# Patient Record
Sex: FEMALE | Race: WHITE | NOT HISPANIC OR LATINO | Employment: FULL TIME | URBAN - METROPOLITAN AREA
[De-identification: names, ages, dates, MRNs, and addresses within clinical notes are randomized per-mention and may not be internally consistent; named-entity substitution may affect disease eponyms.]

---

## 2017-12-26 ENCOUNTER — TELEPHONE (OUTPATIENT)
Dept: DERMATOLOGY | Facility: CLINIC | Age: 37
End: 2017-12-26

## 2017-12-26 NOTE — TELEPHONE ENCOUNTER
----- Message from Moises Basil sent at 12/26/2017  4:14 PM CST -----  Contact: Patient  Patient states that she would like to talk to a nurse and it's about a bump growing on her chin.  It was all explained about when the next available appointment would be and she still would like to talk to the nurse.  Please call her back at [phone].  Thank you

## 2018-05-30 ENCOUNTER — OFFICE VISIT (OUTPATIENT)
Dept: URGENT CARE | Facility: CLINIC | Age: 38
End: 2018-05-30
Payer: COMMERCIAL

## 2018-05-30 VITALS
RESPIRATION RATE: 18 BRPM | DIASTOLIC BLOOD PRESSURE: 68 MMHG | HEART RATE: 74 BPM | TEMPERATURE: 97 F | OXYGEN SATURATION: 99 % | BODY MASS INDEX: 33.34 KG/M2 | WEIGHT: 220 LBS | HEIGHT: 68 IN | SYSTOLIC BLOOD PRESSURE: 113 MMHG

## 2018-05-30 DIAGNOSIS — R51.9 FACIAL DISCOMFORT: Primary | ICD-10-CM

## 2018-05-30 PROCEDURE — 3008F BODY MASS INDEX DOCD: CPT | Mod: CPTII,S$GLB,, | Performed by: FAMILY MEDICINE

## 2018-05-30 PROCEDURE — 3078F DIAST BP <80 MM HG: CPT | Mod: CPTII,S$GLB,, | Performed by: FAMILY MEDICINE

## 2018-05-30 PROCEDURE — 3074F SYST BP LT 130 MM HG: CPT | Mod: CPTII,S$GLB,, | Performed by: FAMILY MEDICINE

## 2018-05-30 PROCEDURE — 99213 OFFICE O/P EST LOW 20 MIN: CPT | Mod: S$GLB,,, | Performed by: FAMILY MEDICINE

## 2018-05-30 NOTE — PATIENT INSTRUCTIONS
Discussed if there are signs of stroke- weakness on one side, drooping of one side, difficulty speaking etc she will need to go to ER for evaluation     What is Bell's palsy? -- Bell's palsy is a condition that causes 1 side of a person's face to become weak or droop. This can happen when 1 of the nerves that control the muscles in the face gets damaged or stops working. Damage to this nerve usually happens when the nerve becomes swollen or inflamed because of an infection with a virus.  Most people with Bell's palsy recover completely. But some people have symptoms of Bell's palsy for the rest of their life.  What are the symptoms of Bell's palsy? -- The symptoms can include:  ?An eyebrow that sags on 1 side  ?Drooping of 1 eye and 1 corner of the mouth  ?1 eye that will not close completely  Some people with Bell's palsy even lose the ability to taste on the front of their tongue. And some become sensitive to loud noises on the affected side.  Is there a test for Bell's palsy? -- No. There is no test. But your doctor or nurse should be able to tell if you have it by learning about your symptoms and doing an exam. Even so, your doctor or nurse might order tests to check whether you have a different medical problem that could be confused with Bell's palsy.  Should I see a doctor or nurse? -- Yes. If you have any symptoms of Bell's palsy, see your doctor or nurse right away. Effective treatments exist, but they work best if you start them soon after your symptoms start.  How is Bell's palsy treated? -- Treatments include:  ?Medicines to reduce inflammation and swelling - Many people with Bell's palsy get medicines called steroids. These are not the same as the steroids athletes take illegally. These steroids help bring down the swelling that is often the cause of Bell's palsy.  ?Protection for your eyes - Bell's palsy sometimes makes it impossible for you to close 1 of your eyes. If that happens, it's important that  "you keep your eye moist and protected. Otherwise your eye can dry out and get damaged. Your doctor might prescribe eyedrops or ointments to protect your eye. You should also wear glasses or goggles during the day and an eye patch at night.  ?Medicines that help fight viruses - The swelling that causes Bell's palsy might be triggered by viruses. For this reason, some people get "antiviral medicines," which help them fight infection.  Will my face go back to normal? -- Probably. Most people with Bell's palsy start to get better within 3 weeks of when their symptoms start. But it can take them up to 6 months to get completely back to normal. Some people do not recover completely. They can have some weakness in their face that never goes away.    "

## 2018-05-30 NOTE — PROGRESS NOTES
"Subjective:       Patient ID: Pura Gudino is a 38 y.o. female.    Vitals:  height is 5' 8" (1.727 m) and weight is 99.8 kg (220 lb). Her oral temperature is 97.4 °F (36.3 °C). Her blood pressure is 113/68 and her pulse is 74. Her respiration is 18 and oxygen saturation is 99%.     Chief Complaint: Facial Swelling    Started today when she woke up noticed right eye felt as if something was in it, felt as if her mouth was lower on right side.   Went to [place] for a wedding, felt bad for 3 days (symtpoms included fatigue/HA). Has a history of migraines. HA "mostly around the eyes". Denies blurry vision.  While in [place] she states she was around someone that had the vaccines  Mother was diagnosed with bells palsy and she is worried she will also have bells palsy     She denies any insect bites or tick bites          Edema   This is a new problem. The current episode started today. The problem occurs constantly. The problem has been gradually worsening. Associated symptoms include headaches. Pertinent negatives include no abdominal pain, chest pain, chills, fever, nausea, rash, sore throat or vomiting. Nothing aggravates the symptoms. She has tried nothing for the symptoms. The treatment provided no relief.     Review of Systems   Constitution: Negative for chills and fever.   HENT: Negative for sore throat.    Eyes: Negative for blurred vision.   Cardiovascular: Negative for chest pain.   Respiratory: Negative for shortness of breath.    Skin: Negative for rash.   Musculoskeletal: Negative for back pain and joint pain.   Gastrointestinal: Negative for abdominal pain, diarrhea, nausea and vomiting.   Neurological: Positive for headaches.   Psychiatric/Behavioral: The patient is not nervous/anxious.        Objective:      Physical Exam   Constitutional: She appears well-developed and well-nourished. No distress.   HENT:   Head: Normocephalic and atraumatic. Head is without right periorbital erythema and without " left periorbital erythema.   Right Ear: External ear normal. A middle ear effusion is present.   Left Ear: External ear normal. A middle ear effusion is present.   Nose: Nose normal. No mucosal edema.   Mouth/Throat: Posterior oropharyngeal erythema present.   Eyes: Conjunctivae, EOM and lids are normal. Right pupil is round and reactive. Left pupil is round and reactive. Pupils are equal.   There is no drooping of the eyes or brows   Neck: Normal range of motion. Neck supple. No thyromegaly present.   Cardiovascular: Normal rate and regular rhythm.    Pulmonary/Chest: Effort normal and breath sounds normal. No respiratory distress.   Musculoskeletal: Normal range of motion.   Neurological: She is alert. She has normal strength. No cranial nerve deficit or sensory deficit. Coordination and gait normal. GCS eye subscore is 4. GCS verbal subscore is 5. GCS motor subscore is 6.   1st exam she denies feeling different sensation right vs left cheek and forehead   2nd exam she states she feels me more on the left than right cheek and forehead   Skin: Skin is warm and dry.   Psychiatric: Her speech is normal and behavior is normal. Thought content normal. Her affect is blunt.   Nursing note and vitals reviewed.      Assessment:       1. Facial discomfort        Plan:         Facial discomfort    I provided reassurance to the patient discussing if there was facial droop or droop of the mouth that it is no longer present. She was able to smile which was even, puff her cheeks, stick her tongue out and move it left to right. Equal  strength. I included on the AVS signs of bell's palsy.   Discussed signs of a stroke, if she had blurry vision, weakness on one side, difficulty speaking, facial droop etc she will need to go to er for eval    Spoke with Dr. Dennis about this case. Patient was unhappy about this and stated she could hear us discussing her case.   Patient was unhappy with being at the clinic for an hour,  states she felt this was too long. I asked her if she wanted to see Dr. Dennis and she declined. Dr. Dennis spoke with the patient briefly, but she took her AVS and walked out of the clinic.

## 2018-05-31 ENCOUNTER — TELEPHONE (OUTPATIENT)
Dept: URGENT CARE | Facility: CLINIC | Age: 38
End: 2018-05-31

## 2018-05-31 NOTE — TELEPHONE ENCOUNTER
Call Back- Patient followed up with primary care after her visit with us yesterday to get a second opinion.

## 2018-06-02 ENCOUNTER — TELEPHONE (OUTPATIENT)
Dept: URGENT CARE | Facility: CLINIC | Age: 38
End: 2018-06-02

## 2018-06-02 NOTE — TELEPHONE ENCOUNTER
Pt states better now cause she went to primary after leaving urgent care and received medication for URI

## 2018-06-13 ENCOUNTER — OFFICE VISIT (OUTPATIENT)
Dept: OPTOMETRY | Facility: CLINIC | Age: 38
End: 2018-06-13
Payer: COMMERCIAL

## 2018-06-13 DIAGNOSIS — H52.203 MYOPIA WITH ASTIGMATISM, BILATERAL: ICD-10-CM

## 2018-06-13 DIAGNOSIS — M06.9 RHEUMATOID ARTHRITIS INVOLVING BOTH HANDS, UNSPECIFIED RHEUMATOID FACTOR PRESENCE: Primary | ICD-10-CM

## 2018-06-13 DIAGNOSIS — G51.8 NEURALGIC FACIAL PAIN: ICD-10-CM

## 2018-06-13 DIAGNOSIS — Z79.899 LONG-TERM USE OF PLAQUENIL: ICD-10-CM

## 2018-06-13 DIAGNOSIS — H52.13 MYOPIA WITH ASTIGMATISM, BILATERAL: ICD-10-CM

## 2018-06-13 PROCEDURE — 92014 COMPRE OPH EXAM EST PT 1/>: CPT | Mod: S$GLB,,, | Performed by: OPTOMETRIST

## 2018-06-13 PROCEDURE — 99999 PR PBB SHADOW E&M-EST. PATIENT-LVL II: CPT | Mod: PBBFAC,,, | Performed by: OPTOMETRIST

## 2018-06-13 PROCEDURE — 92134 CPTRZ OPH DX IMG PST SGM RTA: CPT | Mod: S$GLB,,, | Performed by: OPTOMETRIST

## 2018-06-13 PROCEDURE — 92015 DETERMINE REFRACTIVE STATE: CPT | Mod: S$GLB,,, | Performed by: OPTOMETRIST

## 2018-06-13 NOTE — PROGRESS NOTES
HPI     Some discomfort around OS / inf lid / maxilla area--longstanding  Unsure of sinus allergy issues  Taking plaquenil 5-6 years  Previous OCT 2016        Last edited by ROBIN Tran, OD on 6/13/2018 12:26 PM. (History)        ROS     Positive for: Eyes    Negative for: Constitutional, Gastrointestinal, Neurological, Skin,   Genitourinary, Musculoskeletal, HENT, Endocrine, Cardiovascular,   Respiratory, Psychiatric, Allergic/Imm, Heme/Lymph    Last edited by ROBIN Tran, OD on 6/13/2018  8:16 AM. (History)        Assessment /Plan     For exam results, see Encounter Report.    Rheumatoid arthritis involving both hands, unspecified rheumatoid factor presence  -     Posterior Segment OCT Retina-Both eyes  -     Lopez Visual Field - OU - Extended - Both Eyes; Future    Long-term use of Plaquenil  -     Posterior Segment OCT Retina-Both eyes  -     Lopez Visual Field - OU - Extended - Both Eyes; Future    Neuralgic facial pain    Myopia with astigmatism, bilateral      1,2. Normal macula / retina on DFE today  Updated macular OCT---normal contour, no maculopathy  Order updated 10-2 for new baseline    3. Longstanding, not of ocular origin, follow w/ pcp or neuro  4. Updated specs rx, gave copy, fill prn    Discussed and educated patient on current findings /plan.  RTC 1 year, prn if any changes / issues

## 2019-03-12 ENCOUNTER — TELEPHONE (OUTPATIENT)
Dept: DERMATOLOGY | Facility: CLINIC | Age: 39
End: 2019-03-12

## 2019-03-12 NOTE — TELEPHONE ENCOUNTER
----- Message from Josselyn Durbin LPN sent at 3/8/2019 12:55 PM CST -----  Contact: pt       ----- Message -----  From: Sandra Trent  Sent: 3/8/2019  12:51 PM  To: Elis EDMONDS Staff    JGD - pt Patient Requesting Sooner Appointment.     Reason for sooner appt.: pt is an existing pt of Dr Gillis pt was last seen in 2014 pt is coming in for bumps on her face pt stated Dr Gillis helped her out the last time she was seen for the same issue   When is the first available appointment? No time is coming up at all to schedule   Communication Preference: can you please call pt at [phone]  Additional Information: pt would rather see Dr Gillis I offered another doctor for the pt to see     [facility]

## 2019-05-22 ENCOUNTER — TELEPHONE (OUTPATIENT)
Dept: RHEUMATOLOGY | Facility: CLINIC | Age: 39
End: 2019-05-22

## 2019-05-22 NOTE — TELEPHONE ENCOUNTER
----- Message from Norah Fernández sent at 5/22/2019  1:48 PM CDT -----  Type: Needs Medical Advice    Who Called:  Patient  Best Call Back Number: [phone]  Additional Information: Would like to make a new patient appointment due to her psoriatic arthritis. She had been going to a doctor on the [facility] but would like to start going to your office. Please call to advise.

## 2019-06-04 ENCOUNTER — OFFICE VISIT (OUTPATIENT)
Dept: FAMILY MEDICINE | Facility: CLINIC | Age: 39
End: 2019-06-04
Payer: COMMERCIAL

## 2019-06-04 VITALS
OXYGEN SATURATION: 98 % | HEIGHT: 68 IN | SYSTOLIC BLOOD PRESSURE: 104 MMHG | BODY MASS INDEX: 30.47 KG/M2 | DIASTOLIC BLOOD PRESSURE: 66 MMHG | WEIGHT: 201.06 LBS | HEART RATE: 76 BPM

## 2019-06-04 DIAGNOSIS — M13.0 POLYARTHRITIS: Primary | ICD-10-CM

## 2019-06-04 DIAGNOSIS — H66.90 OTITIS MEDIA, UNSPECIFIED LATERALITY, UNSPECIFIED OTITIS MEDIA TYPE: ICD-10-CM

## 2019-06-04 DIAGNOSIS — F31.9 BIPOLAR AFFECTIVE DISORDER, REMISSION STATUS UNSPECIFIED: ICD-10-CM

## 2019-06-04 PROCEDURE — 99999 PR PBB SHADOW E&M-EST. PATIENT-LVL III: ICD-10-PCS | Mod: PBBFAC,,, | Performed by: INTERNAL MEDICINE

## 2019-06-04 PROCEDURE — 99999 PR PBB SHADOW E&M-EST. PATIENT-LVL III: CPT | Mod: PBBFAC,,, | Performed by: INTERNAL MEDICINE

## 2019-06-04 PROCEDURE — 99203 PR OFFICE/OUTPT VISIT, NEW, LEVL III, 30-44 MIN: ICD-10-PCS | Mod: S$GLB,,, | Performed by: INTERNAL MEDICINE

## 2019-06-04 PROCEDURE — 3078F DIAST BP <80 MM HG: CPT | Mod: CPTII,S$GLB,, | Performed by: INTERNAL MEDICINE

## 2019-06-04 PROCEDURE — 3008F BODY MASS INDEX DOCD: CPT | Mod: CPTII,S$GLB,, | Performed by: INTERNAL MEDICINE

## 2019-06-04 PROCEDURE — 99203 OFFICE O/P NEW LOW 30 MIN: CPT | Mod: S$GLB,,, | Performed by: INTERNAL MEDICINE

## 2019-06-04 PROCEDURE — 3008F PR BODY MASS INDEX (BMI) DOCUMENTED: ICD-10-PCS | Mod: CPTII,S$GLB,, | Performed by: INTERNAL MEDICINE

## 2019-06-04 PROCEDURE — 3074F PR MOST RECENT SYSTOLIC BLOOD PRESSURE < 130 MM HG: ICD-10-PCS | Mod: CPTII,S$GLB,, | Performed by: INTERNAL MEDICINE

## 2019-06-04 PROCEDURE — 3078F PR MOST RECENT DIASTOLIC BLOOD PRESSURE < 80 MM HG: ICD-10-PCS | Mod: CPTII,S$GLB,, | Performed by: INTERNAL MEDICINE

## 2019-06-04 PROCEDURE — 3074F SYST BP LT 130 MM HG: CPT | Mod: CPTII,S$GLB,, | Performed by: INTERNAL MEDICINE

## 2019-06-04 RX ORDER — LORAZEPAM 0.5 MG/1
0.5 TABLET ORAL
COMMUNITY
Start: 2019-03-18 | End: 2019-08-13

## 2019-06-04 RX ORDER — NAPROXEN 500 MG/1
TABLET ORAL DAILY PRN
COMMUNITY
End: 2019-08-13

## 2019-06-04 RX ORDER — MINERAL OIL
ENEMA (ML) RECTAL
COMMUNITY
Start: 2018-06-04 | End: 2022-02-28 | Stop reason: ALTCHOICE

## 2019-06-04 RX ORDER — TOPIRAMATE 200 MG/1
TABLET ORAL
COMMUNITY
End: 2019-07-01 | Stop reason: SDUPTHER

## 2019-06-04 RX ORDER — DOXYCYCLINE 100 MG/1
100 CAPSULE ORAL 2 TIMES DAILY
Qty: 20 CAPSULE | Refills: 0 | Status: SHIPPED | OUTPATIENT
Start: 2019-06-04 | End: 2019-08-13

## 2019-06-04 NOTE — PROGRESS NOTES
Subjective:       Patient ID: Pura Gudino is a 39 y.o. female.    Chief Complaint: Otalgia (Left) and Headache (Left)    Pt here to get established. She complains of left temporal headache that radiates to her era. She says it feels hot. She has a history of migraines and takes topamax for prevention.  She says that her temporal area is tender to touch.  She says this isnot a normal migraine pattern for her. She says her left ear feels stopped up. She last had a sinus infection four weeks ago and took antibiotics at that time. She takes plaquenil for RA and says that her ESR was good two months ago.  She follows with dr marmolejo.     Otalgia    There is pain in the left ear. This is a recurrent problem. The current episode started more than 1 month ago. The problem occurs every few hours. The problem has been waxing and waning. There has been no fever. The pain is at a severity of 6/10. The pain is moderate. Associated symptoms include headaches. Pertinent negatives include no abdominal pain, coughing, diarrhea, rash, sore throat or vomiting. She has tried NSAIDs and antibiotics for the symptoms. The treatment provided mild relief. There is no history of a chronic ear infection, hearing loss or a tympanostomy tube.     Review of Systems   Constitutional: Negative for fatigue, fever and unexpected weight change.   HENT: Positive for ear pain. Negative for congestion, postnasal drip, sinus pain and sore throat.    Eyes: Negative for visual disturbance.   Respiratory: Negative for cough, chest tightness, shortness of breath and wheezing.    Cardiovascular: Negative for chest pain, palpitations and leg swelling.   Gastrointestinal: Negative for abdominal pain, blood in stool, constipation, diarrhea, nausea and vomiting.   Endocrine: Negative for cold intolerance and heat intolerance.   Genitourinary: Negative for difficulty urinating, dysuria and frequency.   Musculoskeletal: Negative for back pain, joint  swelling and myalgias.   Skin: Negative for rash.   Allergic/Immunologic: Negative for environmental allergies.   Neurological: Positive for headaches. Negative for dizziness, seizures and numbness.   Hematological: Does not bruise/bleed easily.   Psychiatric/Behavioral: Negative for agitation and sleep disturbance.       Objective:      Physical Exam   Constitutional: She is oriented to person, place, and time. She appears well-developed and well-nourished.   HENT:   Head: Normocephalic and atraumatic.   Mouth/Throat: Oropharynx is clear and moist.   Left TM erythema, retracted   Eyes: Pupils are equal, round, and reactive to light. Conjunctivae and EOM are normal.   Neck: Normal range of motion. Neck supple. No thyromegaly present.   Cardiovascular: Normal rate, regular rhythm, normal heart sounds and intact distal pulses. Exam reveals no gallop and no friction rub.   No murmur heard.  Pulmonary/Chest: Effort normal and breath sounds normal. No respiratory distress. She has no wheezes. She has no rales.   Abdominal: Soft. Bowel sounds are normal. She exhibits no distension. There is no tenderness.   Musculoskeletal: Normal range of motion. She exhibits no edema.   Lymphadenopathy:     She has no cervical adenopathy.   Neurological: She is alert and oriented to person, place, and time. No cranial nerve deficit.   Skin: Skin is warm and dry.   Psychiatric: She has a normal mood and affect. Her behavior is normal.       Assessment:       1. Polyarthritis    2. Bipolar affective disorder, remission status unspecified    3. Otitis media, unspecified laterality, unspecified otitis media type        Plan:       Otitis media- finished amoxil 4 weeks ago.  Tx with doxy. Encouraged flonase for allergies. Call if no improvement

## 2019-06-18 ENCOUNTER — PATIENT MESSAGE (OUTPATIENT)
Dept: FAMILY MEDICINE | Facility: CLINIC | Age: 39
End: 2019-06-18

## 2019-06-19 ENCOUNTER — OFFICE VISIT (OUTPATIENT)
Dept: FAMILY MEDICINE | Facility: CLINIC | Age: 39
End: 2019-06-19
Payer: COMMERCIAL

## 2019-06-19 VITALS
HEART RATE: 77 BPM | SYSTOLIC BLOOD PRESSURE: 108 MMHG | BODY MASS INDEX: 30.71 KG/M2 | RESPIRATION RATE: 17 BRPM | HEIGHT: 68 IN | WEIGHT: 202.63 LBS | OXYGEN SATURATION: 97 % | DIASTOLIC BLOOD PRESSURE: 80 MMHG

## 2019-06-19 DIAGNOSIS — R35.0 FREQUENCY OF URINATION: Primary | ICD-10-CM

## 2019-06-19 DIAGNOSIS — H92.09 OTALGIA, UNSPECIFIED LATERALITY: ICD-10-CM

## 2019-06-19 PROBLEM — R51 HEADACHE(784.0): Status: ACTIVE | Noted: 2019-06-19

## 2019-06-19 LAB
BILIRUB UR QL STRIP: NEGATIVE
CLARITY UR: CLEAR
COLOR UR: YELLOW
GLUCOSE UR QL STRIP: NEGATIVE
HGB UR QL STRIP: NEGATIVE
KETONES UR QL STRIP: NEGATIVE
LEUKOCYTE ESTERASE UR QL STRIP: NEGATIVE
NITRITE UR QL STRIP: NEGATIVE
PH UR STRIP: 5 [PH] (ref 5–8)
PROT UR QL STRIP: NEGATIVE
SP GR UR STRIP: >=1.03 (ref 1–1.03)
URN SPEC COLLECT METH UR: ABNORMAL

## 2019-06-19 PROCEDURE — 99213 OFFICE O/P EST LOW 20 MIN: CPT | Mod: S$GLB,,, | Performed by: INTERNAL MEDICINE

## 2019-06-19 PROCEDURE — 3074F PR MOST RECENT SYSTOLIC BLOOD PRESSURE < 130 MM HG: ICD-10-PCS | Mod: CPTII,S$GLB,, | Performed by: INTERNAL MEDICINE

## 2019-06-19 PROCEDURE — 3008F BODY MASS INDEX DOCD: CPT | Mod: CPTII,S$GLB,, | Performed by: INTERNAL MEDICINE

## 2019-06-19 PROCEDURE — 3074F SYST BP LT 130 MM HG: CPT | Mod: CPTII,S$GLB,, | Performed by: INTERNAL MEDICINE

## 2019-06-19 PROCEDURE — 3079F DIAST BP 80-89 MM HG: CPT | Mod: CPTII,S$GLB,, | Performed by: INTERNAL MEDICINE

## 2019-06-19 PROCEDURE — 99999 PR PBB SHADOW E&M-EST. PATIENT-LVL IV: CPT | Mod: PBBFAC,,, | Performed by: INTERNAL MEDICINE

## 2019-06-19 PROCEDURE — 99999 PR PBB SHADOW E&M-EST. PATIENT-LVL IV: ICD-10-PCS | Mod: PBBFAC,,, | Performed by: INTERNAL MEDICINE

## 2019-06-19 PROCEDURE — 3079F PR MOST RECENT DIASTOLIC BLOOD PRESSURE 80-89 MM HG: ICD-10-PCS | Mod: CPTII,S$GLB,, | Performed by: INTERNAL MEDICINE

## 2019-06-19 PROCEDURE — 99213 PR OFFICE/OUTPT VISIT, EST, LEVL III, 20-29 MIN: ICD-10-PCS | Mod: S$GLB,,, | Performed by: INTERNAL MEDICINE

## 2019-06-19 PROCEDURE — 81003 URINALYSIS AUTO W/O SCOPE: CPT | Mod: PO

## 2019-06-19 PROCEDURE — 3008F PR BODY MASS INDEX (BMI) DOCUMENTED: ICD-10-PCS | Mod: CPTII,S$GLB,, | Performed by: INTERNAL MEDICINE

## 2019-06-19 RX ORDER — METHYLPREDNISOLONE 4 MG/1
TABLET ORAL
Qty: 1 PACKAGE | Refills: 0 | Status: SHIPPED | OUTPATIENT
Start: 2019-06-19 | End: 2019-07-10

## 2019-06-19 NOTE — PROGRESS NOTES
Subjective:       Patient ID: Pura Gudino is a 39 y.o. female.    Chief Complaint: Otalgia (follow up)    Pt here for follow up ear pain. She says she it improved a little with the antibiotic with once she stopped the antibiotic is returned.  She says she has bilateral ear pain and still has left temporal pain. She also complains of urinary frequency. No burning on urination.  She has appt with gyn regarding testing for bladder spasms.     Otalgia    There is pain in both ears. This is a recurrent problem. The current episode started more than 1 month ago. The problem occurs hourly. The problem has been waxing and waning. There has been no fever. The pain is at a severity of 5/10. The pain is moderate. Associated symptoms include drainage, headaches, neck pain, rhinorrhea and a sore throat. Pertinent negatives include no abdominal pain, coughing, diarrhea, ear discharge, hearing loss, rash or vomiting. She has tried NSAIDs, antibiotics and heat packs for the symptoms. The treatment provided moderate relief. There is no history of a chronic ear infection, hearing loss or a tympanostomy tube.     Review of Systems   HENT: Positive for ear pain, rhinorrhea and sore throat. Negative for ear discharge and hearing loss.    Respiratory: Negative for cough.    Gastrointestinal: Negative for abdominal pain, diarrhea and vomiting.   Musculoskeletal: Positive for neck pain.   Skin: Negative for rash.   Neurological: Positive for headaches.       Objective:      Physical Exam   Constitutional: She is oriented to person, place, and time. She appears well-developed and well-nourished.   HENT:   Head: Normocephalic and atraumatic.   Mouth/Throat: Oropharynx is clear and moist.   Eyes: Pupils are equal, round, and reactive to light. Conjunctivae and EOM are normal.   Neck: Normal range of motion. Neck supple. No thyromegaly present.   Cardiovascular: Normal rate, regular rhythm, normal heart sounds and intact distal  pulses. Exam reveals no gallop and no friction rub.   No murmur heard.  Pulmonary/Chest: Effort normal and breath sounds normal. No respiratory distress. She has no wheezes. She has no rales.   Abdominal: Soft. Bowel sounds are normal. She exhibits no distension. There is no tenderness.   Musculoskeletal: Normal range of motion. She exhibits no edema.   Lymphadenopathy:     She has no cervical adenopathy.   Neurological: She is alert and oriented to person, place, and time. No cranial nerve deficit.   Skin: Skin is warm and dry.   Psychiatric: She has a normal mood and affect. Her behavior is normal.       Assessment:       1. Frequency of urination    2. Otalgia, unspecified laterality        Plan:       Otalgia- ears clear today. Will tx with medrol pack as want ot avoid continue antibitoics. If no improvement will refer to ent.  Alsoconsidering ESR to check for temporal arteritis (will see first if steroid helps)  ferquency of uriation- ua ordered.

## 2019-06-23 ENCOUNTER — PATIENT MESSAGE (OUTPATIENT)
Dept: FAMILY MEDICINE | Facility: CLINIC | Age: 39
End: 2019-06-23

## 2019-06-29 ENCOUNTER — PATIENT MESSAGE (OUTPATIENT)
Dept: FAMILY MEDICINE | Facility: CLINIC | Age: 39
End: 2019-06-29

## 2019-07-02 RX ORDER — TOPIRAMATE 100 MG/1
TABLET, FILM COATED ORAL
Qty: 60 TABLET | Refills: 5 | Status: SHIPPED | OUTPATIENT
Start: 2019-07-02 | End: 2020-01-30

## 2019-08-13 ENCOUNTER — OFFICE VISIT (OUTPATIENT)
Dept: FAMILY MEDICINE | Facility: CLINIC | Age: 39
End: 2019-08-13
Payer: COMMERCIAL

## 2019-08-13 VITALS
BODY MASS INDEX: 31.28 KG/M2 | WEIGHT: 206.38 LBS | HEIGHT: 68 IN | SYSTOLIC BLOOD PRESSURE: 102 MMHG | DIASTOLIC BLOOD PRESSURE: 72 MMHG | HEART RATE: 75 BPM

## 2019-08-13 DIAGNOSIS — J32.9 SINUSITIS, UNSPECIFIED CHRONICITY, UNSPECIFIED LOCATION: Primary | ICD-10-CM

## 2019-08-13 PROCEDURE — 99999 PR PBB SHADOW E&M-EST. PATIENT-LVL III: CPT | Mod: PBBFAC,,, | Performed by: INTERNAL MEDICINE

## 2019-08-13 PROCEDURE — 99213 PR OFFICE/OUTPT VISIT, EST, LEVL III, 20-29 MIN: ICD-10-PCS | Mod: 25,S$GLB,, | Performed by: INTERNAL MEDICINE

## 2019-08-13 PROCEDURE — 99999 PR PBB SHADOW E&M-EST. PATIENT-LVL III: ICD-10-PCS | Mod: PBBFAC,,, | Performed by: INTERNAL MEDICINE

## 2019-08-13 PROCEDURE — 3074F SYST BP LT 130 MM HG: CPT | Mod: CPTII,S$GLB,, | Performed by: INTERNAL MEDICINE

## 2019-08-13 PROCEDURE — 96372 PR INJECTION,THERAP/PROPH/DIAG2ST, IM OR SUBCUT: ICD-10-PCS | Mod: S$GLB,,, | Performed by: INTERNAL MEDICINE

## 2019-08-13 PROCEDURE — 3078F DIAST BP <80 MM HG: CPT | Mod: CPTII,S$GLB,, | Performed by: INTERNAL MEDICINE

## 2019-08-13 PROCEDURE — 3008F PR BODY MASS INDEX (BMI) DOCUMENTED: ICD-10-PCS | Mod: CPTII,S$GLB,, | Performed by: INTERNAL MEDICINE

## 2019-08-13 PROCEDURE — 3008F BODY MASS INDEX DOCD: CPT | Mod: CPTII,S$GLB,, | Performed by: INTERNAL MEDICINE

## 2019-08-13 PROCEDURE — 96372 THER/PROPH/DIAG INJ SC/IM: CPT | Mod: S$GLB,,, | Performed by: INTERNAL MEDICINE

## 2019-08-13 PROCEDURE — 99213 OFFICE O/P EST LOW 20 MIN: CPT | Mod: 25,S$GLB,, | Performed by: INTERNAL MEDICINE

## 2019-08-13 PROCEDURE — 3078F PR MOST RECENT DIASTOLIC BLOOD PRESSURE < 80 MM HG: ICD-10-PCS | Mod: CPTII,S$GLB,, | Performed by: INTERNAL MEDICINE

## 2019-08-13 PROCEDURE — 3074F PR MOST RECENT SYSTOLIC BLOOD PRESSURE < 130 MM HG: ICD-10-PCS | Mod: CPTII,S$GLB,, | Performed by: INTERNAL MEDICINE

## 2019-08-13 RX ORDER — LORAZEPAM 0.5 MG/1
0.5 TABLET ORAL
COMMUNITY
Start: 2019-07-17 | End: 2019-08-13

## 2019-08-13 RX ORDER — VILAZODONE HYDROCHLORIDE 20 MG/1
20 TABLET ORAL
COMMUNITY
Start: 2019-08-07 | End: 2020-02-26 | Stop reason: SDUPTHER

## 2019-08-13 RX ORDER — AZITHROMYCIN 250 MG/1
TABLET, FILM COATED ORAL
Qty: 6 TABLET | Refills: 0 | Status: SHIPPED | OUTPATIENT
Start: 2019-08-13 | End: 2019-08-18

## 2019-08-13 RX ORDER — PROPRANOLOL HYDROCHLORIDE 80 MG/1
CAPSULE, EXTENDED RELEASE ORAL
Refills: 4 | COMMUNITY
Start: 2019-07-23 | End: 2020-02-26 | Stop reason: SDUPTHER

## 2019-08-13 RX ORDER — TRIAMCINOLONE ACETONIDE 40 MG/ML
40 INJECTION, SUSPENSION INTRA-ARTICULAR; INTRAMUSCULAR
Status: COMPLETED | OUTPATIENT
Start: 2019-08-13 | End: 2019-08-13

## 2019-08-13 RX ORDER — VILAZODONE HYDROCHLORIDE 20 MG/1
TABLET ORAL
Refills: 1 | COMMUNITY
Start: 2019-08-07 | End: 2021-01-28

## 2019-08-13 RX ADMIN — TRIAMCINOLONE ACETONIDE 40 MG: 40 INJECTION, SUSPENSION INTRA-ARTICULAR; INTRAMUSCULAR at 11:08

## 2019-08-13 NOTE — PROGRESS NOTES
"Subjective:       Patient ID: Pura Gudino is a 39 y.o. female.    Chief Complaint: headaches (headaches for a week. )    Pt here for sinus pressure, headache. She a fever last week. She has PND, runny nose, sneezing.  She denies any colored sputum. She is coughing a dry cough. She just overall feels "under the weather."      Review of Systems   Constitutional: Negative for fatigue, fever and unexpected weight change.   HENT: Negative for congestion, postnasal drip, sinus pain and sore throat.    Eyes: Negative for visual disturbance.   Respiratory: Negative for cough, chest tightness, shortness of breath and wheezing.    Cardiovascular: Negative for chest pain, palpitations and leg swelling.   Gastrointestinal: Negative for abdominal pain, blood in stool, constipation, diarrhea, nausea and vomiting.   Endocrine: Negative for cold intolerance and heat intolerance.   Genitourinary: Negative for difficulty urinating, dysuria and frequency.   Musculoskeletal: Negative for back pain, joint swelling and myalgias.   Skin: Negative for rash.   Allergic/Immunologic: Negative for environmental allergies.   Neurological: Negative for dizziness, seizures, numbness and headaches.   Hematological: Does not bruise/bleed easily.   Psychiatric/Behavioral: Negative for agitation and sleep disturbance.       Objective:      Physical Exam   Constitutional: She is oriented to person, place, and time. She appears well-developed and well-nourished.   HENT:   Head: Normocephalic and atraumatic.   Mouth/Throat: Oropharynx is clear and moist.   Eyes: Pupils are equal, round, and reactive to light. Conjunctivae and EOM are normal.   Neck: Normal range of motion. Neck supple. No thyromegaly present.   Cardiovascular: Normal rate, regular rhythm, normal heart sounds and intact distal pulses. Exam reveals no gallop and no friction rub.   No murmur heard.  Pulmonary/Chest: Effort normal and breath sounds normal. No respiratory distress. " She has no wheezes. She has no rales.   Abdominal: Soft. Bowel sounds are normal. She exhibits no distension. There is no tenderness.   Musculoskeletal: Normal range of motion. She exhibits no edema.   Lymphadenopathy:     She has no cervical adenopathy.   Neurological: She is alert and oriented to person, place, and time. No cranial nerve deficit.   Skin: Skin is warm and dry.   Psychiatric: She has a normal mood and affect. Her behavior is normal.       Assessment:       1. Sinusitis, unspecified chronicity, unspecified location        Plan:       sinusitits-  Kenalog, zpack. Call if no improvmeent

## 2019-08-18 ENCOUNTER — PATIENT MESSAGE (OUTPATIENT)
Dept: FAMILY MEDICINE | Facility: CLINIC | Age: 39
End: 2019-08-18

## 2019-08-19 RX ORDER — FLUCONAZOLE 150 MG/1
150 TABLET ORAL DAILY
Qty: 1 TABLET | Refills: 0 | Status: SHIPPED | OUTPATIENT
Start: 2019-08-19 | End: 2019-08-20

## 2019-08-27 ENCOUNTER — OFFICE VISIT (OUTPATIENT)
Dept: FAMILY MEDICINE | Facility: CLINIC | Age: 39
End: 2019-08-27
Payer: COMMERCIAL

## 2019-08-27 VITALS
HEART RATE: 65 BPM | WEIGHT: 207.81 LBS | SYSTOLIC BLOOD PRESSURE: 128 MMHG | OXYGEN SATURATION: 99 % | TEMPERATURE: 98 F | BODY MASS INDEX: 31.49 KG/M2 | HEIGHT: 68 IN | DIASTOLIC BLOOD PRESSURE: 86 MMHG

## 2019-08-27 DIAGNOSIS — M25.519 SHOULDER PAIN, UNSPECIFIED CHRONICITY, UNSPECIFIED LATERALITY: Primary | ICD-10-CM

## 2019-08-27 PROCEDURE — 99999 PR PBB SHADOW E&M-EST. PATIENT-LVL IV: CPT | Mod: PBBFAC,,, | Performed by: INTERNAL MEDICINE

## 2019-08-27 PROCEDURE — 3008F PR BODY MASS INDEX (BMI) DOCUMENTED: ICD-10-PCS | Mod: CPTII,S$GLB,, | Performed by: INTERNAL MEDICINE

## 2019-08-27 PROCEDURE — 3079F PR MOST RECENT DIASTOLIC BLOOD PRESSURE 80-89 MM HG: ICD-10-PCS | Mod: CPTII,S$GLB,, | Performed by: INTERNAL MEDICINE

## 2019-08-27 PROCEDURE — 99213 OFFICE O/P EST LOW 20 MIN: CPT | Mod: S$GLB,,, | Performed by: INTERNAL MEDICINE

## 2019-08-27 PROCEDURE — 3074F SYST BP LT 130 MM HG: CPT | Mod: CPTII,S$GLB,, | Performed by: INTERNAL MEDICINE

## 2019-08-27 PROCEDURE — 3079F DIAST BP 80-89 MM HG: CPT | Mod: CPTII,S$GLB,, | Performed by: INTERNAL MEDICINE

## 2019-08-27 PROCEDURE — 3008F BODY MASS INDEX DOCD: CPT | Mod: CPTII,S$GLB,, | Performed by: INTERNAL MEDICINE

## 2019-08-27 PROCEDURE — 99213 PR OFFICE/OUTPT VISIT, EST, LEVL III, 20-29 MIN: ICD-10-PCS | Mod: S$GLB,,, | Performed by: INTERNAL MEDICINE

## 2019-08-27 PROCEDURE — 99999 PR PBB SHADOW E&M-EST. PATIENT-LVL IV: ICD-10-PCS | Mod: PBBFAC,,, | Performed by: INTERNAL MEDICINE

## 2019-08-27 PROCEDURE — 3074F PR MOST RECENT SYSTOLIC BLOOD PRESSURE < 130 MM HG: ICD-10-PCS | Mod: CPTII,S$GLB,, | Performed by: INTERNAL MEDICINE

## 2019-08-27 RX ORDER — METHYLPREDNISOLONE 4 MG/1
TABLET ORAL
Qty: 1 PACKAGE | Refills: 0 | Status: SHIPPED | OUTPATIENT
Start: 2019-08-27 | End: 2019-09-17

## 2019-08-27 NOTE — PROGRESS NOTES
Subjective:       Patient ID: Pura Gudino is a 39 y.o. female.    Chief Complaint: Headache and Shoulder Pain (left side)    Pt states she had her eyebrows waxed last week and now has strange sensation/pain around her eye and under eye brow. She says it feels like the temporal pain she has had in past that we treated with medrol pack.   She also has bilateral shoulder pain. This is a chronic problem for her over the last 8 years but says it is getting worse. She does not have an orthopedist.     Review of Systems   Constitutional: Negative for activity change and unexpected weight change.   HENT: Negative for hearing loss, rhinorrhea and trouble swallowing.    Eyes: Negative for discharge and visual disturbance.   Respiratory: Negative for chest tightness and wheezing.    Cardiovascular: Negative for chest pain and palpitations.   Gastrointestinal: Negative for blood in stool, constipation, diarrhea and vomiting.   Endocrine: Negative for polydipsia and polyuria.   Genitourinary: Negative for difficulty urinating, dysuria, hematuria and menstrual problem.   Musculoskeletal: Positive for arthralgias. Negative for joint swelling and neck pain.   Neurological: Positive for headaches. Negative for weakness.   Psychiatric/Behavioral: Positive for dysphoric mood. Negative for confusion.       Objective:      Physical Exam   Constitutional: She is oriented to person, place, and time. She appears well-developed and well-nourished.   HENT:   Head: Normocephalic and atraumatic.   Mouth/Throat: Oropharynx is clear and moist.   Eyes: Pupils are equal, round, and reactive to light. Conjunctivae and EOM are normal.   Neck: Normal range of motion. Neck supple. No thyromegaly present.   Cardiovascular: Normal rate, regular rhythm, normal heart sounds and intact distal pulses. Exam reveals no gallop and no friction rub.   No murmur heard.  Pulmonary/Chest: Effort normal and breath sounds normal. No respiratory distress.  She has no wheezes. She has no rales.   Abdominal: Soft. Bowel sounds are normal. She exhibits no distension. There is no tenderness.   Musculoskeletal: Normal range of motion. She exhibits no edema.   Lymphadenopathy:     She has no cervical adenopathy.   Neurological: She is alert and oriented to person, place, and time. No cranial nerve deficit.   Skin: Skin is warm and dry.   Psychiatric: She has a normal mood and affect. Her behavior is normal.       Assessment:       1. Shoulder pain, unspecified chronicity, unspecified laterality        Plan:       Shoulder pain- referral t [facility]  Sent in medrol pack for sensatino around eye. No rash. Will let me know if rash develops.

## 2019-08-28 ENCOUNTER — PATIENT MESSAGE (OUTPATIENT)
Dept: FAMILY MEDICINE | Facility: CLINIC | Age: 39
End: 2019-08-28

## 2019-08-28 DIAGNOSIS — G44.89 CHRONIC MIXED HEADACHE SYNDROME: ICD-10-CM

## 2019-08-29 DIAGNOSIS — M25.511 CHRONIC RIGHT SHOULDER PAIN: Primary | ICD-10-CM

## 2019-08-29 DIAGNOSIS — G89.29 CHRONIC RIGHT SHOULDER PAIN: Primary | ICD-10-CM

## 2019-09-09 ENCOUNTER — PATIENT MESSAGE (OUTPATIENT)
Dept: FAMILY MEDICINE | Facility: CLINIC | Age: 39
End: 2019-09-09

## 2019-09-14 ENCOUNTER — HOSPITAL ENCOUNTER (OUTPATIENT)
Dept: RADIOLOGY | Facility: HOSPITAL | Age: 39
Discharge: HOME | End: 2019-09-14
Attending: INTERNAL MEDICINE
Payer: COMMERCIAL

## 2019-09-14 DIAGNOSIS — G44.89 CHRONIC MIXED HEADACHE SYNDROME: ICD-10-CM

## 2019-09-14 PROCEDURE — 70551 MRI BRAIN STEM W/O DYE: CPT | Mod: 26,,, | Performed by: RADIOLOGY

## 2019-09-14 PROCEDURE — 70551 MRI BRAIN STEM W/O DYE: CPT | Mod: TC,PO

## 2019-09-14 PROCEDURE — 70551 MRI BRAIN WITHOUT CONTRAST: ICD-10-PCS | Mod: 26,,, | Performed by: RADIOLOGY

## 2019-10-02 PROBLEM — R51.9 HEADACHE: Status: ACTIVE | Noted: 2019-06-19

## 2019-10-14 ENCOUNTER — IMMUNIZATION (OUTPATIENT)
Dept: PHARMACY | Facility: CLINIC | Age: 39
End: 2019-10-14
Payer: COMMERCIAL

## 2019-10-24 ENCOUNTER — PATIENT MESSAGE (OUTPATIENT)
Dept: FAMILY MEDICINE | Facility: CLINIC | Age: 39
End: 2019-10-24

## 2019-10-24 RX ORDER — PANTOPRAZOLE SODIUM 40 MG/1
TABLET, DELAYED RELEASE ORAL
Qty: 30 TABLET | Refills: 5 | Status: SHIPPED | OUTPATIENT
Start: 2019-10-24 | End: 2020-04-21

## 2019-12-04 ENCOUNTER — OFFICE VISIT (OUTPATIENT)
Dept: FAMILY MEDICINE | Facility: CLINIC | Age: 39
End: 2019-12-04
Payer: COMMERCIAL

## 2019-12-04 VITALS
WEIGHT: 208.13 LBS | RESPIRATION RATE: 18 BRPM | HEART RATE: 83 BPM | HEIGHT: 68 IN | DIASTOLIC BLOOD PRESSURE: 70 MMHG | BODY MASS INDEX: 31.54 KG/M2 | SYSTOLIC BLOOD PRESSURE: 100 MMHG | OXYGEN SATURATION: 97 %

## 2019-12-04 DIAGNOSIS — B96.89 BACTERIAL SINUSITIS: Primary | ICD-10-CM

## 2019-12-04 DIAGNOSIS — J32.9 BACTERIAL SINUSITIS: Primary | ICD-10-CM

## 2019-12-04 PROCEDURE — 99213 OFFICE O/P EST LOW 20 MIN: CPT | Mod: S$GLB,,, | Performed by: INTERNAL MEDICINE

## 2019-12-04 PROCEDURE — 3008F PR BODY MASS INDEX (BMI) DOCUMENTED: ICD-10-PCS | Mod: CPTII,S$GLB,, | Performed by: INTERNAL MEDICINE

## 2019-12-04 PROCEDURE — 99999 PR PBB SHADOW E&M-EST. PATIENT-LVL III: CPT | Mod: PBBFAC,,, | Performed by: INTERNAL MEDICINE

## 2019-12-04 PROCEDURE — 3078F PR MOST RECENT DIASTOLIC BLOOD PRESSURE < 80 MM HG: ICD-10-PCS | Mod: CPTII,S$GLB,, | Performed by: INTERNAL MEDICINE

## 2019-12-04 PROCEDURE — 3008F BODY MASS INDEX DOCD: CPT | Mod: CPTII,S$GLB,, | Performed by: INTERNAL MEDICINE

## 2019-12-04 PROCEDURE — 99999 PR PBB SHADOW E&M-EST. PATIENT-LVL III: ICD-10-PCS | Mod: PBBFAC,,, | Performed by: INTERNAL MEDICINE

## 2019-12-04 PROCEDURE — 3074F SYST BP LT 130 MM HG: CPT | Mod: CPTII,S$GLB,, | Performed by: INTERNAL MEDICINE

## 2019-12-04 PROCEDURE — 3074F PR MOST RECENT SYSTOLIC BLOOD PRESSURE < 130 MM HG: ICD-10-PCS | Mod: CPTII,S$GLB,, | Performed by: INTERNAL MEDICINE

## 2019-12-04 PROCEDURE — 3078F DIAST BP <80 MM HG: CPT | Mod: CPTII,S$GLB,, | Performed by: INTERNAL MEDICINE

## 2019-12-04 PROCEDURE — 99213 PR OFFICE/OUTPT VISIT, EST, LEVL III, 20-29 MIN: ICD-10-PCS | Mod: S$GLB,,, | Performed by: INTERNAL MEDICINE

## 2019-12-04 RX ORDER — AZITHROMYCIN 250 MG/1
TABLET, FILM COATED ORAL
Qty: 6 TABLET | Refills: 0 | Status: SHIPPED | OUTPATIENT
Start: 2019-12-04 | End: 2019-12-09

## 2019-12-04 RX ORDER — VENLAFAXINE HYDROCHLORIDE 75 MG/1
150 CAPSULE, EXTENDED RELEASE ORAL
Refills: 0 | COMMUNITY
Start: 2019-09-30 | End: 2022-01-26

## 2019-12-04 NOTE — PROGRESS NOTES
Subjective:       Patient ID: Pura Gudino is a 39 y.o. female.    Chief Complaint: Sore Throat    Pt here for 4 days of sore throat, cough, sinus tenderness. She has been having low grade temps. She denies green or yellow drainage.     Review of Systems   Constitutional: Negative for fatigue, fever and unexpected weight change.   HENT: Negative for congestion, postnasal drip, sinus pain and sore throat.    Eyes: Negative for visual disturbance.   Respiratory: Negative for cough, chest tightness, shortness of breath and wheezing.    Cardiovascular: Negative for chest pain, palpitations and leg swelling.   Gastrointestinal: Negative for abdominal pain, blood in stool, constipation, diarrhea, nausea and vomiting.   Endocrine: Negative for cold intolerance and heat intolerance.   Genitourinary: Negative for difficulty urinating, dysuria and frequency.   Musculoskeletal: Negative for back pain, joint swelling and myalgias.   Skin: Negative for rash.   Allergic/Immunologic: Negative for environmental allergies.   Neurological: Negative for dizziness, seizures, numbness and headaches.   Hematological: Does not bruise/bleed easily.   Psychiatric/Behavioral: Negative for agitation and sleep disturbance.       Objective:      Physical Exam   Constitutional: She is oriented to person, place, and time. She appears well-developed and well-nourished.   HENT:   Head: Normocephalic and atraumatic.   Mouth/Throat: Oropharynx is clear and moist.   Eyes: Pupils are equal, round, and reactive to light. Conjunctivae and EOM are normal.   Neck: Normal range of motion. Neck supple. No thyromegaly present.   Cardiovascular: Normal rate, regular rhythm, normal heart sounds and intact distal pulses. Exam reveals no gallop and no friction rub.   No murmur heard.  Pulmonary/Chest: Effort normal and breath sounds normal. No respiratory distress. She has no wheezes. She has no rales.   Abdominal: Soft. Bowel sounds are normal. She  exhibits no distension. There is no tenderness.   Musculoskeletal: Normal range of motion. She exhibits no edema.   Lymphadenopathy:     She has no cervical adenopathy.   Neurological: She is alert and oriented to person, place, and time. No cranial nerve deficit.   Skin: Skin is warm and dry.   Psychiatric: She has a normal mood and affect. Her behavior is normal.       Assessment:       1. Bacterial sinusitis        Plan:       Sinusitis- sent in zpack

## 2019-12-06 ENCOUNTER — OFFICE VISIT (OUTPATIENT)
Dept: FAMILY MEDICINE | Facility: CLINIC | Age: 39
End: 2019-12-06
Payer: COMMERCIAL

## 2019-12-06 VITALS
WEIGHT: 206.38 LBS | DIASTOLIC BLOOD PRESSURE: 84 MMHG | RESPIRATION RATE: 20 BRPM | OXYGEN SATURATION: 97 % | HEART RATE: 102 BPM | SYSTOLIC BLOOD PRESSURE: 108 MMHG | TEMPERATURE: 99 F | HEIGHT: 68 IN | BODY MASS INDEX: 31.28 KG/M2

## 2019-12-06 DIAGNOSIS — J45.20 MILD INTERMITTENT ASTHMA, UNSPECIFIED WHETHER COMPLICATED: ICD-10-CM

## 2019-12-06 DIAGNOSIS — J45.41 MODERATE PERSISTENT REACTIVE AIRWAY DISEASE WITH ACUTE EXACERBATION: Primary | ICD-10-CM

## 2019-12-06 DIAGNOSIS — R05.9 COUGH: ICD-10-CM

## 2019-12-06 PROCEDURE — 3079F DIAST BP 80-89 MM HG: CPT | Mod: CPTII,S$GLB,, | Performed by: NURSE PRACTITIONER

## 2019-12-06 PROCEDURE — 96372 THER/PROPH/DIAG INJ SC/IM: CPT | Mod: S$GLB,,, | Performed by: NURSE PRACTITIONER

## 2019-12-06 PROCEDURE — 3008F PR BODY MASS INDEX (BMI) DOCUMENTED: ICD-10-PCS | Mod: CPTII,S$GLB,, | Performed by: NURSE PRACTITIONER

## 2019-12-06 PROCEDURE — 99213 OFFICE O/P EST LOW 20 MIN: CPT | Mod: S$GLB,,, | Performed by: NURSE PRACTITIONER

## 2019-12-06 PROCEDURE — 99213 PR OFFICE/OUTPT VISIT, EST, LEVL III, 20-29 MIN: ICD-10-PCS | Mod: S$GLB,,, | Performed by: NURSE PRACTITIONER

## 2019-12-06 PROCEDURE — 3074F SYST BP LT 130 MM HG: CPT | Mod: CPTII,S$GLB,, | Performed by: NURSE PRACTITIONER

## 2019-12-06 PROCEDURE — 3008F BODY MASS INDEX DOCD: CPT | Mod: CPTII,S$GLB,, | Performed by: NURSE PRACTITIONER

## 2019-12-06 PROCEDURE — 99999 PR PBB SHADOW E&M-EST. PATIENT-LVL V: ICD-10-PCS | Mod: PBBFAC,,, | Performed by: NURSE PRACTITIONER

## 2019-12-06 PROCEDURE — 96372 PR INJECTION,THERAP/PROPH/DIAG2ST, IM OR SUBCUT: ICD-10-PCS | Mod: S$GLB,,, | Performed by: NURSE PRACTITIONER

## 2019-12-06 PROCEDURE — 3079F PR MOST RECENT DIASTOLIC BLOOD PRESSURE 80-89 MM HG: ICD-10-PCS | Mod: CPTII,S$GLB,, | Performed by: NURSE PRACTITIONER

## 2019-12-06 PROCEDURE — 99999 PR PBB SHADOW E&M-EST. PATIENT-LVL V: CPT | Mod: PBBFAC,,, | Performed by: NURSE PRACTITIONER

## 2019-12-06 PROCEDURE — 3074F PR MOST RECENT SYSTOLIC BLOOD PRESSURE < 130 MM HG: ICD-10-PCS | Mod: CPTII,S$GLB,, | Performed by: NURSE PRACTITIONER

## 2019-12-06 RX ORDER — FLUTICASONE PROPIONATE 44 UG/1
2 AEROSOL, METERED RESPIRATORY (INHALATION) 2 TIMES DAILY
Qty: 10.6 G | Refills: 0 | Status: SHIPPED | OUTPATIENT
Start: 2019-12-06 | End: 2021-01-28

## 2019-12-06 RX ORDER — BETAMETHASONE SODIUM PHOSPHATE AND BETAMETHASONE ACETATE 3; 3 MG/ML; MG/ML
6 INJECTION, SUSPENSION INTRA-ARTICULAR; INTRALESIONAL; INTRAMUSCULAR; SOFT TISSUE
Status: COMPLETED | OUTPATIENT
Start: 2019-12-06 | End: 2019-12-06

## 2019-12-06 RX ORDER — PREDNISONE 20 MG/1
TABLET ORAL
Qty: 18 TABLET | Refills: 0 | Status: SHIPPED | OUTPATIENT
Start: 2019-12-06 | End: 2020-01-13

## 2019-12-06 RX ADMIN — BETAMETHASONE SODIUM PHOSPHATE AND BETAMETHASONE ACETATE 6 MG: 3; 3 INJECTION, SUSPENSION INTRA-ARTICULAR; INTRALESIONAL; INTRAMUSCULAR; SOFT TISSUE at 02:12

## 2019-12-06 NOTE — PROGRESS NOTES
Subjective:       Patient ID: Pura Gudino is a 39 y.o. female.    Chief Complaint: Sore Throat (SOB ); Cough; and Generalized Body Aches (feels worse than she did on wed. )    Ms. Gudino is a new patient to me.  She here as a follow up to our family medicine clinic.  She was here in our clinic two days on Wednesday, she was treated and feels as if she is not improving. She is taking her prescribed mediation but feels worse than she did on Wednesday.    Vitals:    12/06/19 1426   BP: 108/84   Pulse: 102   Resp: 20   Temp: 98.7 °F (37.1 °C)     Review of Systems   Constitutional: Positive for activity change, appetite change and fatigue. Negative for chills, diaphoresis, fever and unexpected weight change.   HENT: Positive for congestion, ear pain, postnasal drip, rhinorrhea, sinus pressure, sinus pain, sneezing and sore throat. Negative for ear discharge, facial swelling, hearing loss, tinnitus, trouble swallowing and voice change.    Eyes: Negative for pain, discharge, redness and itching.   Respiratory: Positive for cough, chest tightness and wheezing. Negative for apnea, choking, shortness of breath and stridor.    Cardiovascular: Negative for chest pain, palpitations and leg swelling.   Gastrointestinal: Negative for abdominal pain, blood in stool, constipation, diarrhea, nausea and vomiting.   Genitourinary: Negative for difficulty urinating, flank pain, hematuria and urgency.   Musculoskeletal: Negative for back pain, joint swelling, myalgias and neck pain.   Skin: Negative for color change, pallor and rash.   Neurological: Positive for weakness, light-headedness and headaches. Negative for dizziness, seizures, syncope, speech difficulty and numbness.   Psychiatric/Behavioral: Negative for behavioral problems, confusion, decreased concentration and dysphoric mood. The patient is not nervous/anxious.        Past Medical History:   Diagnosis Date    Allergy     Anemia     Asthma     Chronic  diarrhea     Elevated C-reactive protein (CRP)     Fibromyalgia     GERD (gastroesophageal reflux disease)     Headache(784.0)     Irritable bowel syndrome     Mental disorder     Bipolar Disorder, Anxiety, Depression    Motion sickness     Motion sickness     PCOS (polycystic ovarian syndrome)     Rheumatoid arthritis     Seizures     childhood       Objective:      Physical Exam   Constitutional: She is oriented to person, place, and time. Vital signs are normal. She appears well-developed and well-nourished. She does not have a sickly appearance. She does not appear ill. No distress.   HENT:   Head: Normocephalic and atraumatic. Head is without right periorbital erythema and without left periorbital erythema.   Right Ear: Hearing, tympanic membrane, external ear and ear canal normal.   Left Ear: Hearing, tympanic membrane, external ear and ear canal normal.   Nose: Mucosal edema and rhinorrhea present. No sinus tenderness. No epistaxis. Right sinus exhibits no maxillary sinus tenderness and no frontal sinus tenderness. Left sinus exhibits no maxillary sinus tenderness and no frontal sinus tenderness.   Mouth/Throat: Oropharynx is clear and moist.   Eyes: Pupils are equal, round, and reactive to light. Conjunctivae and lids are normal. Right eye exhibits no discharge. Left eye exhibits no discharge.   Neck: Trachea normal and normal range of motion.   Cardiovascular: Normal rate, regular rhythm, S1 normal, S2 normal, normal heart sounds, intact distal pulses and normal pulses.   No murmur heard.  Pulmonary/Chest: Effort normal. No accessory muscle usage or stridor. No apnea, no tachypnea and no bradypnea. No respiratory distress. She has no decreased breath sounds. She has wheezes in the right lower field. She has no rhonchi. She has no rales. She exhibits no tenderness.   Abdominal: Soft. Bowel sounds are normal. There is no tenderness. There is no rebound and no guarding.   Musculoskeletal: Normal  range of motion. She exhibits no edema or tenderness.   Neurological: She is alert and oriented to person, place, and time.   Skin: Skin is warm and dry. Capillary refill takes 2 to 3 seconds. No rash noted. She is not diaphoretic. No erythema. No pallor.   Psychiatric: She has a normal mood and affect. Her speech is normal and behavior is normal. Judgment and thought content normal. Cognition and memory are normal.       Assessment:       1. Moderate persistent reactive airway disease with acute exacerbation    2. Cough    3. Mild intermittent asthma, unspecified whether complicated        Plan:       Moderate persistent reactive airway disease with acute exacerbation  Patient stated she had asthma as a child, she has not had any recent exacerbations, but she also does not have an inhaler to use.  -     fluticasone propionate (FLOVENT HFA) 44 mcg/actuation inhaler; Inhale 2 puffs into the lungs 2 (two) times daily. Controller  Dispense: 10.6 g; Refill: 0    Cough    -     predniSONE (DELTASONE) 20 MG tablet; Take 3 tablets daily for 3 days, then 2 tablets daily for 3 days, then 1 tablet daily for 3 days  Dispense: 18 tablet; Refill: 0  -     betamethasone acetate-betamethasone sodium phosphate injection 6 mg            Follow up in about 3 days (around 12/9/2019), or if symptoms worsen or fail to improve.

## 2019-12-07 NOTE — PATIENT INSTRUCTIONS
Educated on supportive care and return precautions to the clinic.    Follow up for further evaluation if S/S worsen or fail to improve.    Patient will call clinic on Monday to report patient status.

## 2019-12-09 ENCOUNTER — TELEPHONE (OUTPATIENT)
Dept: FAMILY MEDICINE | Facility: CLINIC | Age: 39
End: 2019-12-09

## 2019-12-09 NOTE — TELEPHONE ENCOUNTER
----- Message from Nandini Broussard sent at 12/6/2019  4:49 PM CST -----  Contact: pt  Pt states that she was in today and the NP was supposed to be prescribing her an inhaler and none was called in and she is going to need one today,please...[phone] (home)           .Pharmacy-[facility] in [place] on [id number] she thinks

## 2019-12-09 NOTE — TELEPHONE ENCOUNTER
Spoke with patient, she confirmed that she received all of her prescriptions from the pharmacy.  She was feeling better than last week.  She was still coughing and having congestion but was slowly improving. Patient had no further questions or concerns.

## 2020-01-13 ENCOUNTER — OFFICE VISIT (OUTPATIENT)
Dept: FAMILY MEDICINE | Facility: CLINIC | Age: 40
End: 2020-01-13
Payer: COMMERCIAL

## 2020-01-13 VITALS
HEART RATE: 68 BPM | HEIGHT: 68 IN | TEMPERATURE: 98 F | OXYGEN SATURATION: 98 % | WEIGHT: 211.63 LBS | DIASTOLIC BLOOD PRESSURE: 80 MMHG | SYSTOLIC BLOOD PRESSURE: 126 MMHG | BODY MASS INDEX: 32.07 KG/M2

## 2020-01-13 DIAGNOSIS — J02.9 SORE THROAT: ICD-10-CM

## 2020-01-13 DIAGNOSIS — H92.03 EAR PAIN, BILATERAL: Primary | ICD-10-CM

## 2020-01-13 DIAGNOSIS — H66.93 BILATERAL OTITIS MEDIA, UNSPECIFIED OTITIS MEDIA TYPE: ICD-10-CM

## 2020-01-13 DIAGNOSIS — R21 RASH OF FACE: ICD-10-CM

## 2020-01-13 PROCEDURE — 3079F DIAST BP 80-89 MM HG: CPT | Mod: CPTII,S$GLB,, | Performed by: NURSE PRACTITIONER

## 2020-01-13 PROCEDURE — 3074F PR MOST RECENT SYSTOLIC BLOOD PRESSURE < 130 MM HG: ICD-10-PCS | Mod: CPTII,S$GLB,, | Performed by: NURSE PRACTITIONER

## 2020-01-13 PROCEDURE — 99213 PR OFFICE/OUTPT VISIT, EST, LEVL III, 20-29 MIN: ICD-10-PCS | Mod: S$GLB,,, | Performed by: NURSE PRACTITIONER

## 2020-01-13 PROCEDURE — 3008F BODY MASS INDEX DOCD: CPT | Mod: CPTII,S$GLB,, | Performed by: NURSE PRACTITIONER

## 2020-01-13 PROCEDURE — 99213 OFFICE O/P EST LOW 20 MIN: CPT | Mod: S$GLB,,, | Performed by: NURSE PRACTITIONER

## 2020-01-13 PROCEDURE — 3079F PR MOST RECENT DIASTOLIC BLOOD PRESSURE 80-89 MM HG: ICD-10-PCS | Mod: CPTII,S$GLB,, | Performed by: NURSE PRACTITIONER

## 2020-01-13 PROCEDURE — 3008F PR BODY MASS INDEX (BMI) DOCUMENTED: ICD-10-PCS | Mod: CPTII,S$GLB,, | Performed by: NURSE PRACTITIONER

## 2020-01-13 PROCEDURE — 3074F SYST BP LT 130 MM HG: CPT | Mod: CPTII,S$GLB,, | Performed by: NURSE PRACTITIONER

## 2020-01-13 RX ORDER — DESONIDE 0.5 MG/G
CREAM TOPICAL 2 TIMES DAILY
Qty: 60 G | Refills: 0 | Status: SHIPPED | OUTPATIENT
Start: 2020-01-13 | End: 2021-11-04

## 2020-01-13 RX ORDER — DOXYCYCLINE 100 MG/1
100 CAPSULE ORAL 2 TIMES DAILY
Qty: 14 CAPSULE | Refills: 0 | Status: SHIPPED | OUTPATIENT
Start: 2020-01-13 | End: 2020-01-20

## 2020-01-13 NOTE — PROGRESS NOTES
Subjective:       Patient ID: Pura Gudino is a 39 y.o. female.    Chief Complaint: Sore Throat; Sinus Problem; and Otalgia    Ms. Gudino is not a new patient to me.  She is here for a sore throat and rash on her face.  This began a few days ago.  She rash is red, itchy and slight scaly in texture.  She has not tried anything for the sore throat or the rash.  She denies fever.    Vitals:    01/13/20 1633   BP: 126/80   Pulse: 68   Temp: 98.3 °F (36.8 °C)     Review of Systems   Constitutional: Positive for chills, fatigue and fever. Negative for activity change and appetite change.   HENT: Positive for congestion, ear pain, postnasal drip, rhinorrhea and sore throat. Negative for sinus pressure, sneezing and trouble swallowing.    Eyes: Negative for pain, redness and visual disturbance.   Respiratory: Negative for cough, choking, chest tightness, shortness of breath and wheezing.    Cardiovascular: Negative for chest pain, palpitations and leg swelling.   Gastrointestinal: Negative for abdominal pain, blood in stool, constipation, diarrhea, nausea and vomiting.   Endocrine: Negative.    Genitourinary: Negative for decreased urine volume, difficulty urinating, dysuria, flank pain, hematuria and urgency.   Musculoskeletal: Negative for back pain, gait problem, myalgias and neck pain.   Skin: Positive for rash (red, scaly, itchy). Negative for color change and pallor.   Allergic/Immunologic: Negative.    Neurological: Negative for dizziness, speech difficulty, weakness, light-headedness, numbness and headaches.   Hematological: Does not bruise/bleed easily.   Psychiatric/Behavioral: Negative for self-injury and suicidal ideas. The patient is not nervous/anxious.        Past Medical History:   Diagnosis Date    Allergy     Anemia     Asthma     Chronic diarrhea     Elevated C-reactive protein (CRP)     Fibromyalgia     GERD (gastroesophageal reflux disease)     Headache(784.0)     Irritable bowel  syndrome     Mental disorder     Bipolar Disorder, Anxiety, Depression    Motion sickness     Motion sickness     PCOS (polycystic ovarian syndrome)     Rheumatoid arthritis     Seizures     childhood       Objective:      Physical Exam   Constitutional: She is oriented to person, place, and time. Vital signs are normal. She appears well-developed and well-nourished.   HENT:   Head: Normocephalic and atraumatic. Head is with right periorbital erythema and with left periorbital erythema.   Right Ear: External ear normal. Tympanic membrane is erythematous. A middle ear effusion is present.   Left Ear: External ear normal. Tympanic membrane is erythematous and bulging. A middle ear effusion is present.   Nose: Mucosal edema and rhinorrhea present.   Mouth/Throat: Mucous membranes are normal. Uvula swelling present. Posterior oropharyngeal edema and posterior oropharyngeal erythema present. Tonsils are 3+ on the right. Tonsils are 3+ on the left.   Eyes: Pupils are equal, round, and reactive to light. Conjunctivae, EOM and lids are normal.   Neck: Normal range of motion. Neck supple.   Cardiovascular: Normal rate, regular rhythm, normal heart sounds and intact distal pulses.   Pulmonary/Chest: Effort normal and breath sounds normal.   Abdominal: Soft. Bowel sounds are normal.   Musculoskeletal: Normal range of motion.   Neurological: She is alert and oriented to person, place, and time.   Skin: Skin is warm and dry. Capillary refill takes less than 2 seconds. Rash (butterfly rash ) noted. Rash is urticarial.   Psychiatric: She has a normal mood and affect. Her behavior is normal. Judgment and thought content normal.   Nursing note and vitals reviewed.      Assessment:       1. Ear pain, bilateral    2. Rash of face    3. Sore throat    4. Bilateral otitis media, unspecified otitis media type        Plan:       Ear pain, bilateral  -     desonide (DESOWEN) 0.05 % cream; Apply topically 2 (two) times daily.   Dispense: 60 g; Refill: 0  -     doxycycline (VIBRAMYCIN) 100 MG Cap; Take 1 capsule (100 mg total) by mouth 2 (two) times daily. for 7 days  Dispense: 14 capsule; Refill: 0    Rash of face  Apply Desowen to the face twice daily    Sore throat        Will follow up with rheumatology for butterfly type rash on face     Follow up in about 1 month (around 2/13/2020), or if symptoms worsen or fail to improve.

## 2020-01-14 NOTE — PATIENT INSTRUCTIONS
Educated on supportive care and return precautions to the clinic.  Follow up for further evaluation if S/S worsen or fail to improve.  Has a follow up appt with Rheumatology  Begin antibiotics today  Apply ointment to face twice daily

## 2020-01-30 RX ORDER — TOPIRAMATE 100 MG/1
TABLET, FILM COATED ORAL
Qty: 60 TABLET | Refills: 5 | Status: SHIPPED | OUTPATIENT
Start: 2020-01-30 | End: 2020-07-28

## 2020-02-26 ENCOUNTER — OFFICE VISIT (OUTPATIENT)
Dept: FAMILY MEDICINE | Facility: CLINIC | Age: 40
End: 2020-02-26
Payer: COMMERCIAL

## 2020-02-26 VITALS
BODY MASS INDEX: 31.67 KG/M2 | SYSTOLIC BLOOD PRESSURE: 118 MMHG | DIASTOLIC BLOOD PRESSURE: 60 MMHG | OXYGEN SATURATION: 97 % | RESPIRATION RATE: 20 BRPM | HEIGHT: 68 IN | TEMPERATURE: 98 F | WEIGHT: 209 LBS | HEART RATE: 80 BPM

## 2020-02-26 DIAGNOSIS — J01.90 ACUTE BACTERIAL SINUSITIS: Primary | ICD-10-CM

## 2020-02-26 DIAGNOSIS — J02.8 SORE THROAT (VIRAL): ICD-10-CM

## 2020-02-26 DIAGNOSIS — R05.9 COUGH: ICD-10-CM

## 2020-02-26 DIAGNOSIS — B96.89 ACUTE BACTERIAL SINUSITIS: Primary | ICD-10-CM

## 2020-02-26 DIAGNOSIS — B97.89 SORE THROAT (VIRAL): ICD-10-CM

## 2020-02-26 DIAGNOSIS — R53.83 FATIGUE, UNSPECIFIED TYPE: ICD-10-CM

## 2020-02-26 PROCEDURE — 3074F SYST BP LT 130 MM HG: CPT | Mod: CPTII,S$GLB,, | Performed by: NURSE PRACTITIONER

## 2020-02-26 PROCEDURE — 3008F PR BODY MASS INDEX (BMI) DOCUMENTED: ICD-10-PCS | Mod: CPTII,S$GLB,, | Performed by: NURSE PRACTITIONER

## 2020-02-26 PROCEDURE — 3078F PR MOST RECENT DIASTOLIC BLOOD PRESSURE < 80 MM HG: ICD-10-PCS | Mod: CPTII,S$GLB,, | Performed by: NURSE PRACTITIONER

## 2020-02-26 PROCEDURE — 3008F BODY MASS INDEX DOCD: CPT | Mod: CPTII,S$GLB,, | Performed by: NURSE PRACTITIONER

## 2020-02-26 PROCEDURE — 3074F PR MOST RECENT SYSTOLIC BLOOD PRESSURE < 130 MM HG: ICD-10-PCS | Mod: CPTII,S$GLB,, | Performed by: NURSE PRACTITIONER

## 2020-02-26 PROCEDURE — 99214 OFFICE O/P EST MOD 30 MIN: CPT | Mod: S$GLB,,, | Performed by: NURSE PRACTITIONER

## 2020-02-26 PROCEDURE — 99999 PR PBB SHADOW E&M-EST. PATIENT-LVL IV: ICD-10-PCS | Mod: PBBFAC,,, | Performed by: NURSE PRACTITIONER

## 2020-02-26 PROCEDURE — 99999 PR PBB SHADOW E&M-EST. PATIENT-LVL IV: CPT | Mod: PBBFAC,,, | Performed by: NURSE PRACTITIONER

## 2020-02-26 PROCEDURE — 99214 PR OFFICE/OUTPT VISIT, EST, LEVL IV, 30-39 MIN: ICD-10-PCS | Mod: S$GLB,,, | Performed by: NURSE PRACTITIONER

## 2020-02-26 PROCEDURE — 3078F DIAST BP <80 MM HG: CPT | Mod: CPTII,S$GLB,, | Performed by: NURSE PRACTITIONER

## 2020-02-26 RX ORDER — PROMETHAZINE HYDROCHLORIDE 6.25 MG/5ML
25 SYRUP ORAL EVERY 6 HOURS PRN
Qty: 240 ML | Refills: 0 | Status: SHIPPED | OUTPATIENT
Start: 2020-02-26 | End: 2021-01-28

## 2020-02-26 RX ORDER — AZITHROMYCIN 250 MG/1
250 TABLET, FILM COATED ORAL DAILY
Qty: 6 TABLET | Refills: 0 | Status: SHIPPED | OUTPATIENT
Start: 2020-02-26 | End: 2020-03-02

## 2020-02-26 NOTE — PROGRESS NOTES
Subjective:       Patient ID: Pura Gudino is a 40 y.o. female.    Chief Complaint: Sore Throat (x 5 days); Headache; and Cough    Ms. Gudino is here today for symptoms of a sinus infection.  She has visited our clinic before with the same symptoms of ear pain, congestion, cough and sinus pressure.  She is followed by rheumatology.  No one else is sick at home currently.  She denies fever at this time.    Vitals:    02/26/20 0851   BP: 118/60   Pulse: 80   Resp: 20   Temp: 98.2 °F (36.8 °C)     Review of Systems   Constitutional: Positive for fatigue.   HENT: Positive for congestion, ear pain and sore throat. Negative for drooling, ear discharge and trouble swallowing.    Respiratory: Positive for cough and shortness of breath. Negative for stridor.    Gastrointestinal: Negative for abdominal pain, diarrhea and vomiting.   Musculoskeletal: Negative for neck pain.   Neurological: Positive for headaches.       Past Medical History:   Diagnosis Date    Allergy     Anemia     Asthma     Chronic diarrhea     Elevated C-reactive protein (CRP)     Fibromyalgia     GERD (gastroesophageal reflux disease)     Headache(784.0)     Irritable bowel syndrome     Mental disorder     Bipolar Disorder, Anxiety, Depression    Motion sickness     Motion sickness     PCOS (polycystic ovarian syndrome)     Rheumatoid arthritis     Seizures     childhood       Objective:      Physical Exam   Constitutional: She is oriented to person, place, and time. Vital signs are normal. She appears well-developed and well-nourished. She is cooperative. No distress.   HENT:   Head: Normocephalic and atraumatic. Head is with left periorbital erythema. Head is without right periorbital erythema.   Right Ear: Hearing and external ear normal. Tympanic membrane is erythematous and bulging.   Left Ear: Hearing and external ear normal. Tympanic membrane is erythematous and bulging.   Nose: Mucosal edema and rhinorrhea present. Right  sinus exhibits maxillary sinus tenderness and frontal sinus tenderness. Left sinus exhibits maxillary sinus tenderness and frontal sinus tenderness.   Mouth/Throat: Uvula is midline and mucous membranes are normal. Uvula swelling present. Posterior oropharyngeal edema and posterior oropharyngeal erythema present.   Eyes: Pupils are equal, round, and reactive to light. Conjunctivae, EOM and lids are normal.   Neck: Trachea normal, normal range of motion and full passive range of motion without pain. Neck supple.   Cardiovascular: Normal rate, regular rhythm, S1 normal, S2 normal, normal heart sounds, intact distal pulses and normal pulses.   Pulmonary/Chest: Effort normal and breath sounds normal. No tachypnea and no bradypnea. No respiratory distress. She has no decreased breath sounds.   Abdominal: Soft. Normal appearance and bowel sounds are normal. There is no tenderness.   Musculoskeletal: Normal range of motion.   Neurological: She is alert and oriented to person, place, and time.   Skin: Skin is warm and dry. Capillary refill takes less than 2 seconds. No lesion, no petechiae and no rash noted. She is not diaphoretic. No erythema.   Psychiatric: She has a normal mood and affect. Her speech is normal and behavior is normal. Judgment and thought content normal.   Nursing note and vitals reviewed.      Assessment:       1. Acute bacterial sinusitis    2. Cough    3. Fatigue, unspecified type    4. Sore throat (viral)        Plan:       Acute bacterial sinusitis  -     azithromycin (Z-AKIKO) 250 MG tablet; Take 1 tablet (250 mg total) by mouth once daily. Take 2 tablets on day 1, then one tablet on days 2-5. for 5 days  Dispense: 6 tablet; Refill: 0    Cough  -     promethazine (PHENERGAN) 6.25 mg/5 mL syrup; Take 20 mLs (25 mg total) by mouth every 6 (six) hours as needed (cough).  Dispense: 240 mL; Refill: 0    Fatigue, unspecified type    Sore throat (viral)            Follow up in about 5 days (around 3/2/2020),  or if symptoms worsen or fail to improve.

## 2020-02-26 NOTE — PATIENT INSTRUCTIONS
Educated on supportive care and return precautions to the clinic.  Follow up for further evaluation if S/S worsen or fail to improve.  Call for temperature greater than 101, pain uncontrolled by oral medications, Nausea/vomiting, inability to tolerate oral medications, chest pain, shortness of breath, altered mental status, or any acute events.  To help manage acute sinusitis:  Please use over the counter tylenol, advil or aleve for pain and discomfort.  Please use over the counter antihistamine/allergy medication daily such as Xyzal, allegra, claritin or zyrtec.  Please use a intranasal steroid inhaler such as flonase, rhinocourt, astelin or veramyst. 2 inhaled puffs in each nostril twice a day.  Oral decongestant as needed such as sudafed, not to exceed 4 days.  Any mucolytic's such as mucinex to thin out your mucus.  Cough Supressant such as dextrmorphane, cough drops  Cool air humidifier for night time.  Start Z-Jeanine today  Do not take phenergan when you have to drive a vehicle, it may make you drowsy.  Rest, stay hydrated, and stay away from people.

## 2020-03-02 ENCOUNTER — PATIENT MESSAGE (OUTPATIENT)
Dept: FAMILY MEDICINE | Facility: CLINIC | Age: 40
End: 2020-03-02

## 2020-03-19 ENCOUNTER — PATIENT MESSAGE (OUTPATIENT)
Dept: FAMILY MEDICINE | Facility: CLINIC | Age: 40
End: 2020-03-19

## 2020-03-19 DIAGNOSIS — B37.9 ANTIBIOTIC-INDUCED YEAST INFECTION: Primary | ICD-10-CM

## 2020-03-19 DIAGNOSIS — T36.95XA ANTIBIOTIC-INDUCED YEAST INFECTION: Primary | ICD-10-CM

## 2020-03-19 RX ORDER — FLUCONAZOLE 150 MG/1
150 TABLET ORAL ONCE
Qty: 1 TABLET | Refills: 0 | Status: SHIPPED | OUTPATIENT
Start: 2020-03-19 | End: 2020-03-19

## 2020-04-16 ENCOUNTER — PATIENT MESSAGE (OUTPATIENT)
Dept: FAMILY MEDICINE | Facility: CLINIC | Age: 40
End: 2020-04-16

## 2020-04-17 DIAGNOSIS — I10 ESSENTIAL HYPERTENSION: Primary | ICD-10-CM

## 2020-04-21 RX ORDER — PANTOPRAZOLE SODIUM 40 MG/1
TABLET, DELAYED RELEASE ORAL
Qty: 30 TABLET | Refills: 5 | Status: SHIPPED | OUTPATIENT
Start: 2020-04-21 | End: 2020-10-28

## 2020-04-30 DIAGNOSIS — Z12.39 BREAST CANCER SCREENING: ICD-10-CM

## 2020-05-13 ENCOUNTER — OFFICE VISIT (OUTPATIENT)
Dept: URGENT CARE | Facility: CLINIC | Age: 40
End: 2020-05-13
Payer: COMMERCIAL

## 2020-05-13 VITALS
DIASTOLIC BLOOD PRESSURE: 77 MMHG | SYSTOLIC BLOOD PRESSURE: 114 MMHG | TEMPERATURE: 98 F | HEART RATE: 77 BPM | WEIGHT: 200 LBS | OXYGEN SATURATION: 98 % | HEIGHT: 68 IN | BODY MASS INDEX: 30.31 KG/M2

## 2020-05-13 DIAGNOSIS — S61.209A AVULSION OF SKIN OF FINGER, INITIAL ENCOUNTER: Primary | ICD-10-CM

## 2020-05-13 PROCEDURE — 90714 TD VACC NO PRESV 7 YRS+ IM: CPT | Mod: S$GLB,,, | Performed by: PHYSICIAN ASSISTANT

## 2020-05-13 PROCEDURE — 90471 TD VACCINE GREATER THAN OR EQUAL TO 7YO WITH PRESERVATIVE IM: ICD-10-PCS | Mod: S$GLB,,, | Performed by: PHYSICIAN ASSISTANT

## 2020-05-13 PROCEDURE — 99214 PR OFFICE/OUTPT VISIT, EST, LEVL IV, 30-39 MIN: ICD-10-PCS | Mod: 25,S$GLB,, | Performed by: PHYSICIAN ASSISTANT

## 2020-05-13 PROCEDURE — 90471 IMMUNIZATION ADMIN: CPT | Mod: S$GLB,,, | Performed by: PHYSICIAN ASSISTANT

## 2020-05-13 PROCEDURE — 99214 OFFICE O/P EST MOD 30 MIN: CPT | Mod: 25,S$GLB,, | Performed by: PHYSICIAN ASSISTANT

## 2020-05-13 PROCEDURE — 90714 TD VACCINE GREATER THAN OR EQUAL TO 7YO WITH PRESERVATIVE IM: ICD-10-PCS | Mod: S$GLB,,, | Performed by: PHYSICIAN ASSISTANT

## 2020-05-13 RX ORDER — MUPIROCIN 20 MG/G
OINTMENT TOPICAL
Qty: 22 G | Refills: 1 | Status: SHIPPED | OUTPATIENT
Start: 2020-05-13 | End: 2021-11-04

## 2020-05-14 NOTE — PROGRESS NOTES
"Subjective:       Patient ID: Pura Gudino is a 40 y.o. female.    Vitals:  height is 5' 8" (1.727 m) and weight is 90.7 kg (200 lb). Her temperature is 97.7 °F (36.5 °C). Her blood pressure is 114/77 and her pulse is 77. Her oxygen saturation is 98%.     Chief Complaint: Laceration    Patient presents to clinic today with a laceration to the distal tip of her left middle finger. Patient is unsure when her last tetanus shot was. Bleeding is well controlled. She cut the top layer of her left middle finger off with a mandolin. Denies any numbness or paresthesias. ROM intact.     Laceration    The incident occurred less than 1 hour ago. The laceration is located on the left hand. The laceration mechanism was a metal edge. The pain is at a severity of 2/10. The pain is mild. She reports no foreign bodies present. Her tetanus status is unknown.       Constitution: Negative for chills, fatigue and fever.   HENT: Negative for congestion and sore throat.    Neck: Negative for painful lymph nodes.   Cardiovascular: Negative for chest pain and leg swelling.   Eyes: Negative for double vision and blurred vision.   Respiratory: Negative for cough and shortness of breath.    Gastrointestinal: Negative for nausea, vomiting and diarrhea.   Genitourinary: Negative for dysuria, frequency, urgency and history of kidney stones.   Musculoskeletal: Negative for joint pain, joint swelling, muscle cramps and muscle ache.   Skin: Positive for laceration. Negative for color change, pale, rash and bruising.   Allergic/Immunologic: Negative for seasonal allergies.   Neurological: Negative for dizziness, history of vertigo, light-headedness, passing out and headaches.   Hematologic/Lymphatic: Negative for swollen lymph nodes.   Psychiatric/Behavioral: Negative for nervous/anxious, sleep disturbance and depression. The patient is not nervous/anxious.        Objective:      Physical Exam   Constitutional: She is oriented to person, " place, and time. Vital signs are normal. She appears well-developed and well-nourished. She is active and cooperative. No distress.   HENT:   Head: Normocephalic and atraumatic.   Nose: Nose normal.   Mouth/Throat: Oropharynx is clear and moist and mucous membranes are normal.   Eyes: Conjunctivae and lids are normal.   Neck: Trachea normal, normal range of motion, full passive range of motion without pain and phonation normal. Neck supple.   Cardiovascular: Normal rate, regular rhythm, normal heart sounds, intact distal pulses and normal pulses. Exam reveals no gallop and no friction rub.   No murmur heard.  Pulmonary/Chest: Effort normal and breath sounds normal. No stridor. No respiratory distress. She has no wheezes. She has no rales.   Abdominal: Soft. Normal appearance and bowel sounds are normal. She exhibits no abdominal bruit, no pulsatile midline mass and no mass.   Musculoskeletal: She exhibits no edema or deformity.        Right hand: She exhibits laceration. She exhibits normal range of motion, no tenderness, no bony tenderness, normal two-point discrimination, normal capillary refill, no deformity and no swelling. Normal sensation noted. Decreased sensation is not present in the ulnar distribution, is not present in the medial distribution and is not present in the radial distribution. Normal strength noted. She exhibits no finger abduction, no thumb/finger opposition and no wrist extension trouble.        Hands:  Wound cleaned with Hibiclens and extensive irrigation with normal saline syringe performed. Wound is well circumscribed and no foreign bodies noted. No neurovascular compromise. Mupirocin applied and non-stick sterile dressing applied. Coban wrapped around finger. Patient tolerated well.    Neurological: She is alert and oriented to person, place, and time. She has normal strength and normal reflexes. No sensory deficit.   Skin: Skin is warm, dry, intact and not diaphoretic. Lacerations -  upper ext.:  right hand  Psychiatric: She has a normal mood and affect. Her speech is normal and behavior is normal. Judgment and thought content normal. Cognition and memory are normal.   Nursing note and vitals reviewed.                Assessment:       1. Avulsion of skin of finger, initial encounter        Plan:     tetanus status unknown so TD vaccine administered at time of visit. Pt tolerated well. Wound does not require suture. Wound cleaned and dressed. Pt tolerated well. Discussed to not soak wound and wash 2 times daily with Hibiclens and saline, pat dry, and apply mupirocin. Monitor for signs of infection and discussed return precautions should those present. Patient verbalized understanding and all of her questions were answered.     Avulsion of skin of finger, initial encounter  -     mupirocin (BACTROBAN) 2 % ointment; Apply to affected area 3 times daily  Dispense: 22 g; Refill: 1  -     (In Office Administered) Td Vaccine      Patient Instructions     Skin Tear (Skin Avulsion)  A skin avulsion is a tearing of the top layer of skin. This commonly happens after a fall or other injury. It also tends to be more common in older people, or those taking blood thinners or steroids for long periods of time.  Home care  These guidelines will help you care for your wound at home:  · Keep the wound clean and dry for the first 24 to 48 hours, or as your healthcare provider advises.  · If there is a dressing or bandage, change it when it gets wet or dirty. Otherwise, leave it on for the first 24 hours, then change it once a day or as often as the doctor says.  · If stitches or staples were used, check the wound every day.  · After taking off the dressing, wash the area gently with soap and water. Clean as close to the stitches as you can. Avoid washing or rubbing the stitches directly.  · After 3 days you can keep the bandages off the wound, unless told otherwise, or there is continued drainage.  Allow the wound  to be open to the air.  · Keep a thin layer of antibiotic ointment on the cut. This will keep the wound clean, make it easier to remove the stitches, and reduce scarring.  · If your wound is oozing, you can put a nonstick dressing over it. Then, reapply the bandage or dressing as you were told.  · You can shower as usual after the first 24 hours, but don't soak the area in water (no baths or swimming) until the stitches or staples are taken out.  · If surgical tape was used, keep the area clean and dry. If it becomes wet, blot it dry with a clean towel.  · If skin glue was used, don't put any creams, lotions, or antibiotic ointments on it. These can dissolve the glue. Usually the glue will flake off in about 5 to 10 days by itself. Try to resist picking it off before that so the wound doesn't open up. When it gets wet, pat it dry.  Here is some information about medicine:  · You may use over-the-counter medicine such as acetaminophen or ibuprofen to control pain, unless another pain medicine was given. If you have chronic liver or kidney disease or ever had a stomach ulcer or gastrointestinal bleeding, talk with your doctor before using these medicines.  · If you were given antibiotics, take them until they are all used up. It is important to finish the antibiotics even if the wound looks better. This will ensure that the infection has cleared.  Follow-up care  Follow up with your healthcare provider, or as advised.  · Watch for any signs of infection, such as increasing redness, swelling, or pus coming out. If this happens, don't wait for your scheduled visit. Instead, see a doctor sooner.  · Stitches or staples are usually taken out within 5 to 14 days. This varies depending on what part of your body they are on, and the type of wound. The doctor will tell you how long stitches should be left in.   · If surgical tape was used, it is usually left on for 7 to 10 days. You can remove surgical tape after that unless  you were told otherwise. If you try to remove it, and it is too hard, soaking can help. Surgical tape strips will eventually fall off on their own. If the edges of the cut pull apart, stop removing the tape or strips and follow up with your doctor  · As mentioned above, skin glue will flake off by itself in 5 to 10 days, so you don't need to pull it off.  If any X-rays were done, you will be notified of any changes that may affect your care.  When to seek medical advice  Call your healthcare provider right away if any of these occur:  · Increasing pain in the wound  · Redness, swelling, or pus coming from the wound  · Fever of 100.4ºF (38ºC) or higher, or as directed by your healthcare provider  · Sutures or staples come apart or fall out before your next appointment and the wound edges look as if they will re-open  · Surgical tape closures fall off before 7 days, and the wound edges look as if they will re-open  · Bleeding not controlled by direct pressure  Date Last Reviewed: 9/1/2016 © 2000-2017 [employer]. [address] [place] [address]. All rights reserved. This information is not intended as a substitute for professional medical care. Always follow your healthcare professional's instructions.      CLEAN FINGER 2 X DAILY WITH HIBICLENS AND SALINE WATER. PAT THE SURFACE DRY AND APPLY ANTIBIOTIC OINTMENT THEN PUT A BANDAGE ON IT. DO NOT SOAK YOUR FINGER. MONITOR FOR SIGNS OF INFECTION INCLUDING WARMTH, SWELLING, REDNESS SPREADING UP YOUR FINGER, PUS DRAINAGE, OR FEVER. IF THESE OCCUR, PLEASE RETURN TO THE URGENT CARE IMMEDIATELY.     Please follow up with your Primary care provider within 2-5 days if your signs and symptoms have not resolved or worsen.     If your condition worsens or fails to improve we recommend that you receive another evaluation at the emergency room immediately or contact your primary medical clinic to discuss your concerns.   You must understand that you have  received an Urgent Care treatment only and that you may be released before all of your medical problems are known or treated. You, the patient, will arrange for follow up care as instructed.     RED FLAGS/WARNING SYMPTOMS DISCUSSED WITH PATIENT THAT WOULD WARRANT EMERGENT MEDICAL ATTENTION. PATIENT VERBALIZED UNDERSTANDING.

## 2020-05-14 NOTE — PATIENT INSTRUCTIONS
Skin Tear (Skin Avulsion)  A skin avulsion is a tearing of the top layer of skin. This commonly happens after a fall or other injury. It also tends to be more common in older people, or those taking blood thinners or steroids for long periods of time.  Home care  These guidelines will help you care for your wound at home:  · Keep the wound clean and dry for the first 24 to 48 hours, or as your healthcare provider advises.  · If there is a dressing or bandage, change it when it gets wet or dirty. Otherwise, leave it on for the first 24 hours, then change it once a day or as often as the doctor says.  · If stitches or staples were used, check the wound every day.  · After taking off the dressing, wash the area gently with soap and water. Clean as close to the stitches as you can. Avoid washing or rubbing the stitches directly.  · After 3 days you can keep the bandages off the wound, unless told otherwise, or there is continued drainage.  Allow the wound to be open to the air.  · Keep a thin layer of antibiotic ointment on the cut. This will keep the wound clean, make it easier to remove the stitches, and reduce scarring.  · If your wound is oozing, you can put a nonstick dressing over it. Then, reapply the bandage or dressing as you were told.  · You can shower as usual after the first 24 hours, but don't soak the area in water (no baths or swimming) until the stitches or staples are taken out.  · If surgical tape was used, keep the area clean and dry. If it becomes wet, blot it dry with a clean towel.  · If skin glue was used, don't put any creams, lotions, or antibiotic ointments on it. These can dissolve the glue. Usually the glue will flake off in about 5 to 10 days by itself. Try to resist picking it off before that so the wound doesn't open up. When it gets wet, pat it dry.  Here is some information about medicine:  · You may use over-the-counter medicine such as acetaminophen or ibuprofen to control pain,  unless another pain medicine was given. If you have chronic liver or kidney disease or ever had a stomach ulcer or gastrointestinal bleeding, talk with your doctor before using these medicines.  · If you were given antibiotics, take them until they are all used up. It is important to finish the antibiotics even if the wound looks better. This will ensure that the infection has cleared.  Follow-up care  Follow up with your healthcare provider, or as advised.  · Watch for any signs of infection, such as increasing redness, swelling, or pus coming out. If this happens, don't wait for your scheduled visit. Instead, see a doctor sooner.  · Stitches or staples are usually taken out within 5 to 14 days. This varies depending on what part of your body they are on, and the type of wound. The doctor will tell you how long stitches should be left in.   · If surgical tape was used, it is usually left on for 7 to 10 days. You can remove surgical tape after that unless you were told otherwise. If you try to remove it, and it is too hard, soaking can help. Surgical tape strips will eventually fall off on their own. If the edges of the cut pull apart, stop removing the tape or strips and follow up with your doctor  · As mentioned above, skin glue will flake off by itself in 5 to 10 days, so you don't need to pull it off.  If any X-rays were done, you will be notified of any changes that may affect your care.  When to seek medical advice  Call your healthcare provider right away if any of these occur:  · Increasing pain in the wound  · Redness, swelling, or pus coming from the wound  · Fever of 100.4ºF (38ºC) or higher, or as directed by your healthcare provider  · Sutures or staples come apart or fall out before your next appointment and the wound edges look as if they will re-open  · Surgical tape closures fall off before 7 days, and the wound edges look as if they will re-open  · Bleeding not controlled by direct pressure  Date  Last Reviewed: 9/1/2016  © 0506-2402 [employer]. [address] [place] [address], [place] [address]. All rights reserved. This information is not intended as a substitute for professional medical care. Always follow your healthcare professional's instructions.      CLEAN FINGER 2 X DAILY WITH HIBICLENS AND SALINE WATER. PAT THE SURFACE DRY AND APPLY ANTIBIOTIC OINTMENT THEN PUT A BANDAGE ON IT. DO NOT SOAK YOUR FINGER. MONITOR FOR SIGNS OF INFECTION INCLUDING WARMTH, SWELLING, REDNESS SPREADING UP YOUR FINGER, PUS DRAINAGE, OR FEVER. IF THESE OCCUR, PLEASE RETURN TO THE URGENT CARE IMMEDIATELY.     Please follow up with your Primary care provider within 2-5 days if your signs and symptoms have not resolved or worsen.     If your condition worsens or fails to improve we recommend that you receive another evaluation at the emergency room immediately or contact your primary medical clinic to discuss your concerns.   You must understand that you have received an Urgent Care treatment only and that you may be released before all of your medical problems are known or treated. You, the patient, will arrange for follow up care as instructed.     RED FLAGS/WARNING SYMPTOMS DISCUSSED WITH PATIENT THAT WOULD WARRANT EMERGENT MEDICAL ATTENTION. PATIENT VERBALIZED UNDERSTANDING.

## 2020-06-10 ENCOUNTER — OFFICE VISIT (OUTPATIENT)
Dept: DERMATOLOGY | Facility: CLINIC | Age: 40
End: 2020-06-10
Payer: COMMERCIAL

## 2020-06-10 DIAGNOSIS — L30.9 DERMATITIS: Primary | ICD-10-CM

## 2020-06-10 PROCEDURE — 99202 OFFICE O/P NEW SF 15 MIN: CPT | Mod: 95,,, | Performed by: STUDENT IN AN ORGANIZED HEALTH CARE EDUCATION/TRAINING PROGRAM

## 2020-06-10 PROCEDURE — 99202 PR OFFICE/OUTPT VISIT, NEW, LEVL II, 15-29 MIN: ICD-10-PCS | Mod: 95,,, | Performed by: STUDENT IN AN ORGANIZED HEALTH CARE EDUCATION/TRAINING PROGRAM

## 2020-06-10 RX ORDER — DOXYCYCLINE HYCLATE 100 MG
100 TABLET ORAL DAILY
Qty: 30 TABLET | Refills: 1 | Status: SHIPPED | OUTPATIENT
Start: 2020-06-10 | End: 2021-09-15

## 2020-06-10 RX ORDER — BETAMETHASONE VALERATE 1.2 MG/G
CREAM TOPICAL 2 TIMES DAILY
Qty: 45 G | Refills: 1 | Status: SHIPPED | OUTPATIENT
Start: 2020-06-10 | End: 2021-11-04

## 2020-06-10 RX ORDER — CLOBETASOL PROPIONATE 0.46 MG/ML
SOLUTION TOPICAL 2 TIMES DAILY
Qty: 50 ML | Refills: 1 | Status: SHIPPED | OUTPATIENT
Start: 2020-06-10 | End: 2022-03-16

## 2020-06-10 NOTE — PROGRESS NOTES
"The patient location is: home  The chief complaint leading to consultation is: rash on leg and scalp    Visit type: audiovisual    Face to Face time with patient: 10min  10 minutes of total time spent on the encounter, which includes face to face time and non-face to face time preparing to see the patient (eg, review of tests), Obtaining and/or reviewing separately obtained history, Documenting clinical information in the electronic or other health record, Independently interpreting results (not separately reported) and communicating results to the patient/family/caregiver, or Care coordination (not separately reported).       Each patient to whom he or she provides medical services by telemedicine is:  (1) informed of the relationship between the physician and patient and the respective role of any other health care provider with respect to management of the patient; and (2) notified that he or she may decline to receive medical services by telemedicine and may withdraw from such care at any time.    Notes: Patient presents with a rash on the lower legs and scalp that has been off and on for several months. She complains of redness and itching of these areas. Denies any known triggers for her symptoms. She also complains of having "sores" in the scalp and admits to frequently scratching. Has tried TAC cream with minimal improvement in symptoms.     ROS: otherwise negative     PE: Const/Neuro - AAOx3, NAD          Skin - erythematous eczematous plaques and pustules, some in a linear configuration     A/P: 1) Dermatitis - doxycycline 100mg daily, betamethasone 0.1% cream and clobetasol 0.05% topical solution for the scalp. If no improvement in 6 weeks, consider in office visit for biopsy.       "

## 2020-06-30 ENCOUNTER — PATIENT OUTREACH (OUTPATIENT)
Dept: ADMINISTRATIVE | Facility: OTHER | Age: 40
End: 2020-06-30

## 2020-07-01 ENCOUNTER — OFFICE VISIT (OUTPATIENT)
Dept: DERMATOLOGY | Facility: CLINIC | Age: 40
End: 2020-07-01
Payer: COMMERCIAL

## 2020-07-01 ENCOUNTER — TELEPHONE (OUTPATIENT)
Dept: DERMATOLOGY | Facility: CLINIC | Age: 40
End: 2020-07-01

## 2020-07-01 DIAGNOSIS — L82.1 SEBORRHEIC KERATOSIS: ICD-10-CM

## 2020-07-01 DIAGNOSIS — L20.84 INTRINSIC ECZEMA: Primary | ICD-10-CM

## 2020-07-01 DIAGNOSIS — L98.9 DISEASE OF SKIN AND SUBCUTANEOUS TISSUE: ICD-10-CM

## 2020-07-01 PROCEDURE — 88313 SPECIAL STAINS GROUP 2: CPT | Mod: 26,,, | Performed by: PATHOLOGY

## 2020-07-01 PROCEDURE — 88313 PR  SPECIAL STAINS,GROUP II: ICD-10-PCS | Mod: 26,,, | Performed by: PATHOLOGY

## 2020-07-01 PROCEDURE — 11104 PR PUNCH BIOPSY, SKIN, SINGLE LESION: ICD-10-PCS | Mod: S$GLB,,, | Performed by: DERMATOLOGY

## 2020-07-01 PROCEDURE — 99214 PR OFFICE/OUTPT VISIT, EST, LEVL IV, 30-39 MIN: ICD-10-PCS | Mod: 25,S$GLB,, | Performed by: DERMATOLOGY

## 2020-07-01 PROCEDURE — 99999 PR PBB SHADOW E&M-EST. PATIENT-LVL III: ICD-10-PCS | Mod: PBBFAC,,, | Performed by: DERMATOLOGY

## 2020-07-01 PROCEDURE — 99214 OFFICE O/P EST MOD 30 MIN: CPT | Mod: 25,S$GLB,, | Performed by: DERMATOLOGY

## 2020-07-01 PROCEDURE — 88312 PR  SPECIAL STAINS,GROUP I: ICD-10-PCS | Mod: 26,,, | Performed by: PATHOLOGY

## 2020-07-01 PROCEDURE — 88312 SPECIAL STAINS GROUP 1: CPT | Mod: 26,,, | Performed by: PATHOLOGY

## 2020-07-01 PROCEDURE — 88312 SPECIAL STAINS GROUP 1: CPT | Performed by: PATHOLOGY

## 2020-07-01 PROCEDURE — 99999 PR PBB SHADOW E&M-EST. PATIENT-LVL III: CPT | Mod: PBBFAC,,, | Performed by: DERMATOLOGY

## 2020-07-01 PROCEDURE — 11104 PUNCH BX SKIN SINGLE LESION: CPT | Mod: S$GLB,,, | Performed by: DERMATOLOGY

## 2020-07-01 PROCEDURE — 88305 TISSUE EXAM BY PATHOLOGIST: CPT | Performed by: PATHOLOGY

## 2020-07-01 PROCEDURE — 88313 SPECIAL STAINS GROUP 2: CPT | Performed by: PATHOLOGY

## 2020-07-01 PROCEDURE — 88305 TISSUE EXAM BY PATHOLOGIST: CPT | Mod: 26,,, | Performed by: PATHOLOGY

## 2020-07-01 PROCEDURE — 88305 TISSUE EXAM BY PATHOLOGIST: ICD-10-PCS | Mod: 26,,, | Performed by: PATHOLOGY

## 2020-07-01 RX ORDER — MOMETASONE FUROATE 1 MG/G
CREAM TOPICAL
Qty: 50 G | Refills: 2 | Status: SHIPPED | OUTPATIENT
Start: 2020-07-01 | End: 2021-11-04

## 2020-07-01 NOTE — TELEPHONE ENCOUNTER
Spoke with patient, scheduled appointment today at 415 pm.  Advised she is in the [place] location, address given

## 2020-07-01 NOTE — PROGRESS NOTES
Subjective:       Patient ID:  Pura Gudino is a 40 y.o. female who presents for   Chief Complaint   Patient presents with    Rash     LOV: 07-   (Has seen Dr. Couch since then, virtual visit)     40 y.o. female who presents for a rash on her legs -  2 to 3 years   2 to 3 months - rash appeared on torso that looks different than rash on legs   Seen by Dr. Couch and was prescribed Doxycycline & TAC cream on her neck, hips, and elbow Which helped everything except the bottom of her legs.   Denies webspace itching,  not itching. No sick contacts.      Derm HX:   Mom - Unsure of which type   Grandfather - unsure of which type both surgically removed  Denies any phx of skin CA   bathes with dial or dove body wash  Moisturizing with vanicream or cerave  Followed by rheumatology on enbrel    Review of Systems   Constitutional: Negative for fever, chills and fatigue.   Respiratory: Negative for cough and shortness of breath.    Skin: Positive for itching, rash, sun sensitivity and daily sunscreen use. Negative for sensitivity to antibiotic ointment, sensitivity to bandage adhesive and recent sunburn.   Hematologic/Lymphatic: Does not bruise/bleed easily.      Past Medical History:   Diagnosis Date    Allergy     Anemia     Asthma     Chronic diarrhea     Elevated C-reactive protein (CRP)     Fibromyalgia     GERD (gastroesophageal reflux disease)     Headache(784.0)     Irritable bowel syndrome     Mental disorder     Bipolar Disorder, Anxiety, Depression    Motion sickness     Motion sickness     PCOS (polycystic ovarian syndrome)     Rheumatoid arthritis     Seizures     childhood       Objective:    Physical Exam   Constitutional: She appears well-developed and well-nourished. No distress.   HENT:   Mouth/Throat: Lips normal.    Eyes: Lids are normal.    Cardiovascular: There is no local extremity swelling and no dependent edema.     Neurological: She is alert and oriented to  person, place, and time. She is not disoriented.   Psychiatric: She has a normal mood and affect. She is not agitated.   Skin:   Areas Examined (abnormalities noted in diagram):   Head / Face Inspection Performed  Neck Inspection Performed  Chest / Axilla Inspection Performed  Abdomen Inspection Performed  Back Inspection Performed  RUE Inspected  LUE Inspection Performed  RLE Inspected  LLE Inspection Performed              Diagram Legend     Erythematous scaling macule/papule c/w actinic keratosis       Vascular papule c/w angioma      Pigmented verrucoid papule/plaque c/w seborrheic keratosis      Yellow umbilicated papule c/w sebaceous hyperplasia      Irregularly shaped tan macule c/w lentigo     1-2 mm smooth white papules consistent with Milia      Movable subcutaneous cyst with punctum c/w epidermal inclusion cyst      Subcutaneous movable cyst c/w pilar cyst      Firm pink to brown papule c/w dermatofibroma      Pedunculated fleshy papule(s) c/w skin tag(s)      Evenly pigmented macule c/w junctional nevus     Mildly variegated pigmented, slightly irregular-bordered macule c/w mildly atypical nevus      Flesh colored to evenly pigmented papule c/w intradermal nevus       Pink pearly papule/plaque c/w basal cell carcinoma      Erythematous hyperkeratotic cursted plaque c/w SCC      Surgical scar with no sign of skin cancer recurrence      Open and closed comedones      Inflammatory papules and pustules      Verrucoid papule consistent consistent with wart     Erythematous eczematous patches and plaques     Dystrophic onycholytic nail with subungual debris c/w onychomycosis     Umbilicated papule    Erythematous-base heme-crusted tan verrucoid plaque consistent with inflamed seborrheic keratosis     Erythematous Silvery Scaling Plaque c/w Psoriasis     See annotation      Assessment / Plan:      Pathology Orders:     Normal Orders This Visit    Specimen to Pathology, Dermatology     Comments:    Number of  Specimens:->1  ------------------------->-------------------------  Spec 1 Procedure:->Biopsy  Spec 1 Clinical Impression:->contact vs psoriasis vs other  Spec 1 Source:->left leg    Questions:    Procedure Type: Dermatology and skin neoplasms    Number of Specimens: 1    ------------------------: -------------------------    Spec 1 Procedure: Biopsy    Spec 1 Clinical Impression: contact vs psoriasis vs other    Spec 1 Source: left leg    Clinical Information: scaling pink pruritic plaques        Intrinsic eczema  -     mometasone 0.1% (ELOCON) 0.1 % cream; aaa bid  Dispense: 50 g; Refill: 2  Stop dial and dove  Safe list products only   Continue triamcinolone for maintenance   Moisturizer from safe list qday    Seborrheic keratosis  Flank  These are benign inherited growths without a malignant potential. Reassurance given to patient. No treatment is necessary.     Disease of skin and subcutaneous tissue  -     Specimen to Pathology, Dermatology  -     mometasone 0.1% (ELOCON) 0.1 % cream; aaa bid  Dispense: 50 g; Refill: 2  Punch biopsy procedure note:  Punch biopsy performed after verbal consent obtained. Area marked and prepped with alcohol. Approximately 1cc of 1% lidocaine with epinephrine injected.4 mm disposable punch used to remove lesion. Hemostasis obtained and biopsy site closed with 1 - 2 nylon sutures. Wound care instructions reviewed with patient and handout given.               Follow up in about 2 weeks (around 7/15/2020) for suture removal and path results.

## 2020-07-01 NOTE — TELEPHONE ENCOUNTER
----- Message from Mini Méndez sent at 7/1/2020 12:17 PM CDT -----  Regarding: Rash all over  Pt called to request an appt for today for rashes all over her body. Pt can be reached at [phone]

## 2020-07-10 LAB
FINAL PATHOLOGIC DIAGNOSIS: NORMAL
GROSS: NORMAL

## 2020-07-15 ENCOUNTER — TELEPHONE (OUTPATIENT)
Dept: DERMATOLOGY | Facility: CLINIC | Age: 40
End: 2020-07-15

## 2020-10-27 NOTE — PROGRESS NOTES
Requested updates within Care Everywhere.  Patient's chart was reviewed for overdue GIO topics.  Immunizations reconciled.       Debridement Text (Will Only Render In Visit Note If You Select Debridement Option Under Who Performed The Pdt Field): Prior to application of the photodynamic medication the hyperkeratotic lesions were curetted to make them more amenable to therapy.

## 2020-11-27 DIAGNOSIS — I10 ESSENTIAL HYPERTENSION: ICD-10-CM

## 2021-01-28 ENCOUNTER — OFFICE VISIT (OUTPATIENT)
Dept: FAMILY MEDICINE | Facility: CLINIC | Age: 41
End: 2021-01-28
Payer: COMMERCIAL

## 2021-01-28 DIAGNOSIS — J01.90 ACUTE BACTERIAL SINUSITIS: Primary | ICD-10-CM

## 2021-01-28 DIAGNOSIS — R05.9 COUGH: ICD-10-CM

## 2021-01-28 DIAGNOSIS — B96.89 ACUTE BACTERIAL SINUSITIS: Primary | ICD-10-CM

## 2021-01-28 PROCEDURE — 99212 PR OFFICE/OUTPT VISIT, EST, LEVL II, 10-19 MIN: ICD-10-PCS | Mod: 95,,, | Performed by: NURSE PRACTITIONER

## 2021-01-28 PROCEDURE — 99212 OFFICE O/P EST SF 10 MIN: CPT | Mod: 95,,, | Performed by: NURSE PRACTITIONER

## 2021-01-28 RX ORDER — AMOXICILLIN 875 MG/1
875 TABLET, FILM COATED ORAL EVERY 12 HOURS
Qty: 14 TABLET | Refills: 0 | Status: SHIPPED | OUTPATIENT
Start: 2021-01-28 | End: 2021-02-04

## 2021-01-28 RX ORDER — ALBUTEROL SULFATE 90 UG/1
2 AEROSOL, METERED RESPIRATORY (INHALATION) EVERY 6 HOURS PRN
Qty: 18 G | Refills: 0 | Status: SHIPPED | OUTPATIENT
Start: 2021-01-28 | End: 2021-11-04

## 2021-02-14 ENCOUNTER — PATIENT MESSAGE (OUTPATIENT)
Dept: FAMILY MEDICINE | Facility: CLINIC | Age: 41
End: 2021-02-14

## 2021-02-17 RX ORDER — TOPIRAMATE 100 MG/1
100 TABLET, FILM COATED ORAL 2 TIMES DAILY
Qty: 60 TABLET | Refills: 0 | Status: SHIPPED | OUTPATIENT
Start: 2021-02-17 | End: 2021-09-15

## 2021-02-22 RX ORDER — TOPIRAMATE 100 MG/1
100 TABLET, FILM COATED ORAL 2 TIMES DAILY
Qty: 60 TABLET | Refills: 5 | Status: SHIPPED | OUTPATIENT
Start: 2021-02-22 | End: 2022-03-24

## 2021-02-22 RX ORDER — TOPIRAMATE 100 MG/1
100 TABLET, FILM COATED ORAL 2 TIMES DAILY
Qty: 60 TABLET | Refills: 5 | Status: SHIPPED | OUTPATIENT
Start: 2021-02-22 | End: 2021-09-15

## 2021-02-23 ENCOUNTER — PATIENT MESSAGE (OUTPATIENT)
Dept: ADMINISTRATIVE | Facility: HOSPITAL | Age: 41
End: 2021-02-23

## 2021-02-23 ENCOUNTER — PATIENT OUTREACH (OUTPATIENT)
Dept: ADMINISTRATIVE | Facility: HOSPITAL | Age: 41
End: 2021-02-23

## 2021-03-10 ENCOUNTER — OFFICE VISIT (OUTPATIENT)
Dept: FAMILY MEDICINE | Facility: CLINIC | Age: 41
End: 2021-03-10
Payer: COMMERCIAL

## 2021-03-10 VITALS
SYSTOLIC BLOOD PRESSURE: 118 MMHG | TEMPERATURE: 99 F | HEART RATE: 82 BPM | OXYGEN SATURATION: 99 % | DIASTOLIC BLOOD PRESSURE: 78 MMHG

## 2021-03-10 DIAGNOSIS — M06.9 RHEUMATOID ARTHRITIS, INVOLVING UNSPECIFIED SITE, UNSPECIFIED WHETHER RHEUMATOID FACTOR PRESENT: ICD-10-CM

## 2021-03-10 DIAGNOSIS — K21.9 GASTROESOPHAGEAL REFLUX DISEASE, UNSPECIFIED WHETHER ESOPHAGITIS PRESENT: ICD-10-CM

## 2021-03-10 DIAGNOSIS — Z00.00 ANNUAL PHYSICAL EXAM: ICD-10-CM

## 2021-03-10 DIAGNOSIS — G44.209 TENSION-TYPE HEADACHE, NOT INTRACTABLE, UNSPECIFIED CHRONICITY PATTERN: ICD-10-CM

## 2021-03-10 DIAGNOSIS — F31.9 BIPOLAR AFFECTIVE DISORDER, REMISSION STATUS UNSPECIFIED: ICD-10-CM

## 2021-03-10 DIAGNOSIS — Z12.31 SCREENING MAMMOGRAM, ENCOUNTER FOR: Primary | ICD-10-CM

## 2021-03-10 PROCEDURE — 3078F DIAST BP <80 MM HG: CPT | Mod: CPTII,S$GLB,, | Performed by: INTERNAL MEDICINE

## 2021-03-10 PROCEDURE — 1125F PR PAIN SEVERITY QUANTIFIED, PAIN PRESENT: ICD-10-PCS | Mod: S$GLB,,, | Performed by: INTERNAL MEDICINE

## 2021-03-10 PROCEDURE — 3074F SYST BP LT 130 MM HG: CPT | Mod: CPTII,S$GLB,, | Performed by: INTERNAL MEDICINE

## 2021-03-10 PROCEDURE — 1125F AMNT PAIN NOTED PAIN PRSNT: CPT | Mod: S$GLB,,, | Performed by: INTERNAL MEDICINE

## 2021-03-10 PROCEDURE — 3074F PR MOST RECENT SYSTOLIC BLOOD PRESSURE < 130 MM HG: ICD-10-PCS | Mod: CPTII,S$GLB,, | Performed by: INTERNAL MEDICINE

## 2021-03-10 PROCEDURE — 99396 PREV VISIT EST AGE 40-64: CPT | Mod: S$GLB,,, | Performed by: INTERNAL MEDICINE

## 2021-03-10 PROCEDURE — 99999 PR PBB SHADOW E&M-EST. PATIENT-LVL IV: ICD-10-PCS | Mod: PBBFAC,,, | Performed by: INTERNAL MEDICINE

## 2021-03-10 PROCEDURE — 3078F PR MOST RECENT DIASTOLIC BLOOD PRESSURE < 80 MM HG: ICD-10-PCS | Mod: CPTII,S$GLB,, | Performed by: INTERNAL MEDICINE

## 2021-03-10 PROCEDURE — 99396 PR PREVENTIVE VISIT,EST,40-64: ICD-10-PCS | Mod: S$GLB,,, | Performed by: INTERNAL MEDICINE

## 2021-03-10 PROCEDURE — 99999 PR PBB SHADOW E&M-EST. PATIENT-LVL IV: CPT | Mod: PBBFAC,,, | Performed by: INTERNAL MEDICINE

## 2021-03-10 RX ORDER — FLUTICASONE PROPIONATE 50 MCG
1 SPRAY, SUSPENSION (ML) NASAL DAILY
Qty: 16 G | Refills: 1 | Status: SHIPPED | OUTPATIENT
Start: 2021-03-10 | End: 2021-11-04

## 2021-03-10 RX ORDER — TOPIRAMATE 100 MG/1
100 TABLET, FILM COATED ORAL 2 TIMES DAILY
Qty: 60 TABLET | Refills: 11 | Status: SHIPPED | OUTPATIENT
Start: 2021-03-10 | End: 2021-09-15

## 2021-03-10 RX ORDER — IVERMECTIN 10 MG/G
CREAM TOPICAL
COMMUNITY
Start: 2020-12-28 | End: 2022-07-13

## 2021-03-10 RX ORDER — AZELAIC ACID 0.15 G/G
GEL TOPICAL
COMMUNITY
Start: 2020-12-28 | End: 2022-07-13

## 2021-03-11 ENCOUNTER — PATIENT OUTREACH (OUTPATIENT)
Dept: ADMINISTRATIVE | Facility: OTHER | Age: 41
End: 2021-03-11

## 2021-03-15 ENCOUNTER — OFFICE VISIT (OUTPATIENT)
Dept: FAMILY MEDICINE | Facility: CLINIC | Age: 41
End: 2021-03-15
Payer: COMMERCIAL

## 2021-03-15 ENCOUNTER — PATIENT MESSAGE (OUTPATIENT)
Dept: FAMILY MEDICINE | Facility: CLINIC | Age: 41
End: 2021-03-15

## 2021-03-15 DIAGNOSIS — M06.9 RHEUMATOID ARTHRITIS, INVOLVING UNSPECIFIED SITE, UNSPECIFIED WHETHER RHEUMATOID FACTOR PRESENT: ICD-10-CM

## 2021-03-15 DIAGNOSIS — M79.10 PAIN IN THE MUSCLES: Primary | ICD-10-CM

## 2021-03-15 PROCEDURE — 99213 OFFICE O/P EST LOW 20 MIN: CPT | Mod: 95,,, | Performed by: FAMILY MEDICINE

## 2021-03-15 PROCEDURE — 99213 PR OFFICE/OUTPT VISIT, EST, LEVL III, 20-29 MIN: ICD-10-PCS | Mod: 95,,, | Performed by: FAMILY MEDICINE

## 2021-04-06 ENCOUNTER — PATIENT MESSAGE (OUTPATIENT)
Dept: ADMINISTRATIVE | Facility: HOSPITAL | Age: 41
End: 2021-04-06

## 2021-06-08 LAB
CHOLEST SERPL-MCNC: 184 MG/DL
CHOLEST/HDLC SERPL: 4.3 (CALC)
HDLC SERPL-MCNC: 43 MG/DL
LDLC SERPL CALC-MCNC: 114 MG/DL (CALC)
NONHDLC SERPL-MCNC: 141 MG/DL (CALC)
TRIGL SERPL-MCNC: 152 MG/DL
TSH SERPL-ACNC: 0.41 MIU/L

## 2021-06-15 ENCOUNTER — HOSPITAL ENCOUNTER (OUTPATIENT)
Dept: RADIOLOGY | Facility: HOSPITAL | Age: 41
Discharge: HOME | End: 2021-06-15
Attending: INTERNAL MEDICINE
Payer: COMMERCIAL

## 2021-06-15 DIAGNOSIS — Z12.31 SCREENING MAMMOGRAM, ENCOUNTER FOR: ICD-10-CM

## 2021-06-15 PROCEDURE — 77063 MAMMO DIGITAL SCREENING BILAT WITH TOMO: ICD-10-PCS | Mod: 26,,, | Performed by: RADIOLOGY

## 2021-06-15 PROCEDURE — 77063 BREAST TOMOSYNTHESIS BI: CPT | Mod: 26,,, | Performed by: RADIOLOGY

## 2021-06-15 PROCEDURE — 77067 MAMMO DIGITAL SCREENING BILAT WITH TOMO: ICD-10-PCS | Mod: 26,,, | Performed by: RADIOLOGY

## 2021-06-15 PROCEDURE — 77067 SCR MAMMO BI INCL CAD: CPT | Mod: 26,,, | Performed by: RADIOLOGY

## 2021-06-15 PROCEDURE — 77067 SCR MAMMO BI INCL CAD: CPT | Mod: TC,PO

## 2021-08-14 ENCOUNTER — LAB VISIT (OUTPATIENT)
Dept: FAMILY MEDICINE | Facility: CLINIC | Age: 41
End: 2021-08-14
Payer: COMMERCIAL

## 2021-08-14 ENCOUNTER — TELEPHONE (OUTPATIENT)
Dept: FAMILY MEDICINE | Facility: CLINIC | Age: 41
End: 2021-08-14

## 2021-08-14 DIAGNOSIS — Z11.52 ENCOUNTER FOR SCREENING FOR COVID-19: Primary | ICD-10-CM

## 2021-08-14 DIAGNOSIS — R05.9 COUGH: ICD-10-CM

## 2021-08-14 DIAGNOSIS — R05.9 COUGH: Primary | ICD-10-CM

## 2021-08-14 PROCEDURE — U0005 INFEC AGEN DETEC AMPLI PROBE: HCPCS | Performed by: INTERNAL MEDICINE

## 2021-08-14 PROCEDURE — U0003 INFECTIOUS AGENT DETECTION BY NUCLEIC ACID (DNA OR RNA); SEVERE ACUTE RESPIRATORY SYNDROME CORONAVIRUS 2 (SARS-COV-2) (CORONAVIRUS DISEASE [COVID-19]), AMPLIFIED PROBE TECHNIQUE, MAKING USE OF HIGH THROUGHPUT TECHNOLOGIES AS DESCRIBED BY CMS-2020-01-R: HCPCS | Performed by: INTERNAL MEDICINE

## 2021-08-15 LAB
SARS-COV-2 RNA RESP QL NAA+PROBE: NOT DETECTED
SARS-COV-2- CYCLE NUMBER: -1

## 2021-08-19 ENCOUNTER — IMMUNIZATION (OUTPATIENT)
Dept: PHARMACY | Facility: CLINIC | Age: 41
End: 2021-08-19
Payer: COMMERCIAL

## 2021-08-19 DIAGNOSIS — Z23 NEED FOR VACCINATION: Primary | ICD-10-CM

## 2021-09-15 ENCOUNTER — OFFICE VISIT (OUTPATIENT)
Dept: OBSTETRICS AND GYNECOLOGY | Facility: CLINIC | Age: 41
End: 2021-09-15
Payer: COMMERCIAL

## 2021-09-15 ENCOUNTER — PATIENT OUTREACH (OUTPATIENT)
Dept: ADMINISTRATIVE | Facility: OTHER | Age: 41
End: 2021-09-15

## 2021-09-15 VITALS
SYSTOLIC BLOOD PRESSURE: 102 MMHG | DIASTOLIC BLOOD PRESSURE: 62 MMHG | BODY MASS INDEX: 32.27 KG/M2 | HEIGHT: 68 IN | WEIGHT: 212.94 LBS

## 2021-09-15 DIAGNOSIS — Z86.19 HISTORY OF STAPH INFECTION: ICD-10-CM

## 2021-09-15 DIAGNOSIS — N76.3 CHRONIC VULVITIS: Primary | ICD-10-CM

## 2021-09-15 DIAGNOSIS — D84.821 IMMUNOSUPPRESSION DUE TO DRUG THERAPY: ICD-10-CM

## 2021-09-15 DIAGNOSIS — M06.9 RHEUMATOID ARTHRITIS, INVOLVING UNSPECIFIED SITE, UNSPECIFIED WHETHER RHEUMATOID FACTOR PRESENT: ICD-10-CM

## 2021-09-15 DIAGNOSIS — F31.9 BIPOLAR AFFECTIVE DISORDER, REMISSION STATUS UNSPECIFIED: ICD-10-CM

## 2021-09-15 DIAGNOSIS — L30.9 ECZEMA, UNSPECIFIED TYPE: ICD-10-CM

## 2021-09-15 DIAGNOSIS — Z79.899 IMMUNOSUPPRESSION DUE TO DRUG THERAPY: ICD-10-CM

## 2021-09-15 PROCEDURE — 1159F MED LIST DOCD IN RCRD: CPT | Mod: CPTII,S$GLB,, | Performed by: OBSTETRICS & GYNECOLOGY

## 2021-09-15 PROCEDURE — 3078F PR MOST RECENT DIASTOLIC BLOOD PRESSURE < 80 MM HG: ICD-10-PCS | Mod: CPTII,S$GLB,, | Performed by: OBSTETRICS & GYNECOLOGY

## 2021-09-15 PROCEDURE — 99214 OFFICE O/P EST MOD 30 MIN: CPT | Mod: S$GLB,,, | Performed by: OBSTETRICS & GYNECOLOGY

## 2021-09-15 PROCEDURE — 3008F BODY MASS INDEX DOCD: CPT | Mod: CPTII,S$GLB,, | Performed by: OBSTETRICS & GYNECOLOGY

## 2021-09-15 PROCEDURE — 99999 PR PBB SHADOW E&M-EST. PATIENT-LVL III: CPT | Mod: PBBFAC,,, | Performed by: OBSTETRICS & GYNECOLOGY

## 2021-09-15 PROCEDURE — 3008F PR BODY MASS INDEX (BMI) DOCUMENTED: ICD-10-PCS | Mod: CPTII,S$GLB,, | Performed by: OBSTETRICS & GYNECOLOGY

## 2021-09-15 PROCEDURE — 1159F PR MEDICATION LIST DOCUMENTED IN MEDICAL RECORD: ICD-10-PCS | Mod: CPTII,S$GLB,, | Performed by: OBSTETRICS & GYNECOLOGY

## 2021-09-15 PROCEDURE — 3074F SYST BP LT 130 MM HG: CPT | Mod: CPTII,S$GLB,, | Performed by: OBSTETRICS & GYNECOLOGY

## 2021-09-15 PROCEDURE — 99214 PR OFFICE/OUTPT VISIT, EST, LEVL IV, 30-39 MIN: ICD-10-PCS | Mod: S$GLB,,, | Performed by: OBSTETRICS & GYNECOLOGY

## 2021-09-15 PROCEDURE — 99999 PR PBB SHADOW E&M-EST. PATIENT-LVL III: ICD-10-PCS | Mod: PBBFAC,,, | Performed by: OBSTETRICS & GYNECOLOGY

## 2021-09-15 PROCEDURE — 3078F DIAST BP <80 MM HG: CPT | Mod: CPTII,S$GLB,, | Performed by: OBSTETRICS & GYNECOLOGY

## 2021-09-15 PROCEDURE — 3074F PR MOST RECENT SYSTOLIC BLOOD PRESSURE < 130 MM HG: ICD-10-PCS | Mod: CPTII,S$GLB,, | Performed by: OBSTETRICS & GYNECOLOGY

## 2021-09-15 PROCEDURE — 87481 CANDIDA DNA AMP PROBE: CPT | Mod: 59 | Performed by: OBSTETRICS & GYNECOLOGY

## 2021-09-15 RX ORDER — CLOBETASOL PROPIONATE 0.5 MG/G
CREAM TOPICAL NIGHTLY
Qty: 60 G | Refills: 1 | Status: SHIPPED | OUTPATIENT
Start: 2021-09-15 | End: 2021-12-03 | Stop reason: SDUPTHER

## 2021-10-04 ENCOUNTER — PATIENT MESSAGE (OUTPATIENT)
Dept: FAMILY MEDICINE | Facility: CLINIC | Age: 41
End: 2021-10-04

## 2021-10-04 ENCOUNTER — PATIENT MESSAGE (OUTPATIENT)
Dept: OBSTETRICS AND GYNECOLOGY | Facility: CLINIC | Age: 41
End: 2021-10-04

## 2021-10-04 ENCOUNTER — TELEPHONE (OUTPATIENT)
Dept: OBSTETRICS AND GYNECOLOGY | Facility: CLINIC | Age: 41
End: 2021-10-04

## 2021-10-04 LAB
BACTERIAL VAGINOSIS DNA: NEGATIVE
CANDIDA GLABRATA DNA: NORMAL
CANDIDA KRUSEI DNA: NORMAL
CANDIDA RRNA VAG QL PROBE: NORMAL
T VAGINALIS RRNA GENITAL QL PROBE: NORMAL

## 2021-10-05 ENCOUNTER — TELEPHONE (OUTPATIENT)
Dept: OBSTETRICS AND GYNECOLOGY | Facility: CLINIC | Age: 41
End: 2021-10-05

## 2021-10-07 ENCOUNTER — OFFICE VISIT (OUTPATIENT)
Dept: FAMILY MEDICINE | Facility: CLINIC | Age: 41
End: 2021-10-07
Payer: COMMERCIAL

## 2021-10-07 VITALS
SYSTOLIC BLOOD PRESSURE: 117 MMHG | DIASTOLIC BLOOD PRESSURE: 78 MMHG | WEIGHT: 211 LBS | OXYGEN SATURATION: 98 % | BODY MASS INDEX: 32.08 KG/M2 | HEART RATE: 87 BPM

## 2021-10-07 DIAGNOSIS — M06.9 RHEUMATOID ARTHRITIS, INVOLVING UNSPECIFIED SITE, UNSPECIFIED WHETHER RHEUMATOID FACTOR PRESENT: ICD-10-CM

## 2021-10-07 DIAGNOSIS — K58.9 IRRITABLE BOWEL SYNDROME, UNSPECIFIED TYPE: ICD-10-CM

## 2021-10-07 DIAGNOSIS — K59.00 CONSTIPATION, UNSPECIFIED CONSTIPATION TYPE: Primary | ICD-10-CM

## 2021-10-07 DIAGNOSIS — F41.1 GENERALIZED ANXIETY DISORDER: ICD-10-CM

## 2021-10-07 PROCEDURE — 3078F DIAST BP <80 MM HG: CPT | Mod: CPTII,S$GLB,, | Performed by: INTERNAL MEDICINE

## 2021-10-07 PROCEDURE — 99999 PR PBB SHADOW E&M-EST. PATIENT-LVL IV: ICD-10-PCS | Mod: PBBFAC,,, | Performed by: INTERNAL MEDICINE

## 2021-10-07 PROCEDURE — 1159F PR MEDICATION LIST DOCUMENTED IN MEDICAL RECORD: ICD-10-PCS | Mod: CPTII,S$GLB,, | Performed by: INTERNAL MEDICINE

## 2021-10-07 PROCEDURE — 3008F BODY MASS INDEX DOCD: CPT | Mod: CPTII,S$GLB,, | Performed by: INTERNAL MEDICINE

## 2021-10-07 PROCEDURE — 99214 PR OFFICE/OUTPT VISIT, EST, LEVL IV, 30-39 MIN: ICD-10-PCS | Mod: S$GLB,,, | Performed by: INTERNAL MEDICINE

## 2021-10-07 PROCEDURE — 3008F PR BODY MASS INDEX (BMI) DOCUMENTED: ICD-10-PCS | Mod: CPTII,S$GLB,, | Performed by: INTERNAL MEDICINE

## 2021-10-07 PROCEDURE — 3074F PR MOST RECENT SYSTOLIC BLOOD PRESSURE < 130 MM HG: ICD-10-PCS | Mod: CPTII,S$GLB,, | Performed by: INTERNAL MEDICINE

## 2021-10-07 PROCEDURE — 99214 OFFICE O/P EST MOD 30 MIN: CPT | Mod: S$GLB,,, | Performed by: INTERNAL MEDICINE

## 2021-10-07 PROCEDURE — 99999 PR PBB SHADOW E&M-EST. PATIENT-LVL IV: CPT | Mod: PBBFAC,,, | Performed by: INTERNAL MEDICINE

## 2021-10-07 PROCEDURE — 1159F MED LIST DOCD IN RCRD: CPT | Mod: CPTII,S$GLB,, | Performed by: INTERNAL MEDICINE

## 2021-10-07 PROCEDURE — 3074F SYST BP LT 130 MM HG: CPT | Mod: CPTII,S$GLB,, | Performed by: INTERNAL MEDICINE

## 2021-10-07 PROCEDURE — 3078F PR MOST RECENT DIASTOLIC BLOOD PRESSURE < 80 MM HG: ICD-10-PCS | Mod: CPTII,S$GLB,, | Performed by: INTERNAL MEDICINE

## 2021-10-07 RX ORDER — PANTOPRAZOLE SODIUM 40 MG/1
TABLET, DELAYED RELEASE ORAL
Qty: 30 TABLET | Refills: 5 | Status: SHIPPED | OUTPATIENT
Start: 2021-10-07 | End: 2022-04-28

## 2021-11-04 ENCOUNTER — OFFICE VISIT (OUTPATIENT)
Dept: FAMILY MEDICINE | Facility: CLINIC | Age: 41
End: 2021-11-04
Payer: COMMERCIAL

## 2021-11-04 ENCOUNTER — HOSPITAL ENCOUNTER (OUTPATIENT)
Dept: RADIOLOGY | Facility: HOSPITAL | Age: 41
Discharge: HOME | End: 2021-11-04
Attending: FAMILY MEDICINE
Payer: COMMERCIAL

## 2021-11-04 VITALS
BODY MASS INDEX: 32.05 KG/M2 | HEART RATE: 65 BPM | SYSTOLIC BLOOD PRESSURE: 122 MMHG | OXYGEN SATURATION: 98 % | WEIGHT: 210.75 LBS | TEMPERATURE: 98 F | DIASTOLIC BLOOD PRESSURE: 80 MMHG

## 2021-11-04 DIAGNOSIS — J45.21 MILD INTERMITTENT ASTHMA WITH ACUTE EXACERBATION: ICD-10-CM

## 2021-11-04 DIAGNOSIS — J45.909 BRONCHITIS WITH ASTHMA, ACUTE: Primary | ICD-10-CM

## 2021-11-04 DIAGNOSIS — J20.9 BRONCHITIS WITH ASTHMA, ACUTE: Primary | ICD-10-CM

## 2021-11-04 DIAGNOSIS — R05.9 COUGH: ICD-10-CM

## 2021-11-04 PROCEDURE — 3079F PR MOST RECENT DIASTOLIC BLOOD PRESSURE 80-89 MM HG: ICD-10-PCS | Mod: CPTII,S$GLB,, | Performed by: FAMILY MEDICINE

## 2021-11-04 PROCEDURE — 3079F DIAST BP 80-89 MM HG: CPT | Mod: CPTII,S$GLB,, | Performed by: FAMILY MEDICINE

## 2021-11-04 PROCEDURE — 1160F PR REVIEW ALL MEDS BY PRESCRIBER/CLIN PHARMACIST DOCUMENTED: ICD-10-PCS | Mod: CPTII,S$GLB,, | Performed by: FAMILY MEDICINE

## 2021-11-04 PROCEDURE — 3008F BODY MASS INDEX DOCD: CPT | Mod: CPTII,S$GLB,, | Performed by: FAMILY MEDICINE

## 2021-11-04 PROCEDURE — 99999 PR PBB SHADOW E&M-EST. PATIENT-LVL IV: ICD-10-PCS | Mod: PBBFAC,,, | Performed by: FAMILY MEDICINE

## 2021-11-04 PROCEDURE — 99214 OFFICE O/P EST MOD 30 MIN: CPT | Mod: S$GLB,,, | Performed by: FAMILY MEDICINE

## 2021-11-04 PROCEDURE — 71046 X-RAY EXAM CHEST 2 VIEWS: CPT | Mod: TC,FY,PO

## 2021-11-04 PROCEDURE — 71046 X-RAY EXAM CHEST 2 VIEWS: CPT | Mod: 26,,, | Performed by: RADIOLOGY

## 2021-11-04 PROCEDURE — 71046 XR CHEST PA AND LATERAL: ICD-10-PCS | Mod: 26,,, | Performed by: RADIOLOGY

## 2021-11-04 PROCEDURE — 3074F PR MOST RECENT SYSTOLIC BLOOD PRESSURE < 130 MM HG: ICD-10-PCS | Mod: CPTII,S$GLB,, | Performed by: FAMILY MEDICINE

## 2021-11-04 PROCEDURE — 1159F MED LIST DOCD IN RCRD: CPT | Mod: CPTII,S$GLB,, | Performed by: FAMILY MEDICINE

## 2021-11-04 PROCEDURE — 1159F PR MEDICATION LIST DOCUMENTED IN MEDICAL RECORD: ICD-10-PCS | Mod: CPTII,S$GLB,, | Performed by: FAMILY MEDICINE

## 2021-11-04 PROCEDURE — 1160F RVW MEDS BY RX/DR IN RCRD: CPT | Mod: CPTII,S$GLB,, | Performed by: FAMILY MEDICINE

## 2021-11-04 PROCEDURE — 3008F PR BODY MASS INDEX (BMI) DOCUMENTED: ICD-10-PCS | Mod: CPTII,S$GLB,, | Performed by: FAMILY MEDICINE

## 2021-11-04 PROCEDURE — 99214 PR OFFICE/OUTPT VISIT, EST, LEVL IV, 30-39 MIN: ICD-10-PCS | Mod: S$GLB,,, | Performed by: FAMILY MEDICINE

## 2021-11-04 PROCEDURE — 3074F SYST BP LT 130 MM HG: CPT | Mod: CPTII,S$GLB,, | Performed by: FAMILY MEDICINE

## 2021-11-04 PROCEDURE — 99999 PR PBB SHADOW E&M-EST. PATIENT-LVL IV: CPT | Mod: PBBFAC,,, | Performed by: FAMILY MEDICINE

## 2021-11-04 RX ORDER — ALBUTEROL SULFATE 90 UG/1
2 AEROSOL, METERED RESPIRATORY (INHALATION) EVERY 4 HOURS PRN
Qty: 8 G | Refills: 2 | Status: SHIPPED | OUTPATIENT
Start: 2021-11-04 | End: 2022-03-16 | Stop reason: SDUPTHER

## 2021-11-04 RX ORDER — BUDESONIDE AND FORMOTEROL FUMARATE DIHYDRATE 80; 4.5 UG/1; UG/1
2 AEROSOL RESPIRATORY (INHALATION) DAILY
Qty: 6.9 G | Refills: 2 | Status: SHIPPED | OUTPATIENT
Start: 2021-11-04 | End: 2022-03-16 | Stop reason: SDUPTHER

## 2021-11-04 RX ORDER — PROMETHAZINE HYDROCHLORIDE AND DEXTROMETHORPHAN HYDROBROMIDE 6.25; 15 MG/5ML; MG/5ML
5 SYRUP ORAL EVERY 6 HOURS PRN
Qty: 120 ML | Refills: 0 | Status: SHIPPED | OUTPATIENT
Start: 2021-11-04 | End: 2021-11-14

## 2021-11-04 RX ORDER — VENLAFAXINE HYDROCHLORIDE 150 MG/1
150 CAPSULE, EXTENDED RELEASE ORAL DAILY
COMMUNITY
End: 2023-12-04

## 2021-11-04 RX ORDER — DOXYCYCLINE 100 MG/1
100 CAPSULE ORAL 2 TIMES DAILY
Qty: 14 CAPSULE | Refills: 0 | Status: SHIPPED | OUTPATIENT
Start: 2021-11-04 | End: 2021-11-11

## 2021-11-10 ENCOUNTER — PATIENT MESSAGE (OUTPATIENT)
Dept: FAMILY MEDICINE | Facility: CLINIC | Age: 41
End: 2021-11-10
Payer: COMMERCIAL

## 2021-11-20 ENCOUNTER — PATIENT OUTREACH (OUTPATIENT)
Dept: ADMINISTRATIVE | Facility: OTHER | Age: 41
End: 2021-11-20
Payer: COMMERCIAL

## 2021-11-22 ENCOUNTER — IMMUNIZATION (OUTPATIENT)
Dept: FAMILY MEDICINE | Facility: CLINIC | Age: 41
End: 2021-11-22
Payer: COMMERCIAL

## 2021-11-22 PROCEDURE — 90686 FLU VACCINE (QUAD) GREATER THAN OR EQUAL TO 3YO PRESERVATIVE FREE IM: ICD-10-PCS | Mod: S$GLB,,, | Performed by: INTERNAL MEDICINE

## 2021-11-22 PROCEDURE — 90471 FLU VACCINE (QUAD) GREATER THAN OR EQUAL TO 3YO PRESERVATIVE FREE IM: ICD-10-PCS | Mod: S$GLB,,, | Performed by: INTERNAL MEDICINE

## 2021-11-22 PROCEDURE — 90686 IIV4 VACC NO PRSV 0.5 ML IM: CPT | Mod: S$GLB,,, | Performed by: INTERNAL MEDICINE

## 2021-11-22 PROCEDURE — 90471 IMMUNIZATION ADMIN: CPT | Mod: S$GLB,,, | Performed by: INTERNAL MEDICINE

## 2021-12-03 ENCOUNTER — OFFICE VISIT (OUTPATIENT)
Dept: OBSTETRICS AND GYNECOLOGY | Facility: CLINIC | Age: 41
End: 2021-12-03
Payer: COMMERCIAL

## 2021-12-03 VITALS
DIASTOLIC BLOOD PRESSURE: 68 MMHG | SYSTOLIC BLOOD PRESSURE: 108 MMHG | HEIGHT: 68 IN | WEIGHT: 211.44 LBS | BODY MASS INDEX: 32.05 KG/M2

## 2021-12-03 DIAGNOSIS — Z79.899 IMMUNOSUPPRESSION DUE TO DRUG THERAPY: ICD-10-CM

## 2021-12-03 DIAGNOSIS — N76.3 CHRONIC VULVITIS: Primary | ICD-10-CM

## 2021-12-03 DIAGNOSIS — D84.821 IMMUNOSUPPRESSION DUE TO DRUG THERAPY: ICD-10-CM

## 2021-12-03 DIAGNOSIS — M79.7 FIBROMYALGIA: ICD-10-CM

## 2021-12-03 DIAGNOSIS — M06.9 RHEUMATOID ARTHRITIS, INVOLVING UNSPECIFIED SITE, UNSPECIFIED WHETHER RHEUMATOID FACTOR PRESENT: ICD-10-CM

## 2021-12-03 DIAGNOSIS — F31.9 BIPOLAR AFFECTIVE DISORDER, REMISSION STATUS UNSPECIFIED: ICD-10-CM

## 2021-12-03 DIAGNOSIS — L30.9 ECZEMA, UNSPECIFIED TYPE: ICD-10-CM

## 2021-12-03 DIAGNOSIS — Z86.19 HISTORY OF STAPH INFECTION: ICD-10-CM

## 2021-12-03 PROCEDURE — 99213 PR OFFICE/OUTPT VISIT, EST, LEVL III, 20-29 MIN: ICD-10-PCS | Mod: S$GLB,,, | Performed by: OBSTETRICS & GYNECOLOGY

## 2021-12-03 PROCEDURE — 99213 OFFICE O/P EST LOW 20 MIN: CPT | Mod: S$GLB,,, | Performed by: OBSTETRICS & GYNECOLOGY

## 2021-12-03 PROCEDURE — 99999 PR PBB SHADOW E&M-EST. PATIENT-LVL III: CPT | Mod: PBBFAC,,, | Performed by: OBSTETRICS & GYNECOLOGY

## 2021-12-03 PROCEDURE — 99999 PR PBB SHADOW E&M-EST. PATIENT-LVL III: ICD-10-PCS | Mod: PBBFAC,,, | Performed by: OBSTETRICS & GYNECOLOGY

## 2021-12-03 PROCEDURE — 87481 CANDIDA DNA AMP PROBE: CPT | Mod: 59 | Performed by: OBSTETRICS & GYNECOLOGY

## 2021-12-03 RX ORDER — CLOBETASOL PROPIONATE 0.5 MG/G
CREAM TOPICAL NIGHTLY
Qty: 60 G | Refills: 1 | Status: SHIPPED | OUTPATIENT
Start: 2021-12-03 | End: 2022-01-26

## 2021-12-08 ENCOUNTER — PATIENT MESSAGE (OUTPATIENT)
Dept: OBSTETRICS AND GYNECOLOGY | Facility: CLINIC | Age: 41
End: 2021-12-08
Payer: COMMERCIAL

## 2021-12-08 LAB
BACTERIAL VAGINOSIS DNA: NEGATIVE
CANDIDA GLABRATA DNA: POSITIVE
CANDIDA KRUSEI DNA: NEGATIVE
CANDIDA RRNA VAG QL PROBE: NEGATIVE
T VAGINALIS RRNA GENITAL QL PROBE: NEGATIVE

## 2021-12-08 RX ORDER — FLUCONAZOLE 150 MG/1
150 TABLET ORAL
Qty: 3 TABLET | Refills: 0 | Status: SHIPPED | OUTPATIENT
Start: 2021-12-08 | End: 2022-01-26

## 2022-01-26 ENCOUNTER — OFFICE VISIT (OUTPATIENT)
Dept: FAMILY MEDICINE | Facility: CLINIC | Age: 42
End: 2022-01-26
Payer: COMMERCIAL

## 2022-01-26 DIAGNOSIS — K21.9 GASTROESOPHAGEAL REFLUX DISEASE, UNSPECIFIED WHETHER ESOPHAGITIS PRESENT: ICD-10-CM

## 2022-01-26 DIAGNOSIS — K58.9 IRRITABLE BOWEL SYNDROME, UNSPECIFIED TYPE: ICD-10-CM

## 2022-01-26 DIAGNOSIS — M79.7 FIBROMYALGIA: ICD-10-CM

## 2022-01-26 DIAGNOSIS — F31.9 BIPOLAR AFFECTIVE DISORDER, REMISSION STATUS UNSPECIFIED: ICD-10-CM

## 2022-01-26 DIAGNOSIS — U07.1 COVID-19: Primary | ICD-10-CM

## 2022-01-26 DIAGNOSIS — F41.1 GENERALIZED ANXIETY DISORDER: ICD-10-CM

## 2022-01-26 DIAGNOSIS — M06.9 RHEUMATOID ARTHRITIS, INVOLVING UNSPECIFIED SITE, UNSPECIFIED WHETHER RHEUMATOID FACTOR PRESENT: ICD-10-CM

## 2022-01-26 PROCEDURE — 1160F RVW MEDS BY RX/DR IN RCRD: CPT | Mod: CPTII,95,, | Performed by: PHYSICIAN ASSISTANT

## 2022-01-26 PROCEDURE — 99214 PR OFFICE/OUTPT VISIT, EST, LEVL IV, 30-39 MIN: ICD-10-PCS | Mod: 95,,, | Performed by: PHYSICIAN ASSISTANT

## 2022-01-26 PROCEDURE — 1159F MED LIST DOCD IN RCRD: CPT | Mod: CPTII,95,, | Performed by: PHYSICIAN ASSISTANT

## 2022-01-26 PROCEDURE — 99214 OFFICE O/P EST MOD 30 MIN: CPT | Mod: 95,,, | Performed by: PHYSICIAN ASSISTANT

## 2022-01-26 PROCEDURE — 1160F PR REVIEW ALL MEDS BY PRESCRIBER/CLIN PHARMACIST DOCUMENTED: ICD-10-PCS | Mod: CPTII,95,, | Performed by: PHYSICIAN ASSISTANT

## 2022-01-26 PROCEDURE — 1159F PR MEDICATION LIST DOCUMENTED IN MEDICAL RECORD: ICD-10-PCS | Mod: CPTII,95,, | Performed by: PHYSICIAN ASSISTANT

## 2022-01-26 NOTE — PATIENT INSTRUCTIONS
A few reminders from today:    Home care strategies and care:    -Over the counter Vitamins C 1000 mg, Vitamin D 2000 units (not to exceed 5,000 units per day) and Zinc 50 mg daily by mouth.   -Acetaminophen (Tylenol) and Ibuprofen (Motrin) for management of body aches and fever/chills. Taken as directed on the bottle, these medications can be put in rotation with one another, if needed, to provide better control of symptoms.   - Guaifenesin (Mucinex) for chest congestion. It is essential that you ensure GOOD HYDRATION when taking this medication. Stop using this if you are no longer coughing up mucous or phlegm. Could also try claritin or zyrtec.   -NAUSEA can be controlled with over the counter Emetrol, use as directed. Good hydration is also tied to the ability to control nausea.   -GOOD NUTRITION raw fruits, vegetables, soups, and lean meats.   -HYDRATION is VERY important. Water has the highest value, but low sugar Gatorade or Poweraide can also be used in moderation to help maintain hydration. Avoid caffeine or sugary drinks.     ED for Fever > 102 not resolving with medication, significant shortness of breath or chest pain, Oxygen level less than 93%, or other worsening symptoms.        Re-testing for 'clearance' of the virus is not recommended. We use a symptom or time-based method to determine when someone is no longer infectious and can stop isolation:    If you have symptoms and tested positive:  More than 5 days since symptoms first appeared AND  More than 24 hours fever free without medications AND        symptoms have improved    For five days after ending isolation, masks are required.      If you had no symptoms but tested positive:  More than 5 days since the date of the first positive test. If you develop symptoms, then use the guidelines above  For five days after ending isolation, masks are required.      For additional information, please reference our [facility] at  [email or web address]    Remain vigilant on prevention strategies:   Wash your hands frequently.   Consider masking indoors.   Socially distance from others.   Cough or sneeze into your elbow or a tissue.   Stay home if you experience symptoms.   Avoid crowded spaces.      Please let us know if there is more we can do to help you.

## 2022-01-26 NOTE — PROGRESS NOTES
Subjective:       Patient ID: Pura Gudino is a 41 y.o. female.    The patient location is: [place], [place]  The chief complaint leading to consultation is: nasal congestion, cough    Visit type: audiovisual    Face to Face time with patient: 20  minutes of total time spent on the encounter, which includes face to face time and non-face to face time preparing to see the patient (eg, review of tests), Obtaining and/or reviewing separately obtained history, Documenting clinical information in the electronic or other health record, Independently interpreting results (not separately reported) and communicating results to the patient/family/caregiver, or Care coordination (not separately reported).         Each patient to whom he or she provides medical services by telemedicine is:  (1) informed of the relationship between the physician and patient and the respective role of any other health care provider with respect to management of the patient; and (2) notified that he or she may decline to receive medical services by telemedicine and may withdraw from such care at any time.    Chief Complaint: cough, nasal congestion       HPI     Pt is new to me, PCP Dr. Garrido.     Pt is a 41 year old female with bipolar disorder, MATT, IBS, GERD, fibromyalgia, RA. She presents today complaining of nasal congestion, cough, sore throat, headache, and fatigue. Fever has been running around 100.4. Pt has had 3 doses of the moderna covid vaccine. Her son tested positive for covid on Monday, as well as her mom. Her symptoms began Friday.     Review of Systems   Constitutional: Positive for chills and fever.   HENT: Positive for nasal congestion, ear pain, postnasal drip, sinus pressure/congestion and sore throat. Negative for drooling, ear discharge and trouble swallowing.    Respiratory: Positive for cough. Negative for chest tightness, shortness of breath and stridor.    Cardiovascular: Negative for chest pain and palpitations.    Gastrointestinal: Negative for abdominal pain, change in bowel habit, constipation, diarrhea, nausea, vomiting and change in bowel habit.   Genitourinary: Negative for difficulty urinating, dysuria, frequency, hematuria and urgency.   Musculoskeletal: Positive for myalgias and neck pain.   Integumentary:  Negative for rash.   Neurological: Positive for headaches. Negative for dizziness, vertigo, tremors, speech difficulty, weakness, light-headedness and numbness.   Psychiatric/Behavioral: Negative for decreased concentration, dysphoric mood and sleep disturbance. The patient is not nervous/anxious.          Objective:      Physical Exam  Constitutional:       General: She is not in acute distress.     Appearance: Normal appearance. She is not ill-appearing, toxic-appearing or diaphoretic.   HENT:      Head: Normocephalic and atraumatic.   Pulmonary:      Effort: No respiratory distress.   Neurological:      General: No focal deficit present.      Mental Status: She is alert and oriented to person, place, and time.   Psychiatric:         Mood and Affect: Mood normal.         Behavior: Behavior normal.         Thought Content: Thought content normal.         Judgment: Judgment normal.       Full exam was not able to be performed today, as this was a virtual visit.     Assessment:       Problem List Items Addressed This Visit        Psychiatric    Bipolar disorder    Generalized anxiety disorder       GI    IBS (irritable bowel syndrome)    GERD (gastroesophageal reflux disease)       Orthopedic    Fibromyalgia    Rheumatoid arthritis      Other Visit Diagnoses     COVID-19    -  Primary          Plan:         1. COVID-19  -symptomatic care discussed  -instructed pt to notify her rheumatologist, as they may want to hold enbrel this week    2. Generalized anxiety disorder      3. Bipolar affective disorder, remission status unspecified    4. Irritable bowel syndrome, unspecified type    5. Gastroesophageal reflux  disease, unspecified whether esophagitis present    6. Fibromyalgia    7. Rheumatoid arthritis, involving unspecified site, unspecified whether rheumatoid factor present      RTC/ER precautions given. F/U if symptoms persist or worsen

## 2022-02-17 ENCOUNTER — PATIENT MESSAGE (OUTPATIENT)
Dept: FAMILY MEDICINE | Facility: CLINIC | Age: 42
End: 2022-02-17

## 2022-02-23 DIAGNOSIS — D84.9 IMMUNOSUPPRESSED STATUS: ICD-10-CM

## 2022-02-28 ENCOUNTER — LAB VISIT (OUTPATIENT)
Dept: LAB | Facility: HOSPITAL | Age: 42
End: 2022-02-28
Attending: OPHTHALMOLOGY
Payer: COMMERCIAL

## 2022-02-28 ENCOUNTER — OFFICE VISIT (OUTPATIENT)
Dept: FAMILY MEDICINE | Facility: CLINIC | Age: 42
End: 2022-02-28
Payer: COMMERCIAL

## 2022-02-28 VITALS — SYSTOLIC BLOOD PRESSURE: 118 MMHG | OXYGEN SATURATION: 98 % | DIASTOLIC BLOOD PRESSURE: 78 MMHG | HEART RATE: 86 BPM

## 2022-02-28 DIAGNOSIS — Z86.16 HISTORY OF COVID-19: ICD-10-CM

## 2022-02-28 DIAGNOSIS — R09.82 POSTNASAL DRIP: ICD-10-CM

## 2022-02-28 DIAGNOSIS — Z86.16 HISTORY OF COVID-19: Primary | ICD-10-CM

## 2022-02-28 PROCEDURE — 85025 COMPLETE CBC W/AUTO DIFF WBC: CPT | Performed by: PHYSICIAN ASSISTANT

## 2022-02-28 PROCEDURE — 80053 COMPREHEN METABOLIC PANEL: CPT | Performed by: PHYSICIAN ASSISTANT

## 2022-02-28 PROCEDURE — 3074F SYST BP LT 130 MM HG: CPT | Mod: CPTII,S$GLB,, | Performed by: PHYSICIAN ASSISTANT

## 2022-02-28 PROCEDURE — 99214 PR OFFICE/OUTPT VISIT, EST, LEVL IV, 30-39 MIN: ICD-10-PCS | Mod: S$GLB,,, | Performed by: PHYSICIAN ASSISTANT

## 2022-02-28 PROCEDURE — 36415 COLL VENOUS BLD VENIPUNCTURE: CPT | Mod: PO | Performed by: PHYSICIAN ASSISTANT

## 2022-02-28 PROCEDURE — 1159F MED LIST DOCD IN RCRD: CPT | Mod: CPTII,S$GLB,, | Performed by: PHYSICIAN ASSISTANT

## 2022-02-28 PROCEDURE — 99214 OFFICE O/P EST MOD 30 MIN: CPT | Mod: S$GLB,,, | Performed by: PHYSICIAN ASSISTANT

## 2022-02-28 PROCEDURE — 1159F PR MEDICATION LIST DOCUMENTED IN MEDICAL RECORD: ICD-10-PCS | Mod: CPTII,S$GLB,, | Performed by: PHYSICIAN ASSISTANT

## 2022-02-28 PROCEDURE — 3078F PR MOST RECENT DIASTOLIC BLOOD PRESSURE < 80 MM HG: ICD-10-PCS | Mod: CPTII,S$GLB,, | Performed by: PHYSICIAN ASSISTANT

## 2022-02-28 PROCEDURE — 3074F PR MOST RECENT SYSTOLIC BLOOD PRESSURE < 130 MM HG: ICD-10-PCS | Mod: CPTII,S$GLB,, | Performed by: PHYSICIAN ASSISTANT

## 2022-02-28 PROCEDURE — 99999 PR PBB SHADOW E&M-EST. PATIENT-LVL III: ICD-10-PCS | Mod: PBBFAC,,, | Performed by: PHYSICIAN ASSISTANT

## 2022-02-28 PROCEDURE — 84443 ASSAY THYROID STIM HORMONE: CPT | Performed by: PHYSICIAN ASSISTANT

## 2022-02-28 PROCEDURE — 3078F DIAST BP <80 MM HG: CPT | Mod: CPTII,S$GLB,, | Performed by: PHYSICIAN ASSISTANT

## 2022-02-28 PROCEDURE — 1160F PR REVIEW ALL MEDS BY PRESCRIBER/CLIN PHARMACIST DOCUMENTED: ICD-10-PCS | Mod: CPTII,S$GLB,, | Performed by: PHYSICIAN ASSISTANT

## 2022-02-28 PROCEDURE — 1160F RVW MEDS BY RX/DR IN RCRD: CPT | Mod: CPTII,S$GLB,, | Performed by: PHYSICIAN ASSISTANT

## 2022-02-28 PROCEDURE — 99999 PR PBB SHADOW E&M-EST. PATIENT-LVL III: CPT | Mod: PBBFAC,,, | Performed by: PHYSICIAN ASSISTANT

## 2022-02-28 RX ORDER — LEVOCETIRIZINE DIHYDROCHLORIDE 5 MG/1
5 TABLET, FILM COATED ORAL NIGHTLY
Qty: 30 TABLET | Refills: 11 | Status: SHIPPED | OUTPATIENT
Start: 2022-02-28 | End: 2022-03-16 | Stop reason: CLARIF

## 2022-02-28 RX ORDER — ALPRAZOLAM 0.5 MG/1
0.5 TABLET ORAL DAILY PRN
COMMUNITY
Start: 2022-01-18 | End: 2022-09-22

## 2022-02-28 RX ORDER — FLUTICASONE PROPIONATE 50 MCG
2 SPRAY, SUSPENSION (ML) NASAL DAILY
Qty: 18.2 ML | Refills: 0 | Status: SHIPPED | OUTPATIENT
Start: 2022-02-28 | End: 2022-02-28

## 2022-02-28 RX ORDER — EMOLLIENT COMBINATION NO.32
EMULSION, EXTENDED RELEASE TOPICAL 2 TIMES DAILY
COMMUNITY
Start: 2021-11-08 | End: 2023-12-04

## 2022-02-28 RX ORDER — BENZONATATE 100 MG/1
100 CAPSULE ORAL 3 TIMES DAILY PRN
Qty: 30 CAPSULE | Refills: 0 | Status: SHIPPED | OUTPATIENT
Start: 2022-02-28 | End: 2022-03-10

## 2022-02-28 NOTE — PATIENT INSTRUCTIONS
A few reminders from today:    Start xyzal at night, flonase in the morning.   Tessalon as needed  Albuterol as needed  Try menthol cough drops, humidifier  Continue with daily multivitamin  Labs today    Follow up with me if needed.   Please go to ER/urgent care if after hours or symptoms persist/worsen.     Do not hesitate to get in touch with me should you have any further questions.     Thank you for trusting me with your care!  I wish you health and happiness.    -Mary Zayas PA-C

## 2022-02-28 NOTE — PROGRESS NOTES
Subjective:       Patient ID: Pura Gudino is a 42 y.o. female.    Chief Complaint: Sore Throat and Cough (3-4 weeks)    HPI     Pt is new to me, PCP Dr. Garrido.     Pt is a 42 year old female with BP disorder, PCOS, IBS, GERD, fibromyalgia. She presents today complaining of a sore throat and cough. Pt did test positive for covid at the end of January. She feels like she got completely better, but then she started with postnasal drip and scratchy throat again and it has gotten gradually worse. Now, she also has a dry cough. Lastly, pt states that ever since covid, she has noticed her hair falling out more easily. Does admit to increased stress.     Review of Systems   Constitutional: Negative for chills and fever.   HENT: Positive for postnasal drip and sore throat. Negative for ear pain, rhinorrhea and sinus pressure/congestion.    Respiratory: Positive for cough and wheezing. Negative for choking, chest tightness and shortness of breath.    Cardiovascular: Negative for chest pain and palpitations.   Gastrointestinal: Negative for abdominal pain, change in bowel habit, constipation, diarrhea, nausea, vomiting, reflux and change in bowel habit.   Musculoskeletal: Positive for myalgias.   Integumentary:  Negative for rash.   Allergic/Immunologic: Positive for environmental allergies.   Neurological: Positive for headaches.   Psychiatric/Behavioral: Negative for sleep disturbance. The patient is not nervous/anxious.          Objective:      Physical Exam  Vitals and nursing note reviewed.   Constitutional:       General: She is not in acute distress.     Appearance: Normal appearance. She is not ill-appearing, toxic-appearing or diaphoretic.   HENT:      Head: Normocephalic and atraumatic.      Right Ear: Tympanic membrane, ear canal and external ear normal. There is no impacted cerumen.      Left Ear: Tympanic membrane, ear canal and external ear normal. There is no impacted cerumen.      Nose: Congestion  and rhinorrhea present.      Right Sinus: Maxillary sinus tenderness (mild) present.      Mouth/Throat:      Mouth: Mucous membranes are moist.      Pharynx: Oropharynx is clear. Posterior oropharyngeal erythema present. No oropharyngeal exudate.   Eyes:      General: No scleral icterus.        Right eye: No discharge.         Left eye: No discharge.      Extraocular Movements: Extraocular movements intact.      Conjunctiva/sclera: Conjunctivae normal.      Pupils: Pupils are equal, round, and reactive to light.   Cardiovascular:      Rate and Rhythm: Normal rate and regular rhythm.      Pulses: Normal pulses.      Heart sounds: Normal heart sounds. No murmur heard.    No friction rub. No gallop.   Pulmonary:      Effort: Pulmonary effort is normal. No respiratory distress.      Breath sounds: Normal breath sounds. No stridor. No wheezing, rhonchi or rales.   Abdominal:      General: Abdomen is flat. Bowel sounds are normal. There is no distension.      Palpations: Abdomen is soft. There is no mass.      Tenderness: There is no abdominal tenderness. There is no right CVA tenderness, left CVA tenderness, guarding or rebound.   Musculoskeletal:         General: No deformity or signs of injury.      Cervical back: Normal range of motion and neck supple.      Right lower leg: No edema.      Left lower leg: No edema.   Lymphadenopathy:      Cervical: No cervical adenopathy.   Skin:     General: Skin is warm.      Capillary Refill: Capillary refill takes less than 2 seconds.      Coloration: Skin is not jaundiced or pale.      Findings: No lesion or rash.   Neurological:      General: No focal deficit present.      Mental Status: She is alert and oriented to person, place, and time. Mental status is at baseline.   Psychiatric:         Mood and Affect: Mood normal.         Behavior: Behavior normal.         Thought Content: Thought content normal.         Judgment: Judgment normal.         Assessment:       Problem List  Items Addressed This Visit    None     Visit Diagnoses     History of COVID-19    -  Primary    Relevant Medications    levocetirizine (XYZAL) 5 MG tablet    benzonatate (TESSALON) 100 MG capsule    Other Relevant Orders    TSH    Comprehensive Metabolic Panel    CBC Auto Differential    Postnasal drip              Plan:       1. History of COVID-19  - levocetirizine (XYZAL) 5 MG tablet; Take 1 tablet (5 mg total) by mouth every evening.  Dispense: 30 tablet; Refill: 11  - benzonatate (TESSALON) 100 MG capsule; Take 1 capsule (100 mg total) by mouth 3 (three) times daily as needed for Cough.  Dispense: 30 capsule; Refill: 0  - TSH; Future  - Comprehensive Metabolic Panel; Future  - CBC Auto Differential; Future    2. Postnasal drip  -see above    RTC/ER precautions given. F/U If symptoms persist or worsen. Symptoms of bacterial sinusitis discussed. Pt will notify me if present.

## 2022-03-01 LAB
ALBUMIN SERPL BCP-MCNC: 3.6 G/DL (ref 3.5–5.2)
ALP SERPL-CCNC: 111 U/L (ref 55–135)
ALT SERPL W/O P-5'-P-CCNC: 16 U/L (ref 10–44)
ANION GAP SERPL CALC-SCNC: 10 MMOL/L (ref 8–16)
AST SERPL-CCNC: 19 U/L (ref 10–40)
BASOPHILS # BLD AUTO: 0.1 K/UL (ref 0–0.2)
BASOPHILS NFR BLD: 0.9 % (ref 0–1.9)
BILIRUB SERPL-MCNC: 0.4 MG/DL (ref 0.1–1)
BUN SERPL-MCNC: 13 MG/DL (ref 6–20)
CALCIUM SERPL-MCNC: 9.5 MG/DL (ref 8.7–10.5)
CHLORIDE SERPL-SCNC: 108 MMOL/L (ref 95–110)
CO2 SERPL-SCNC: 20 MMOL/L (ref 23–29)
CREAT SERPL-MCNC: 0.8 MG/DL (ref 0.5–1.4)
DIFFERENTIAL METHOD: ABNORMAL
EOSINOPHIL # BLD AUTO: 0.5 K/UL (ref 0–0.5)
EOSINOPHIL NFR BLD: 4.1 % (ref 0–8)
ERYTHROCYTE [DISTWIDTH] IN BLOOD BY AUTOMATED COUNT: 14.5 % (ref 11.5–14.5)
EST. GFR  (AFRICAN AMERICAN): >60 ML/MIN/1.73 M^2
EST. GFR  (NON AFRICAN AMERICAN): >60 ML/MIN/1.73 M^2
GLUCOSE SERPL-MCNC: 77 MG/DL (ref 70–110)
HCT VFR BLD AUTO: 40.8 % (ref 37–48.5)
HGB BLD-MCNC: 12.6 G/DL (ref 12–16)
IMM GRANULOCYTES # BLD AUTO: 0.04 K/UL (ref 0–0.04)
IMM GRANULOCYTES NFR BLD AUTO: 0.4 % (ref 0–0.5)
LYMPHOCYTES # BLD AUTO: 2.9 K/UL (ref 1–4.8)
LYMPHOCYTES NFR BLD: 25.4 % (ref 18–48)
MCH RBC QN AUTO: 27.3 PG (ref 27–31)
MCHC RBC AUTO-ENTMCNC: 30.9 G/DL (ref 32–36)
MCV RBC AUTO: 88 FL (ref 82–98)
MONOCYTES # BLD AUTO: 0.8 K/UL (ref 0.3–1)
MONOCYTES NFR BLD: 7.5 % (ref 4–15)
NEUTROPHILS # BLD AUTO: 6.9 K/UL (ref 1.8–7.7)
NEUTROPHILS NFR BLD: 61.7 % (ref 38–73)
NRBC BLD-RTO: 0 /100 WBC
PLATELET # BLD AUTO: 298 K/UL (ref 150–450)
PMV BLD AUTO: 12.8 FL (ref 9.2–12.9)
POTASSIUM SERPL-SCNC: 3.7 MMOL/L (ref 3.5–5.1)
PROT SERPL-MCNC: 7.8 G/DL (ref 6–8.4)
RBC # BLD AUTO: 4.62 M/UL (ref 4–5.4)
SODIUM SERPL-SCNC: 138 MMOL/L (ref 136–145)
TSH SERPL DL<=0.005 MIU/L-ACNC: 0.61 UIU/ML (ref 0.4–4)
WBC # BLD AUTO: 11.21 K/UL (ref 3.9–12.7)

## 2022-03-16 ENCOUNTER — OFFICE VISIT (OUTPATIENT)
Dept: FAMILY MEDICINE | Facility: CLINIC | Age: 42
End: 2022-03-16
Payer: COMMERCIAL

## 2022-03-16 VITALS
DIASTOLIC BLOOD PRESSURE: 84 MMHG | SYSTOLIC BLOOD PRESSURE: 138 MMHG | HEIGHT: 68 IN | WEIGHT: 215.38 LBS | BODY MASS INDEX: 32.64 KG/M2 | HEART RATE: 69 BPM | OXYGEN SATURATION: 98 %

## 2022-03-16 DIAGNOSIS — J45.909 BRONCHITIS WITH ASTHMA, ACUTE: Primary | ICD-10-CM

## 2022-03-16 DIAGNOSIS — G43.009 MIGRAINE WITHOUT AURA AND WITHOUT STATUS MIGRAINOSUS, NOT INTRACTABLE: ICD-10-CM

## 2022-03-16 DIAGNOSIS — L30.9 DERMATITIS: ICD-10-CM

## 2022-03-16 DIAGNOSIS — J45.21 MILD INTERMITTENT ASTHMA WITH ACUTE EXACERBATION: ICD-10-CM

## 2022-03-16 DIAGNOSIS — J20.9 BRONCHITIS WITH ASTHMA, ACUTE: Primary | ICD-10-CM

## 2022-03-16 PROCEDURE — 1159F PR MEDICATION LIST DOCUMENTED IN MEDICAL RECORD: ICD-10-PCS | Mod: CPTII,S$GLB,, | Performed by: FAMILY MEDICINE

## 2022-03-16 PROCEDURE — 3008F BODY MASS INDEX DOCD: CPT | Mod: CPTII,S$GLB,, | Performed by: FAMILY MEDICINE

## 2022-03-16 PROCEDURE — 99999 PR PBB SHADOW E&M-EST. PATIENT-LVL IV: CPT | Mod: PBBFAC,,, | Performed by: FAMILY MEDICINE

## 2022-03-16 PROCEDURE — 1160F PR REVIEW ALL MEDS BY PRESCRIBER/CLIN PHARMACIST DOCUMENTED: ICD-10-PCS | Mod: CPTII,S$GLB,, | Performed by: FAMILY MEDICINE

## 2022-03-16 PROCEDURE — 3079F PR MOST RECENT DIASTOLIC BLOOD PRESSURE 80-89 MM HG: ICD-10-PCS | Mod: CPTII,S$GLB,, | Performed by: FAMILY MEDICINE

## 2022-03-16 PROCEDURE — 3008F PR BODY MASS INDEX (BMI) DOCUMENTED: ICD-10-PCS | Mod: CPTII,S$GLB,, | Performed by: FAMILY MEDICINE

## 2022-03-16 PROCEDURE — 99214 OFFICE O/P EST MOD 30 MIN: CPT | Mod: S$GLB,,, | Performed by: FAMILY MEDICINE

## 2022-03-16 PROCEDURE — 1159F MED LIST DOCD IN RCRD: CPT | Mod: CPTII,S$GLB,, | Performed by: FAMILY MEDICINE

## 2022-03-16 PROCEDURE — 3075F PR MOST RECENT SYSTOLIC BLOOD PRESS GE 130-139MM HG: ICD-10-PCS | Mod: CPTII,S$GLB,, | Performed by: FAMILY MEDICINE

## 2022-03-16 PROCEDURE — 99999 PR PBB SHADOW E&M-EST. PATIENT-LVL IV: ICD-10-PCS | Mod: PBBFAC,,, | Performed by: FAMILY MEDICINE

## 2022-03-16 PROCEDURE — 99214 PR OFFICE/OUTPT VISIT, EST, LEVL IV, 30-39 MIN: ICD-10-PCS | Mod: S$GLB,,, | Performed by: FAMILY MEDICINE

## 2022-03-16 PROCEDURE — 3075F SYST BP GE 130 - 139MM HG: CPT | Mod: CPTII,S$GLB,, | Performed by: FAMILY MEDICINE

## 2022-03-16 PROCEDURE — 3079F DIAST BP 80-89 MM HG: CPT | Mod: CPTII,S$GLB,, | Performed by: FAMILY MEDICINE

## 2022-03-16 PROCEDURE — 1160F RVW MEDS BY RX/DR IN RCRD: CPT | Mod: CPTII,S$GLB,, | Performed by: FAMILY MEDICINE

## 2022-03-16 RX ORDER — CLOBETASOL PROPIONATE 0.46 MG/ML
SOLUTION TOPICAL 2 TIMES DAILY
Qty: 50 ML | Refills: 1 | Status: SHIPPED | OUTPATIENT
Start: 2022-03-16 | End: 2023-12-04

## 2022-03-16 RX ORDER — ALBUTEROL SULFATE 90 UG/1
2 AEROSOL, METERED RESPIRATORY (INHALATION) EVERY 4 HOURS PRN
Qty: 8 G | Refills: 2 | Status: SHIPPED | OUTPATIENT
Start: 2022-03-16

## 2022-03-16 RX ORDER — PROMETHAZINE HYDROCHLORIDE AND DEXTROMETHORPHAN HYDROBROMIDE 6.25; 15 MG/5ML; MG/5ML
5 SYRUP ORAL EVERY 8 HOURS PRN
Qty: 180 ML | Refills: 0 | Status: SHIPPED | OUTPATIENT
Start: 2022-03-16 | End: 2022-07-13

## 2022-03-16 RX ORDER — FLUTICASONE PROPIONATE AND SALMETEROL 250; 50 UG/1; UG/1
1 POWDER RESPIRATORY (INHALATION) 2 TIMES DAILY
Qty: 60 EACH | Refills: 11 | Status: SHIPPED | OUTPATIENT
Start: 2022-03-16 | End: 2022-07-13 | Stop reason: SDUPTHER
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
## 2022-04-06 ENCOUNTER — PATIENT MESSAGE (OUTPATIENT)
Dept: FAMILY MEDICINE | Facility: CLINIC | Age: 42
End: 2022-04-06
Payer: COMMERCIAL

## 2022-04-11 ENCOUNTER — TELEPHONE (OUTPATIENT)
Dept: NEUROLOGY | Facility: CLINIC | Age: 42
End: 2022-04-11
Payer: COMMERCIAL

## 2022-04-11 NOTE — TELEPHONE ENCOUNTER
----- Message from Sayda Leonard sent at 4/11/2022  9:20 AM CDT -----  Contact: ADRIÁN MEDEL [[id number]]  Type: Appointment Request    Name of Caller: ADRIÁN MEDEL [[id number]]  When is the first available appointment? Do not have access  Reason for Visit:  Ongoing headaches  Contact preference: [employer] message  Additional Information:  Has a referral from Dr. Emilie Long. Prefers virtual with an MD in [place] if possible.

## 2022-04-14 ENCOUNTER — PATIENT MESSAGE (OUTPATIENT)
Dept: FAMILY MEDICINE | Facility: CLINIC | Age: 42
End: 2022-04-14

## 2022-04-14 ENCOUNTER — OFFICE VISIT (OUTPATIENT)
Dept: FAMILY MEDICINE | Facility: CLINIC | Age: 42
End: 2022-04-14
Payer: COMMERCIAL

## 2022-04-14 VITALS
SYSTOLIC BLOOD PRESSURE: 120 MMHG | OXYGEN SATURATION: 100 % | WEIGHT: 209.69 LBS | DIASTOLIC BLOOD PRESSURE: 78 MMHG | BODY MASS INDEX: 31.88 KG/M2 | TEMPERATURE: 98 F | HEART RATE: 76 BPM

## 2022-04-14 DIAGNOSIS — G43.009 MIGRAINE WITHOUT AURA AND WITHOUT STATUS MIGRAINOSUS, NOT INTRACTABLE: ICD-10-CM

## 2022-04-14 DIAGNOSIS — J20.9 BRONCHITIS WITH ASTHMA, ACUTE: Primary | ICD-10-CM

## 2022-04-14 DIAGNOSIS — J45.909 BRONCHITIS WITH ASTHMA, ACUTE: Primary | ICD-10-CM

## 2022-04-14 PROCEDURE — 1159F MED LIST DOCD IN RCRD: CPT | Mod: CPTII,S$GLB,, | Performed by: INTERNAL MEDICINE

## 2022-04-14 PROCEDURE — 99999 PR PBB SHADOW E&M-EST. PATIENT-LVL IV: CPT | Mod: PBBFAC,,, | Performed by: INTERNAL MEDICINE

## 2022-04-14 PROCEDURE — 99999 PR PBB SHADOW E&M-EST. PATIENT-LVL IV: ICD-10-PCS | Mod: PBBFAC,,, | Performed by: INTERNAL MEDICINE

## 2022-04-14 PROCEDURE — 3008F PR BODY MASS INDEX (BMI) DOCUMENTED: ICD-10-PCS | Mod: CPTII,S$GLB,, | Performed by: INTERNAL MEDICINE

## 2022-04-14 PROCEDURE — 3078F DIAST BP <80 MM HG: CPT | Mod: CPTII,S$GLB,, | Performed by: INTERNAL MEDICINE

## 2022-04-14 PROCEDURE — 3008F BODY MASS INDEX DOCD: CPT | Mod: CPTII,S$GLB,, | Performed by: INTERNAL MEDICINE

## 2022-04-14 PROCEDURE — 3074F PR MOST RECENT SYSTOLIC BLOOD PRESSURE < 130 MM HG: ICD-10-PCS | Mod: CPTII,S$GLB,, | Performed by: INTERNAL MEDICINE

## 2022-04-14 PROCEDURE — 99213 PR OFFICE/OUTPT VISIT, EST, LEVL III, 20-29 MIN: ICD-10-PCS | Mod: S$GLB,,, | Performed by: INTERNAL MEDICINE

## 2022-04-14 PROCEDURE — 99213 OFFICE O/P EST LOW 20 MIN: CPT | Mod: S$GLB,,, | Performed by: INTERNAL MEDICINE

## 2022-04-14 PROCEDURE — 1159F PR MEDICATION LIST DOCUMENTED IN MEDICAL RECORD: ICD-10-PCS | Mod: CPTII,S$GLB,, | Performed by: INTERNAL MEDICINE

## 2022-04-14 PROCEDURE — 3078F PR MOST RECENT DIASTOLIC BLOOD PRESSURE < 80 MM HG: ICD-10-PCS | Mod: CPTII,S$GLB,, | Performed by: INTERNAL MEDICINE

## 2022-04-14 PROCEDURE — 3074F SYST BP LT 130 MM HG: CPT | Mod: CPTII,S$GLB,, | Performed by: INTERNAL MEDICINE

## 2022-04-14 RX ORDER — PREDNISONE 20 MG/1
20 TABLET ORAL DAILY
Qty: 12 TABLET | Refills: 0 | Status: SHIPPED | OUTPATIENT
Start: 2022-04-14 | End: 2022-07-13

## 2022-04-14 RX ORDER — AZITHROMYCIN 250 MG/1
TABLET, FILM COATED ORAL
Qty: 6 TABLET | Refills: 0 | Status: SHIPPED | OUTPATIENT
Start: 2022-04-14 | End: 2022-04-19

## 2022-04-14 NOTE — PROGRESS NOTES
Subjective:       Patient ID: Pura Gudino is a 42 y.o. female.    Chief Complaint: Cough and Nasal Congestion    Pt here today for continued cough, sore throat, congestion for last month. She is on advair and labuterol without much relief. She has a dry cough,. She has green mucus. She has wheezing.      Review of Systems   Constitutional: Negative for fever.   Respiratory: Negative for chest tightness and shortness of breath.    Cardiovascular: Negative for chest pain and palpitations.   Gastrointestinal: Negative for abdominal pain.   Neurological: Negative for dizziness and headaches.         Objective:      Physical Exam  Constitutional:       Appearance: Normal appearance.   HENT:      Head: Normocephalic.   Cardiovascular:      Rate and Rhythm: Normal rate and regular rhythm.   Pulmonary:      Effort: Pulmonary effort is normal.      Breath sounds: Wheezing present.   Musculoskeletal:         General: Normal range of motion.      Cervical back: Normal range of motion.   Neurological:      General: No focal deficit present.      Mental Status: She is alert.   Psychiatric:         Mood and Affect: Mood normal.         Behavior: Behavior normal.         Assessment:       Problem List Items Addressed This Visit    None     Visit Diagnoses     Bronchitis with asthma, acute    -  Primary    Migraine without aura and without status migrainosus, not intractable              Plan:       Bronchitis- zpack, prednisone taper.

## 2022-04-20 ENCOUNTER — PATIENT MESSAGE (OUTPATIENT)
Dept: FAMILY MEDICINE | Facility: CLINIC | Age: 42
End: 2022-04-20
Payer: COMMERCIAL

## 2022-04-28 RX ORDER — PANTOPRAZOLE SODIUM 40 MG/1
TABLET, DELAYED RELEASE ORAL
Qty: 90 TABLET | Refills: 3 | Status: SHIPPED | OUTPATIENT
Start: 2022-04-28 | End: 2023-04-04

## 2022-04-28 NOTE — TELEPHONE ENCOUNTER
Refill Authorization Note   Pura Gudino  is requesting a refill authorization.  Brief Assessment and Rationale for Refill:  Approve     Medication Therapy Plan:       Medication Reconciliation Completed: No   Comments:     No Care Gaps recommended.     Note composed:3:36 PM 04/28/2022

## 2022-04-28 NOTE — TELEPHONE ENCOUNTER
No new care gaps identified.  Powered by [employer] Reference number: [id number].   4/28/2022 12:03:36 PM CDT

## 2022-06-10 ENCOUNTER — OFFICE VISIT (OUTPATIENT)
Dept: NEUROLOGY | Facility: CLINIC | Age: 42
End: 2022-06-10
Payer: COMMERCIAL

## 2022-06-10 ENCOUNTER — LAB VISIT (OUTPATIENT)
Dept: LAB | Facility: HOSPITAL | Age: 42
End: 2022-06-10
Attending: PSYCHIATRY & NEUROLOGY
Payer: COMMERCIAL

## 2022-06-10 VITALS
SYSTOLIC BLOOD PRESSURE: 112 MMHG | BODY MASS INDEX: 32.81 KG/M2 | RESPIRATION RATE: 17 BRPM | WEIGHT: 216.5 LBS | DIASTOLIC BLOOD PRESSURE: 71 MMHG | HEIGHT: 68 IN | HEART RATE: 76 BPM

## 2022-06-10 DIAGNOSIS — G43.019 INTRACTABLE MIGRAINE WITHOUT AURA AND WITHOUT STATUS MIGRAINOSUS: Primary | ICD-10-CM

## 2022-06-10 DIAGNOSIS — K58.1 IRRITABLE BOWEL SYNDROME WITH CONSTIPATION: ICD-10-CM

## 2022-06-10 DIAGNOSIS — R51.9 LEFT FACIAL PAIN: ICD-10-CM

## 2022-06-10 DIAGNOSIS — M06.9 RHEUMATOID ARTHRITIS, INVOLVING UNSPECIFIED SITE, UNSPECIFIED WHETHER RHEUMATOID FACTOR PRESENT: ICD-10-CM

## 2022-06-10 DIAGNOSIS — R51.9 WORSENING HEADACHES: ICD-10-CM

## 2022-06-10 LAB
ANION GAP SERPL CALC-SCNC: 8 MMOL/L (ref 8–16)
BUN SERPL-MCNC: 9 MG/DL (ref 6–20)
CALCIUM SERPL-MCNC: 9.5 MG/DL (ref 8.7–10.5)
CHLORIDE SERPL-SCNC: 113 MMOL/L (ref 95–110)
CO2 SERPL-SCNC: 17 MMOL/L (ref 23–29)
CREAT SERPL-MCNC: 0.8 MG/DL (ref 0.5–1.4)
EST. GFR  (AFRICAN AMERICAN): >60 ML/MIN/1.73 M^2
EST. GFR  (NON AFRICAN AMERICAN): >60 ML/MIN/1.73 M^2
GLUCOSE SERPL-MCNC: 90 MG/DL (ref 70–110)
POTASSIUM SERPL-SCNC: 4.8 MMOL/L (ref 3.5–5.1)
SODIUM SERPL-SCNC: 138 MMOL/L (ref 136–145)

## 2022-06-10 PROCEDURE — 36415 COLL VENOUS BLD VENIPUNCTURE: CPT | Mod: PO | Performed by: PSYCHIATRY & NEUROLOGY

## 2022-06-10 PROCEDURE — 1159F PR MEDICATION LIST DOCUMENTED IN MEDICAL RECORD: ICD-10-PCS | Mod: CPTII,S$GLB,, | Performed by: PSYCHIATRY & NEUROLOGY

## 2022-06-10 PROCEDURE — 99205 OFFICE O/P NEW HI 60 MIN: CPT | Mod: S$GLB,,, | Performed by: PSYCHIATRY & NEUROLOGY

## 2022-06-10 PROCEDURE — 3008F BODY MASS INDEX DOCD: CPT | Mod: CPTII,S$GLB,, | Performed by: PSYCHIATRY & NEUROLOGY

## 2022-06-10 PROCEDURE — 80048 BASIC METABOLIC PNL TOTAL CA: CPT | Performed by: PSYCHIATRY & NEUROLOGY

## 2022-06-10 PROCEDURE — 3078F PR MOST RECENT DIASTOLIC BLOOD PRESSURE < 80 MM HG: ICD-10-PCS | Mod: CPTII,S$GLB,, | Performed by: PSYCHIATRY & NEUROLOGY

## 2022-06-10 PROCEDURE — 3008F PR BODY MASS INDEX (BMI) DOCUMENTED: ICD-10-PCS | Mod: CPTII,S$GLB,, | Performed by: PSYCHIATRY & NEUROLOGY

## 2022-06-10 PROCEDURE — 3078F DIAST BP <80 MM HG: CPT | Mod: CPTII,S$GLB,, | Performed by: PSYCHIATRY & NEUROLOGY

## 2022-06-10 PROCEDURE — 99999 PR PBB SHADOW E&M-EST. PATIENT-LVL V: CPT | Mod: PBBFAC,,, | Performed by: PSYCHIATRY & NEUROLOGY

## 2022-06-10 PROCEDURE — 99205 PR OFFICE/OUTPT VISIT, NEW, LEVL V, 60-74 MIN: ICD-10-PCS | Mod: S$GLB,,, | Performed by: PSYCHIATRY & NEUROLOGY

## 2022-06-10 PROCEDURE — 3074F SYST BP LT 130 MM HG: CPT | Mod: CPTII,S$GLB,, | Performed by: PSYCHIATRY & NEUROLOGY

## 2022-06-10 PROCEDURE — 1159F MED LIST DOCD IN RCRD: CPT | Mod: CPTII,S$GLB,, | Performed by: PSYCHIATRY & NEUROLOGY

## 2022-06-10 PROCEDURE — 3074F PR MOST RECENT SYSTOLIC BLOOD PRESSURE < 130 MM HG: ICD-10-PCS | Mod: CPTII,S$GLB,, | Performed by: PSYCHIATRY & NEUROLOGY

## 2022-06-10 PROCEDURE — 99999 PR PBB SHADOW E&M-EST. PATIENT-LVL V: ICD-10-PCS | Mod: PBBFAC,,, | Performed by: PSYCHIATRY & NEUROLOGY

## 2022-06-10 PROCEDURE — 1160F RVW MEDS BY RX/DR IN RCRD: CPT | Mod: CPTII,S$GLB,, | Performed by: PSYCHIATRY & NEUROLOGY

## 2022-06-10 PROCEDURE — 1160F PR REVIEW ALL MEDS BY PRESCRIBER/CLIN PHARMACIST DOCUMENTED: ICD-10-PCS | Mod: CPTII,S$GLB,, | Performed by: PSYCHIATRY & NEUROLOGY

## 2022-06-10 RX ORDER — CLONAZEPAM 0.5 MG/1
0.5 TABLET ORAL DAILY PRN
COMMUNITY
Start: 2022-05-25 | End: 2023-12-04

## 2022-06-10 RX ORDER — FREMANEZUMAB-VFRM 225 MG/1.5ML
225 INJECTION SUBCUTANEOUS
Qty: 1.5 ML | Refills: 11 | Status: SHIPPED | OUTPATIENT
Start: 2022-06-10 | End: 2023-06-07 | Stop reason: SDUPTHER

## 2022-06-10 RX ORDER — UBROGEPANT 50 MG/1
50 TABLET ORAL ONCE AS NEEDED
Qty: 10 TABLET | Refills: 6 | Status: SHIPPED | OUTPATIENT
Start: 2022-06-10 | End: 2022-06-25

## 2022-06-10 RX ORDER — ELETRIPTAN HYDROBROMIDE 40 MG/1
40 TABLET, FILM COATED ORAL
Qty: 12 TABLET | Refills: 6 | Status: SHIPPED | OUTPATIENT
Start: 2022-06-10 | End: 2022-09-22

## 2022-06-10 NOTE — PROGRESS NOTES
"[facility]    Chief complaint: headache  Referred by: ELVIA Long MD  [address] [place] [address]     History of Present Illness:    42Y RH F with fibromyalgia,  IBS -C, PCOS, RA, childhood seizures, GERD, anxiety , depression, asthma who presents for initial evaluation of headache. She is here with her  and both contribute to history.      Headache history  Age at onset and course over time: began having headache around 2009 typically they were everyday, she was very motion sensitive, car rides made her dizzy/nauseated in 2010 started on  mg bid and she had maybe 1 time every 6 months a migraine level headache. She would turn off all the lights, heavy pillow on head, pressure on face, would take clonazepam to get calm, they were lasting a few hours and would resolve. She was having "sinus headaches" which were 1 time every 3 months. They are mainly pressure in the cheek bones, mild pain, was able to be functional and no migraine symoptms resolved quickly.     She had COVID 19 in 1/22 had body aches, fever, sore throat, coughing, fatigue, headache which lasted for about 1 week, then she had the flu type A about a month after that. She reports that since then she has been having multiple sinus infections, lots of drainage, she has had some sinus headaches and will take Aleve 2-3 times a week for those sinus headache.  The sinus headaches she is having now are mainly frontal maxillary, they will stay mild, post nasal drip, if does not treat with Aleve would intensify and worsen her activities and feels like eyebrows are being pushed. These last for about a few hours the aleve improves it, but will come back in the evening. They don't resolve all the day, but better.     Migraine level pain: every 1-2 weeks or so , starts with the feeling of heat in the left eye crescent shape around the left eye , starts faint and gets stronger that can last up to 2 " days or so. She will also have the light/sound/smell sensitivity, nausea, the pain described as pulsing heat/sharp pain. She will then take the rizatriptan 10mg which   These attacks last about 1-2 days or so. Denies any side effects. Typically rizatriptan 10mg about 3-4 hours. She never does a second dose. Since the worsening she has not had any head imaging. 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 She mentions that when they first started did not start with heat crescent first and then from there.    Location: frontal, around left eye and then temporal and will become variable location  Character: tight band, pressure, heat, sharp, pulsing   Intensity:  1-9/10 , current 1/10   Frequency: 3 times a week milder attack, 1-2 days a week severe   Duration: milder attacks 1 day, migraine 1-2 days   Aura:  Denies   Associated symptoms: photophobia, phonophobia, osmophobia, kinesiophobia, neck tightness/pain, nausea/vomiting  Other neurologic symptoms: nasal congestion, neck tightness/pain, difficulty concentration, problems with task completion, dizziness, OS blurry vision  Precipitating factors:  Stress, physical trigger to the Left V1 distribution like injury or waxing of eye brow   Alleviating factors:  darkness,  local pressure, medications  Aggravating factors: stress  ER/UC visits: 0   Caffeine:  2 cups coffee/work days   Sleep: reports good   GYN: s/p hysterectomy     Medication history:  Acute:  Rizatriptan 10mg - not fully effective   Sumatriptan 100mg- does not work   Naproxen- no effect on headache  APAP - no effect     Preventive:   mg bid - felt it worked very well   Venlafaxine XR 150mg - current, no improvement in headache   Propranolol LA 80mg - current, no  benefit in the headache   wellbutrin- no effect on headache     MIDAS: 34    Neuroimaging and other studies:   Results for orders placed or performed during the hospital encounter of 09/14/19   MRI Brain Without Contrast    Narrative    EXAMINATION:  MRI BRAIN WITHOUT CONTRAST    CLINICAL HISTORY:  Headache, chronic, new features or neuro deficit; Other headache syndrome    TECHNIQUE:  Multiplanar multisequence MR imaging of the brain was performed without contrast.    COMPARISON:  None.    FINDINGS:  Normal size ventricles.  No acute infarct or hemorrhage. No mass mass effect or midline shift. Globes and orbital contents are unremarkable. Paranasal sinuses and mastoid air cells are clear. Normal vascular flow voids.      Impression    No acute intracranial abnormality.      Electronically signed by: Satish Rocha  Date:    09/14/2019  Time:    18:11   Results for orders placed or performed during the hospital encounter of 12/14/12   MRI Brain W WO Contrast    Narrative    MRI OF THE BRAIN WITH ANDWITHOUT CONTRAST:     Technique: Multiple and multi-sequence MR imaging of the brain was performed before and after the intravenous administration of 10 cc Gadavist.  The examination was acquired according to IAC protocol.    Comparison: None.    Findings: The semicircular canals, vestibule, and cochlea show normal morphology and fluid signal bilaterally. The seventh/eighth nerve complexes are normal in caliber and signal characteristics.  There is no abnormal enhancement.  There is no mass   within the middle ear, internal auditory canal, or cerebellopontine angle bilaterally.  The mastoid air cells are clear.    The brain pregnant has normal signal characteristics.  Ventricles are normal in size and configuration.  There is no restricted effusion.  No abnormal enhancement.  The pituitary, brainstem, and cerebellum are normal.  The paranasal sinuses are clear.    The orbits are unremarkable.    Impression         Normal  MRI examination of the brain and internal auditory canals.  ______________________________________     Electronically signed by resident: Anthony Maria MD  Date:     12/14/12  Time:    13:04          As the supervising and teaching physician, I personally reviewed the images and resident's interpretation and I agree with the findings.          Electronically signed by: NICOLÁS PAPPAS MD  Date:     12/15/12  Time:    15:48      ROS: The fourteen point review of system was performed.   Constitutional:  +fatigue   Eyes:                        Denies diplopia, ptosis, visual field defects or ocular disease   excepting any listed above.  ENT:   Denies hearing loss, infection, carcinoma or other diseases excepting any listed above.   Cardiovascular:  +palpitations.  Pulmonary:  +cough   Gastrointestinal: Denies ulcer disease, liver or other disease excepting any listed above.  Skin:   Denies rash, skin cancer, or other cutaneous disorder excepting any listed above.  Neurological:  See HPI  Musculoskeletal: Denies RA, fractures or other joint or skeletal problems excepting any listed above.  Heme/Lymphatic: Denies any blood or lymph system neoplasm or disorder excepting any listed above.  Endocrine:  Denies any thyroid or other disorders, excepting any listed above.  Allergic/Immuno: Denies any autoimmune disease or allergy excepting any listed above.  Psychiatric:  Denies any disorder or treatment excepting any listed above.  Urologic:  Denies any difficulties with the urinary system or sexual function except noted above.    Past Medical History:   Diagnosis Date    Allergy     Anemia     Asthma     Chronic diarrhea     Elevated C-reactive protein (CRP)     Fibromyalgia     GERD (gastroesophageal reflux disease)     Headache(784.0)     Irritable bowel syndrome     Mental disorder     Bipolar Disorder, Anxiety, Depression    Motion sickness     Motion sickness     PCOS (polycystic ovarian syndrome)      Rheumatoid arthritis     Seizures     childhood     Past Surgical History:   Procedure Laterality Date    HYSTERECTOMY      LASER LAPAROSCOPY      left hip surgery had hardware replaced then removed      SINUS SURGERY       Family History   Problem Relation Age of Onset    Rheum arthritis Maternal Grandmother     Bipolar disorder Other     Cataracts Mother     Cataracts Maternal Grandfather     Asthma Maternal Aunt     Alcohol abuse Maternal Aunt     Depression Maternal Aunt     Diabetes Mellitus Maternal Aunt     Heart disease Maternal Aunt     Alcohol abuse Maternal Uncle     Bipolar disorder Maternal Uncle     Heart failure Maternal Uncle     Breast cancer Neg Hx     Ovarian cancer Neg Hx     Migraines Neg Hx      Social history:  Occupation: school psychologist     Fostering   Social History     Tobacco Use    Smoking status: Never Smoker    Smokeless tobacco: Never Used   Substance Use Topics    Alcohol use: No    Drug use: No     Review of patient's allergies indicates:   Allergen Reactions    Pertussis vaccines     Decongestant allergy Palpitations         Current Outpatient Medications:     albuterol (PROVENTIL/VENTOLIN HFA) 90 mcg/actuation inhaler, Inhale 2 puffs into the lungs every 4 (four) hours as needed for Wheezing or Shortness of Breath. Rescue, Disp: 8 g, Rfl: 2    ALPRAZolam (XANAX) 0.5 MG tablet, Take 0.5 mg by mouth daily as needed., Disp: , Rfl:     azelaic acid (AZELEX) 15 % gel, SMARTSI Sparingly Topical Every Morning, Disp: , Rfl:     clobetasoL (TEMOVATE) 0.05 % external solution, Apply topically 2 (two) times daily. Apply to the scalp, Disp: 50 mL, Rfl: 1    clonazePAM (KLONOPIN) 0.5 MG tablet, Take 0.5 mg by mouth daily as needed., Disp: , Rfl:     ENBREL SURECLICK 50 mg/mL (0.98 mL) Serene, , Disp: , Rfl: 2    EPICERAM Ramon, Apply topically 2 (two) times daily., Disp: , Rfl:     fluticasone propionate (FLONASE) 50 mcg/actuation nasal spray, SHAKE  LIQUID AND USE 2 SPRAYS(100 MCG) IN EACH NOSTRIL EVERY DAY FOR 14 DAYS, Disp: 48 g, Rfl: 0    fluticasone-salmeterol diskus inhaler 250-50 mcg, Inhale 1 puff into the lungs 2 (two) times daily. Controller, Disp: 60 each, Rfl: 11    linaCLOtide (LINZESS) 290 mcg Cap capsule, Take 1 capsule (290 mcg total) by mouth before breakfast., Disp: 30 capsule, Rfl: 11    pantoprazole (PROTONIX) 40 MG tablet, TAKE 1 TABLET BY MOUTH DAILY, Disp: 90 tablet, Rfl: 3    predniSONE (DELTASONE) 20 MG tablet, Take 1 tablet (20 mg total) by mouth once daily., Disp: 12 tablet, Rfl: 0    promethazine-dextromethorphan (PROMETHAZINE-DM) 6.25-15 mg/5 mL Syrp, Take 5 mLs by mouth every 8 (eight) hours as needed (cough)., Disp: 180 mL, Rfl: 0    propranoloL (INDERAL LA) 80 MG 24 hr capsule, TAKE 1 CAPSULE(80 MG) BY MOUTH EVERY NIGHT, Disp: 30 capsule, Rfl: 11    rizatriptan (MAXALT) 10 MG tablet, Take 1 tablet (10 mg total) by mouth 3 (three) times daily as needed for Migraine. Take 1 tablet every 2 hours up to 3 times per day., Disp: 9 tablet, Rfl: 0    SOOLANTRA 1 % Crea, SMARTSI Sparingly Topical Every Night, Disp: , Rfl:     topiramate (TOPAMAX) 100 MG tablet, TAKE 1 TABLET(100 MG) BY MOUTH TWICE DAILY, Disp: 60 tablet, Rfl: 5    venlafaxine (EFFEXOR-XR) 150 MG Cp24, Take 150 mg by mouth once daily., Disp: , Rfl:       PHYSICAL EXAMINATION  Vitals:    06/10/22 0952   BP: 112/71   Pulse: 76   Resp: 17     General: The patient is a well-developed, well-nourished looking of stated age in no acute distress.  Head: Normocephalic, atraumatic  Eyes, ears, nose and throat: normal.  Neck: Supple  Cardiovascular:  No carotid bruits.  Regular rate and rhythm.   GI: Abdomen soft, not tender, not distended.  Skin: no rashes  Extremities: No clubbing, cyanosis, edema.  NEUROLOGIC EXAM:  MENTAL: The patient is awake, alert and oriented times to time, place, location and situation. Intact recent memory, attention, concentration. Language and  speech are normal. No aphasia, no dysarthria  CRANIAL NERVES:   CN II: visual fields are intact to confrontation with no defects;  funduscopic examination is within normal limits without evidence of papilledema and signs of venous pulsations.  Pupils are equal, round and reactive to light and accommodation.    III, IV and VI: extraocular movements are intact to all directions of gaze with normal convergence.  V: facial sensation is intact to light touch in divisions V1 through V3.    VII: Facial muscle strength is intact to eyebrow raising, forceful eyelid closure, and cheek puffing.    VIII: Hearing acuity is intact to finger rub.  IX, X: palate rises symmetrically and uvula midline.    XI: Sternocleidomastoid and trapezius strength are 5/5 bilaterally.    XII: Tongue protrudes midline without atrophy or fasciculations.   MOTOR EXAM: No atrophy or fasciculations are present. No pronator drift,  shows normal strength ( 5/5 strength )in all 4 extremities. Tone is normal. SENSORY EXAM: intact pinprick, light touch, vibration, and position bilaterally.  CEREBELLAR EXAM: shows normal finger-to-nose and heel-to-shin.   DEEP TENDON REFLEXES:  +2 in brachioradialis, biceps, triceps, knee jerks, ankle jerks, downgoing toes b/l. No abnormal reflexes are present.  GAIT/STANCE: Romberg Negative.  Standard gait was normal with normal stride, arm swing and turning.  Normal heel and toe walking and tandem gait.       Impression:  Migraine without aura - high frequency episodic- she had been doing very well on  mg bid , but after getting COVID infection on 1/22 she has had slowly worsening of headache, now having more frequent attacks, they are longer and not responding as well to acute medication. Neurological examination revealed allodynia in Left V2, but no neurological deficits. Given the increase in attacks and the burning in the V1/v2 distribution with severe attacks will go ahead and get neuroimaging studies. We will  start CGRP Mab Ajovy for prevention of migraine. Discussed need to change rizatriptan as it interacts with propranolol she is on for HTN and despite being on 10 mg while on propranolol it still does not resolve attacks. We will trial eletriptan and also give a Gepant as she has had 2 triptans without much benefit. She is in agreement with plan.     Comorbidities:  IBS -C - on Linzess    PCOS - s/p hysterectomy partial    RA, fibromyalgia  - on Enbrel , followed by rheumatology   childhood seizures - has not had seizure since childhood   GERD   anxiety , depression - doing well on current regimen, anxiety and stress has increased secondary to current fostering child situation   asthma     Plan:   1- For preventive management: a. Start Ajovy 225mg once a month            B. Continue Topiramate 100mg twice a day --> in the future , if things going well, can slowly wean down for now I would recommend continuing.     2- Acute management: discontinue rizatriptan 10mg -> propranolol interacts with rizatriptan and dose has to be half the total dose, but even the 10mg is not resolving attacks.     At onset of migraine: eletriptan 40mg at onset migraine, can repeat after 2 hours. Max 2/day.   For milder to moderate attacks: try Ubrelvy 50mg at onset , can repeat after 2 hours. Max 2/day.     For nausea: add zofran ODT 4mg     3- Continue your other mediation propranolol LA 80mg and venlafaxine XR daily     4- keep headache diary       RTC in 3 months.         Eneida Vogel MD   Board Certified Neurologist  [facility] Certified Headache Medicine     I spent 67  minutes on day of this encounter preparing, treating, and managing this patient with migraine without aura, fibromyalgia,  IBS -C, PCOS, RA, childhood seizures, GERD, anxiety , depression, asthma

## 2022-06-10 NOTE — PATIENT INSTRUCTIONS
1- For preventive management: A. Start Ajovy 225mg once a month             B. Continue Topiramate 100mg twice a day --> in the future , if things going well, can slowly wean down for now I would recommend continuing.     2- Acute management: discontinue rizatriptan 10mg -> propranolol interacts with rizatriptan and dose has to be half the total dose, but even the 10mg is not resolving attacks.     At onset of migraine: eletriptan 40mg at onset migraine, can repeat after 2 hours. Max 2/day.   For milder to moderate attacks: try Ubrelvy 50mg at onset , can repeat after 2 hours. Max 2/day.     For nausea: add zofran ODT 4mg     3- Continue your other mediation propranolol LA 80mg and venlafaxine XR daily     4- keep headache diary     5- MRI brain

## 2022-06-14 ENCOUNTER — TELEPHONE (OUTPATIENT)
Dept: PHARMACY | Facility: CLINIC | Age: 42
End: 2022-06-14
Payer: COMMERCIAL

## 2022-06-21 ENCOUNTER — PATIENT MESSAGE (OUTPATIENT)
Dept: NEUROLOGY | Facility: CLINIC | Age: 42
End: 2022-06-21
Payer: COMMERCIAL

## 2022-06-22 DIAGNOSIS — Z12.31 OTHER SCREENING MAMMOGRAM: ICD-10-CM

## 2022-06-23 ENCOUNTER — PATIENT MESSAGE (OUTPATIENT)
Dept: FAMILY MEDICINE | Facility: CLINIC | Age: 42
End: 2022-06-23
Payer: COMMERCIAL

## 2022-06-23 ENCOUNTER — HOSPITAL ENCOUNTER (OUTPATIENT)
Dept: RADIOLOGY | Facility: HOSPITAL | Age: 42
Discharge: HOME | End: 2022-06-23
Attending: PSYCHIATRY & NEUROLOGY
Payer: COMMERCIAL

## 2022-06-23 DIAGNOSIS — R10.9 ABDOMINAL PAIN, UNSPECIFIED ABDOMINAL LOCATION: Primary | ICD-10-CM

## 2022-06-23 DIAGNOSIS — R51.9 LEFT FACIAL PAIN: ICD-10-CM

## 2022-06-23 PROCEDURE — 70553 MRI BRAIN STEM W/O & W/DYE: CPT | Mod: 26,,, | Performed by: RADIOLOGY

## 2022-06-23 PROCEDURE — 25500020 PHARM REV CODE 255: Mod: PO | Performed by: PSYCHIATRY & NEUROLOGY

## 2022-06-23 PROCEDURE — 70553 MRI BRAIN STEM W/O & W/DYE: CPT | Mod: TC,PO

## 2022-06-23 PROCEDURE — 70553 MRI BRAIN W WO CONTRAST: ICD-10-PCS | Mod: 26,,, | Performed by: RADIOLOGY

## 2022-06-23 PROCEDURE — A9585 GADOBUTROL INJECTION: HCPCS | Mod: PO | Performed by: PSYCHIATRY & NEUROLOGY

## 2022-06-23 RX ORDER — GADOBUTROL 604.72 MG/ML
9 INJECTION INTRAVENOUS
Status: COMPLETED | OUTPATIENT
Start: 2022-06-23 | End: 2022-06-23

## 2022-06-23 RX ADMIN — GADOBUTROL 9 ML: 604.72 INJECTION INTRAVENOUS at 01:06

## 2022-06-24 ENCOUNTER — IMMUNIZATION (OUTPATIENT)
Dept: PHARMACY | Facility: CLINIC | Age: 42
End: 2022-06-24
Payer: COMMERCIAL

## 2022-06-24 DIAGNOSIS — Z23 NEED FOR VACCINATION: Primary | ICD-10-CM

## 2022-07-06 ENCOUNTER — PATIENT MESSAGE (OUTPATIENT)
Dept: FAMILY MEDICINE | Facility: CLINIC | Age: 42
End: 2022-07-06
Payer: COMMERCIAL

## 2022-07-13 ENCOUNTER — PATIENT MESSAGE (OUTPATIENT)
Dept: FAMILY MEDICINE | Facility: CLINIC | Age: 42
End: 2022-07-13

## 2022-07-13 ENCOUNTER — OFFICE VISIT (OUTPATIENT)
Dept: FAMILY MEDICINE | Facility: CLINIC | Age: 42
End: 2022-07-13
Payer: COMMERCIAL

## 2022-07-13 ENCOUNTER — HOSPITAL ENCOUNTER (OUTPATIENT)
Dept: RADIOLOGY | Facility: HOSPITAL | Age: 42
Discharge: HOME | End: 2022-07-13
Attending: NURSE PRACTITIONER
Payer: COMMERCIAL

## 2022-07-13 VITALS
TEMPERATURE: 98 F | HEIGHT: 68 IN | BODY MASS INDEX: 33.31 KG/M2 | WEIGHT: 219.81 LBS | HEART RATE: 85 BPM | OXYGEN SATURATION: 98 % | DIASTOLIC BLOOD PRESSURE: 74 MMHG | SYSTOLIC BLOOD PRESSURE: 116 MMHG

## 2022-07-13 DIAGNOSIS — E66.9 CLASS 1 OBESITY WITH BODY MASS INDEX (BMI) OF 33.0 TO 33.9 IN ADULT, UNSPECIFIED OBESITY TYPE, UNSPECIFIED WHETHER SERIOUS COMORBIDITY PRESENT: ICD-10-CM

## 2022-07-13 DIAGNOSIS — D84.821 IMMUNOSUPPRESSION DUE TO DRUG THERAPY: ICD-10-CM

## 2022-07-13 DIAGNOSIS — Z79.899 IMMUNOSUPPRESSION DUE TO DRUG THERAPY: ICD-10-CM

## 2022-07-13 DIAGNOSIS — J45.40 MODERATE PERSISTENT ASTHMA WITHOUT COMPLICATION: Primary | ICD-10-CM

## 2022-07-13 DIAGNOSIS — M06.9 RHEUMATOID ARTHRITIS, INVOLVING UNSPECIFIED SITE, UNSPECIFIED WHETHER RHEUMATOID FACTOR PRESENT: ICD-10-CM

## 2022-07-13 DIAGNOSIS — R05.3 PERSISTENT COUGH: ICD-10-CM

## 2022-07-13 DIAGNOSIS — Z80.8 FAMILY HISTORY OF THYROID CANCER: ICD-10-CM

## 2022-07-13 DIAGNOSIS — E66.9 OBESITY (BMI 30.0-34.9): Primary | ICD-10-CM

## 2022-07-13 PROCEDURE — 3078F PR MOST RECENT DIASTOLIC BLOOD PRESSURE < 80 MM HG: ICD-10-PCS | Mod: CPTII,S$GLB,, | Performed by: NURSE PRACTITIONER

## 2022-07-13 PROCEDURE — 3078F DIAST BP <80 MM HG: CPT | Mod: CPTII,S$GLB,, | Performed by: NURSE PRACTITIONER

## 2022-07-13 PROCEDURE — 1160F PR REVIEW ALL MEDS BY PRESCRIBER/CLIN PHARMACIST DOCUMENTED: ICD-10-PCS | Mod: CPTII,S$GLB,, | Performed by: NURSE PRACTITIONER

## 2022-07-13 PROCEDURE — 99999 PR PBB SHADOW E&M-EST. PATIENT-LVL V: ICD-10-PCS | Mod: PBBFAC,,, | Performed by: NURSE PRACTITIONER

## 2022-07-13 PROCEDURE — 3074F SYST BP LT 130 MM HG: CPT | Mod: CPTII,S$GLB,, | Performed by: NURSE PRACTITIONER

## 2022-07-13 PROCEDURE — 99214 OFFICE O/P EST MOD 30 MIN: CPT | Mod: S$GLB,,, | Performed by: NURSE PRACTITIONER

## 2022-07-13 PROCEDURE — 3008F BODY MASS INDEX DOCD: CPT | Mod: CPTII,S$GLB,, | Performed by: NURSE PRACTITIONER

## 2022-07-13 PROCEDURE — 1159F PR MEDICATION LIST DOCUMENTED IN MEDICAL RECORD: ICD-10-PCS | Mod: CPTII,S$GLB,, | Performed by: NURSE PRACTITIONER

## 2022-07-13 PROCEDURE — 3008F PR BODY MASS INDEX (BMI) DOCUMENTED: ICD-10-PCS | Mod: CPTII,S$GLB,, | Performed by: NURSE PRACTITIONER

## 2022-07-13 PROCEDURE — 71046 X-RAY EXAM CHEST 2 VIEWS: CPT | Mod: 26,,, | Performed by: RADIOLOGY

## 2022-07-13 PROCEDURE — 99214 PR OFFICE/OUTPT VISIT, EST, LEVL IV, 30-39 MIN: ICD-10-PCS | Mod: S$GLB,,, | Performed by: NURSE PRACTITIONER

## 2022-07-13 PROCEDURE — 99999 PR PBB SHADOW E&M-EST. PATIENT-LVL V: CPT | Mod: PBBFAC,,, | Performed by: NURSE PRACTITIONER

## 2022-07-13 PROCEDURE — 1160F RVW MEDS BY RX/DR IN RCRD: CPT | Mod: CPTII,S$GLB,, | Performed by: NURSE PRACTITIONER

## 2022-07-13 PROCEDURE — 71046 X-RAY EXAM CHEST 2 VIEWS: CPT | Mod: TC,FY,PO

## 2022-07-13 PROCEDURE — 3074F PR MOST RECENT SYSTOLIC BLOOD PRESSURE < 130 MM HG: ICD-10-PCS | Mod: CPTII,S$GLB,, | Performed by: NURSE PRACTITIONER

## 2022-07-13 PROCEDURE — 71046 XR CHEST PA AND LATERAL: ICD-10-PCS | Mod: 26,,, | Performed by: RADIOLOGY

## 2022-07-13 PROCEDURE — 1159F MED LIST DOCD IN RCRD: CPT | Mod: CPTII,S$GLB,, | Performed by: NURSE PRACTITIONER

## 2022-07-13 RX ORDER — FLUTICASONE PROPIONATE AND SALMETEROL 250; 50 UG/1; UG/1
1 POWDER RESPIRATORY (INHALATION) 2 TIMES DAILY
Qty: 60 EACH | Refills: 11 | Status: SHIPPED | OUTPATIENT
Start: 2022-07-13 | End: 2023-07-13

## 2022-07-13 RX ORDER — MONTELUKAST SODIUM 10 MG/1
10 TABLET ORAL NIGHTLY
Qty: 30 TABLET | Refills: 5 | Status: SHIPPED | OUTPATIENT
Start: 2022-07-13 | End: 2022-12-26

## 2022-07-13 NOTE — PROGRESS NOTES
Subjective:       Patient ID: Pura Gudino is a 42 y.o. female.    Chief Complaint: Cough (And wheezing post covid since January; ants to discuss weight loss medication)    HPI  New patient to me with history of asthma reports intermittent dry cough and wheezing since Covid infection in 6 months ago  Prior to Covid infection asthma was controlled without controller   3/16/22 prescribed Advair by PCP--used until it ran out   4/14/22 prescribed azithromycin and prednisone taper     Patient is interested in ozempic for obesity --BMI 33.42  Patient reports mother had thyroid cancer--possibly medullary. Advised on absolute CI if so. She will have mother confirm   No personal or FH of MEN2    Vitals:    07/13/22 1017   BP: 116/74   Pulse: 85   Temp: 98 °F (36.7 °C)     Review of Systems   Constitutional: Negative for diaphoresis and fever.   HENT: Negative for facial swelling and trouble swallowing.    Respiratory: Negative for cough and shortness of breath.    Cardiovascular: Negative for chest pain.   Gastrointestinal: Negative for abdominal pain.   Genitourinary: Negative for difficulty urinating.   Musculoskeletal: Negative for gait problem.   Skin: Negative for pallor and rash.   Neurological: Negative for speech difficulty.   Psychiatric/Behavioral: Negative for confusion. The patient is not nervous/anxious.        Past Medical History:   Diagnosis Date    Allergy     Anemia     Asthma     Chronic diarrhea     Elevated C-reactive protein (CRP)     Fibromyalgia     GERD (gastroesophageal reflux disease)     Headache(784.0)     Irritable bowel syndrome     Mental disorder     Bipolar Disorder, Anxiety, Depression    Motion sickness     Motion sickness     PCOS (polycystic ovarian syndrome)     Rheumatoid arthritis     Seizures     childhood     Objective:      Physical Exam  Vitals and nursing note reviewed.   Constitutional:       General: She is not in acute distress.     Appearance: She is  obese. She is not diaphoretic.   HENT:      Head: Normocephalic.   Eyes:      General:         Right eye: No discharge.         Left eye: No discharge.   Neck:      Trachea: No tracheal deviation.   Cardiovascular:      Rate and Rhythm: Normal rate.      Heart sounds: Normal heart sounds.   Pulmonary:      Effort: Pulmonary effort is normal.      Breath sounds: Normal breath sounds.   Musculoskeletal:      Right lower leg: No edema.      Left lower leg: No edema.   Skin:     Coloration: Skin is not pale.   Neurological:      Mental Status: She is alert and oriented to person, place, and time.   Psychiatric:         Speech: Speech normal.         Behavior: Behavior normal.         Thought Content: Thought content normal.         Judgment: Judgment normal.         Assessment:       1. Moderate persistent asthma without complication    2. Persistent cough    3. Class 1 obesity with body mass index (BMI) of 33.0 to 33.9 in adult, unspecified obesity type, unspecified whether serious comorbidity present    4. Family history of thyroid cancer    5. Rheumatoid arthritis, involving unspecified site, unspecified whether rheumatoid factor present    6. Immunosuppression due to drug therapy        Plan:       Moderate persistent asthma without complication  -     montelukast (SINGULAIR) 10 mg tablet; Take 1 tablet (10 mg total) by mouth every evening.  Dispense: 30 tablet; Refill: 5  -     fluticasone-salmeterol diskus inhaler 250-50 mcg; Inhale 1 puff into the lungs 2 (two) times daily. Controller  Dispense: 60 each; Refill: 11    Persistent cough  -     X-Ray Chest PA And Lateral; Future; Expected date: 07/13/2022    Class 1 obesity with body mass index (BMI) of 33.0 to 33.9 in adult, unspecified obesity type, unspecified whether serious comorbidity present  Family history of thyroid cancer   Patient will message me if mom did not have MTC    Rheumatoid arthritis, involving unspecified site, unspecified whether rheumatoid  factor present    Immunosuppression due to drug therapy      Restart Advair and Singulair  CXR today  FU 1 month with PCP as scheduled              Medication List with Changes/Refills   New Medications    MONTELUKAST (SINGULAIR) 10 MG TABLET    Take 1 tablet (10 mg total) by mouth every evening.   Current Medications    ALBUTEROL (PROVENTIL/VENTOLIN HFA) 90 MCG/ACTUATION INHALER    Inhale 2 puffs into the lungs every 4 (four) hours as needed for Wheezing or Shortness of Breath. Rescue    ALPRAZOLAM (XANAX) 0.5 MG TABLET    Take 0.5 mg by mouth daily as needed.    CLOBETASOL (TEMOVATE) 0.05 % EXTERNAL SOLUTION    Apply topically 2 (two) times daily. Apply to the scalp    CLONAZEPAM (KLONOPIN) 0.5 MG TABLET    Take 0.5 mg by mouth daily as needed.    ELETRIPTAN (RELPAX) 40 MG TABLET    Take 1 tablet (40 mg total) by mouth as needed (migraine). may repeat in 2 hours if necessary. Max is 2/day.    ENBREL SURECLICK 50 MG/ML (0.98 ML) PNIJ        EPICERAM VINOD    Apply topically 2 (two) times daily.    FLUTICASONE PROPIONATE (FLONASE) 50 MCG/ACTUATION NASAL SPRAY    SHAKE LIQUID AND USE 2 SPRAYS(100 MCG) IN EACH NOSTRIL EVERY DAY FOR 14 DAYS    FREMANEZUMAB-VFRM (AJOVY AUTOINJECTOR) 225 MG/1.5 ML AUTOINJECTOR    Inject 1.5 mLs (225 mg total) into the skin every 30 days.    LINACLOTIDE (LINZESS) 290 MCG CAP CAPSULE    Take 1 capsule (290 mcg total) by mouth before breakfast.    PANTOPRAZOLE (PROTONIX) 40 MG TABLET    TAKE 1 TABLET BY MOUTH DAILY    PROPRANOLOL (INDERAL LA) 80 MG 24 HR CAPSULE    TAKE 1 CAPSULE(80 MG) BY MOUTH EVERY NIGHT    RIZATRIPTAN (MAXALT) 10 MG TABLET    Take 1 tablet (10 mg total) by mouth 3 (three) times daily as needed for Migraine. Take 1 tablet every 2 hours up to 3 times per day.    TOPIRAMATE (TOPAMAX) 100 MG TABLET    TAKE 1 TABLET(100 MG) BY MOUTH TWICE DAILY    VENLAFAXINE (EFFEXOR-XR) 150 MG CP24    Take 150 mg by mouth once daily.   Changed and/or Refilled Medications    Modified  Medication Previous Medication    FLUTICASONE-SALMETEROL DISKUS INHALER 250-50 MCG fluticasone-salmeterol diskus inhaler 250-50 mcg       Inhale 1 puff into the lungs 2 (two) times daily. Controller    Inhale 1 puff into the lungs 2 (two) times daily. Controller   Discontinued Medications    AZELAIC ACID (AZELEX) 15 % GEL    SMARTSI Sparingly Topical Every Morning    PREDNISONE (DELTASONE) 20 MG TABLET    Take 1 tablet (20 mg total) by mouth once daily.    PROMETHAZINE-DEXTROMETHORPHAN (PROMETHAZINE-DM) 6.25-15 MG/5 ML SYRP    Take 5 mLs by mouth every 8 (eight) hours as needed (cough).    SOOLANTRA 1 % CREA    SMARTSI Sparingly Topical Every Night

## 2022-07-18 ENCOUNTER — TELEPHONE (OUTPATIENT)
Dept: DERMATOLOGY | Facility: CLINIC | Age: 42
End: 2022-07-18
Payer: COMMERCIAL

## 2022-07-18 NOTE — TELEPHONE ENCOUNTER
Spoke to pt and pt stated that she is scheduled to have her Mohs sx at a surgery center in [place] for bx proven BCC left superior temple. Will inform referring doctor, Dr. Bangura.

## 2022-07-18 NOTE — TELEPHONE ENCOUNTER
Spoke to [place] from Dr. Bangura office and informed her with the information that pt has decided to have the sx at a surgery center in [place]. Salina stated that she will inform Dr. Bangura with the information.

## 2022-08-16 ENCOUNTER — OFFICE VISIT (OUTPATIENT)
Dept: FAMILY MEDICINE | Facility: CLINIC | Age: 42
End: 2022-08-16
Payer: COMMERCIAL

## 2022-08-16 DIAGNOSIS — J06.9 UPPER RESPIRATORY TRACT INFECTION, UNSPECIFIED TYPE: Primary | ICD-10-CM

## 2022-08-16 DIAGNOSIS — R11.0 NAUSEA: ICD-10-CM

## 2022-08-16 PROCEDURE — 99213 OFFICE O/P EST LOW 20 MIN: CPT | Mod: 95,,, | Performed by: FAMILY MEDICINE

## 2022-08-16 PROCEDURE — 99213 PR OFFICE/OUTPT VISIT, EST, LEVL III, 20-29 MIN: ICD-10-PCS | Mod: 95,,, | Performed by: FAMILY MEDICINE

## 2022-08-16 RX ORDER — AZITHROMYCIN 250 MG/1
TABLET, FILM COATED ORAL
Qty: 6 TABLET | Refills: 0 | Status: SHIPPED | OUTPATIENT
Start: 2022-08-16 | End: 2022-08-21

## 2022-08-16 NOTE — PROGRESS NOTES
The patient location is: [place]  The chief complaint leading to consultation is: sick  Visit type: Virtual visit with synchronous audio and video  Total time spent with patient: 15 minutes  Each patient to whom he or she provides medical services by telemedicine is:  (1) informed of the relationship between the physician and patient and the respective role of any other health care provider with respect to management of the patient; and (2) notified that he or she may decline to receive medical services by telemedicine and may withdraw from such care at any time.    HPI:    Onset 2 days ago with sore throat, nauseated, fatigue,  constipated, runny nose, green, some wheezing.  She feels like symptoms   No fever.  Home covid test negative today.    4 year-old foster son sick presently    Started Ozepmic 5 weeks ago  Increased to 0.5mg 3 days ago. Some increased constipation and nuasea since increasing the dose.  Lost 10 pounds since starting    Taking Embrel for RA.  Taking Linzess for IBS.      Past Medical History:   Diagnosis Date    Allergy     Anemia     Asthma     Chronic diarrhea     Elevated C-reactive protein (CRP)     Fibromyalgia     GERD (gastroesophageal reflux disease)     Headache(784.0)     Irritable bowel syndrome     Mental disorder     Bipolar Disorder, Anxiety, Depression    Motion sickness     Motion sickness     PCOS (polycystic ovarian syndrome)     Rheumatoid arthritis     Seizures     childhood       Past Surgical History:   Procedure Laterality Date    HYSTERECTOMY      LASER LAPAROSCOPY      left hip surgery had hardware replaced then removed      SINUS SURGERY         Review of patient's allergies indicates:   Allergen Reactions    Pertussis vaccines     Decongestant allergy Palpitations       Social History     Socioeconomic History    Marital status:     Number of children: 0    Years of education: 19   Occupational History    Occupation: psychologist   Tobacco  Use    Smoking status: Never Smoker    Smokeless tobacco: Never Used   Substance and Sexual Activity    Alcohol use: No    Drug use: No    Sexual activity: Yes     Birth control/protection: OCP     Social Determinants of Health     Financial Resource Strain: Unknown    Difficulty of Paying Living Expenses: Patient refused   Food Insecurity: Unknown    Worried About Running Out of Food in the Last Year: Patient refused    Ran Out of Food in the Last Year: Patient refused   Transportation Needs: Unknown    Lack of Transportation (Medical): Patient refused    Lack of Transportation (Non-Medical): Patient refused   Physical Activity: Unknown    Days of Exercise per Week: Patient refused   Stress: Unknown    Feeling of Stress : Patient refused   Social Connections: Unknown    Frequency of Communication with Friends and Family: Patient refused    Frequency of Social Gatherings with Friends and Family: Patient refused    Active Member of Clubs or Organizations: Patient refused    Attends Club or Organization Meetings: Patient refused    Marital Status: Patient refused   Housing Stability: Unknown    Unable to Pay for Housing in the Last Year: Patient refused    Unstable Housing in the Last Year: Patient refused       Current Outpatient Medications on File Prior to Visit   Medication Sig Dispense Refill    albuterol (PROVENTIL/VENTOLIN HFA) 90 mcg/actuation inhaler Inhale 2 puffs into the lungs every 4 (four) hours as needed for Wheezing or Shortness of Breath. Rescue 8 g 2    ALPRAZolam (XANAX) 0.5 MG tablet Take 0.5 mg by mouth daily as needed.      clobetasoL (TEMOVATE) 0.05 % external solution Apply topically 2 (two) times daily. Apply to the scalp 50 mL 1    clonazePAM (KLONOPIN) 0.5 MG tablet Take 0.5 mg by mouth daily as needed.      eletriptan (RELPAX) 40 MG tablet Take 1 tablet (40 mg total) by mouth as needed (migraine). may repeat in 2 hours if necessary. Max is 2/day. 12 tablet 6     ENBREL SURECLICK 50 mg/mL (0.98 mL) PnIj   2    EPICERAM Ramon Apply topically 2 (two) times daily.      fluticasone propionate (FLONASE) 50 mcg/actuation nasal spray SHAKE LIQUID AND USE 2 SPRAYS(100 MCG) IN EACH NOSTRIL EVERY DAY FOR 14 DAYS 48 g 0    fluticasone-salmeterol diskus inhaler 250-50 mcg Inhale 1 puff into the lungs 2 (two) times daily. Controller 60 each 11    fremanezumab-vfrm (AJOVY AUTOINJECTOR) 225 mg/1.5 mL autoinjector Inject 1.5 mLs (225 mg total) into the skin every 30 days. 1.5 mL 11    linaCLOtide (LINZESS) 290 mcg Cap capsule Take 1 capsule (290 mcg total) by mouth before breakfast. 30 capsule 11    pantoprazole (PROTONIX) 40 MG tablet TAKE 1 TABLET BY MOUTH DAILY 90 tablet 3    propranoloL (INDERAL LA) 80 MG 24 hr capsule TAKE 1 CAPSULE(80 MG) BY MOUTH EVERY NIGHT 30 capsule 11    rizatriptan (MAXALT) 10 MG tablet Take 1 tablet (10 mg total) by mouth 3 (three) times daily as needed for Migraine. Take 1 tablet every 2 hours up to 3 times per day. 9 tablet 0    semaglutide (OZEMPIC) 0.25 mg or 0.5 mg(2 mg/1.5 mL) pen injector Inject 0.25 mg into the skin every 7 days. 1 pen 2    topiramate (TOPAMAX) 100 MG tablet TAKE 1 TABLET(100 MG) BY MOUTH TWICE DAILY 60 tablet 5    venlafaxine (EFFEXOR-XR) 150 MG Cp24 Take 150 mg by mouth once daily.       No current facility-administered medications on file prior to visit.       Family History   Problem Relation Age of Onset    Rheum arthritis Maternal Grandmother     Bipolar disorder Other     Cataracts Mother     Cataracts Maternal Grandfather     Asthma Maternal Aunt     Alcohol abuse Maternal Aunt     Depression Maternal Aunt     Diabetes Mellitus Maternal Aunt     Heart disease Maternal Aunt     Alcohol abuse Maternal Uncle     Bipolar disorder Maternal Uncle     Heart failure Maternal Uncle     Breast cancer Neg Hx     Ovarian cancer Neg Hx     Migraines Neg Hx         Review of Systems   HENT: Positive for congestion and  sore throat. Negative for ear discharge and ear pain.    Respiratory: Positive for stridor. Negative for cough and shortness of breath.    Gastrointestinal: Positive for nausea. Negative for abdominal pain, diarrhea and vomiting.   Musculoskeletal: Negative for neck pain.   Neurological: Positive for headaches.       Physical Exam  Constitutional:       Appearance: She is well-developed.   HENT:      Head: Normocephalic and atraumatic.   Eyes:      Pupils: Pupils are equal, round, and reactive to light.   Pulmonary:      Effort: Pulmonary effort is normal.   Musculoskeletal:      Cervical back: Neck supple.   Neurological:      Mental Status: She is alert and oriented to person, place, and time.   Psychiatric:         Behavior: Behavior normal.         Thought Content: Thought content normal.         Judgment: Judgment normal.          Upper respiratory tract infection, unspecified type  -     azithromycin (Z-AKIKO) 250 MG tablet; Take 2 tablets by mouth on day 1; Take 1 tablet by mouth on days 2-5  Dispense: 6 tablet; Refill: 0    Nausea     Instructions for Patients with Confirmed or Suspected COVID-19    If you are awaiting your test result, you will either be called or it will be released to the patient portal.  If you have any questions about your test, please visit [email or web address] or call our COVID-19 information line at [phone].      Please isolate yourself at home.  You may leave home and/or return to work once the following conditions are met:    If you have symptoms and tested positive:   More than 5 days since symptoms first appeared AND   More than 24 hours fever free without medications AND       symptoms have improved   · For five days after ending isolation, masks are required.    If you had no symptoms but tested positive:   More than 5 days since the date of the first positive test. If you develop symptoms, then use the guidelines above  · For five days after ending isolation,  masks are required.      Testing is not recommended if you are symptom free after completing isolation.    Begin Zpack for expectant treatment of possible bacterial URI due to Embrel use  Check covid home test again tomorrow; contact office for possible Paxlovid if positive since taking immunosuppresant  Continue Ozempic 0.5mg (current nasuea likely transient since she just increased the dose)  Ok to use Zofran PRN for nausea    Answers for HPI/ROS submitted by the patient on 8/16/2022  Chronicity: new  Onset: in the past 7 days  Progression since onset: gradually worsening  Pain worse on: left  Fever: no fever  Pain - numeric: 3/10  drooling: No  hoarse voice: No  plugged ear sensation: No  swollen glands: No  trouble swallowing: No  strep: No  mono: No  Treatments tried: NSAIDs, cool liquids  Improvement on treatment: mild  Pain severity: mild

## 2022-08-24 ENCOUNTER — PATIENT MESSAGE (OUTPATIENT)
Dept: ADMINISTRATIVE | Facility: HOSPITAL | Age: 42
End: 2022-08-24
Payer: COMMERCIAL

## 2022-09-05 ENCOUNTER — PATIENT MESSAGE (OUTPATIENT)
Dept: FAMILY MEDICINE | Facility: CLINIC | Age: 42
End: 2022-09-05
Payer: COMMERCIAL

## 2022-09-05 RX ORDER — FLUCONAZOLE 150 MG/1
150 TABLET ORAL DAILY
Qty: 1 TABLET | Refills: 0 | Status: SHIPPED | OUTPATIENT
Start: 2022-09-05 | End: 2022-09-06

## 2022-09-05 NOTE — TELEPHONE ENCOUNTER
You prescribed antibiotic for URI. She states she now has a yeast infection. She is requesting a prescription for diflucan. Please advise

## 2022-09-07 ENCOUNTER — PATIENT MESSAGE (OUTPATIENT)
Dept: OBSTETRICS AND GYNECOLOGY | Facility: CLINIC | Age: 42
End: 2022-09-07
Payer: COMMERCIAL

## 2022-09-07 ENCOUNTER — OFFICE VISIT (OUTPATIENT)
Dept: FAMILY MEDICINE | Facility: CLINIC | Age: 42
End: 2022-09-07
Payer: COMMERCIAL

## 2022-09-07 DIAGNOSIS — K59.00 CONSTIPATION, UNSPECIFIED CONSTIPATION TYPE: Primary | ICD-10-CM

## 2022-09-07 PROCEDURE — 99213 OFFICE O/P EST LOW 20 MIN: CPT | Mod: 95,,, | Performed by: INTERNAL MEDICINE

## 2022-09-07 PROCEDURE — 99213 PR OFFICE/OUTPT VISIT, EST, LEVL III, 20-29 MIN: ICD-10-PCS | Mod: 95,,, | Performed by: INTERNAL MEDICINE

## 2022-09-07 RX ORDER — PLECANATIDE 3 MG/1
3 TABLET ORAL DAILY
Qty: 30 TABLET | Refills: 11 | Status: SHIPPED | OUTPATIENT
Start: 2022-09-07 | End: 2023-07-06 | Stop reason: ALTCHOICE

## 2022-09-07 NOTE — PROGRESS NOTES
Subjective:       Patient ID: Pura Gudino is a 42 y.o. female.    Chief Complaint: No chief complaint on file.    The patient location is: car  The chief complaint leading to consultation is: constipation    Visit type: audiovisual    Face to Face time with patient: 20   minutes of total time spent on the encounter, which includes face to face time and non-face to face time preparing to see the patient (eg, review of tests), Obtaining and/or reviewing separately obtained history, Documenting clinical information in the electronic or other health record, Independently interpreting results (not separately reported) and communicating results to the patient/family/caregiver, or Care coordination (not separately reported).         Each patient to whom he or she provides medical services by telemedicine is:  (1) informed of the relationship between the physician and patient and the respective role of any other health care provider with respect to management of the patient; and (2) notified that he or she may decline to receive medical services by telemedicine and may withdraw from such care at any time.    Notes:     Pt ccould not toelrate ozempic d/t constipation. She was having painful bleeding with bms. She is on linzess for chronic IC but it is not controlled even off the ozempic. Ozempic did help with weight loss    Review of Systems   Constitutional:  Negative for activity change and unexpected weight change.   HENT:  Negative for hearing loss, rhinorrhea and trouble swallowing.    Eyes:  Negative for discharge and visual disturbance.   Respiratory:  Negative for chest tightness and wheezing.    Cardiovascular:  Negative for chest pain and palpitations.   Gastrointestinal:  Positive for constipation. Negative for blood in stool, diarrhea and vomiting.   Endocrine: Negative for polydipsia and polyuria.   Genitourinary:  Negative for difficulty urinating, dysuria, hematuria and menstrual problem.    Musculoskeletal:  Negative for arthralgias, joint swelling and neck pain.   Neurological:  Negative for weakness and headaches.   Psychiatric/Behavioral:  Negative for confusion and dysphoric mood.        Objective:      Physical Exam  Constitutional:       Appearance: Normal appearance.   HENT:      Head: Normocephalic and atraumatic.   Neurological:      Mental Status: She is alert.   Psychiatric:         Mood and Affect: Mood normal.       Assessment:       Problem List Items Addressed This Visit    None  Visit Diagnoses       Constipation, unspecified constipation type    -  Primary            Plan:       Constipation uncontrolled on linzess- switch to trulance, if can get under control will resume ozempic

## 2022-09-12 ENCOUNTER — PATIENT MESSAGE (OUTPATIENT)
Dept: NEUROLOGY | Facility: CLINIC | Age: 42
End: 2022-09-12
Payer: COMMERCIAL

## 2022-09-14 ENCOUNTER — OFFICE VISIT (OUTPATIENT)
Dept: FAMILY MEDICINE | Facility: CLINIC | Age: 42
End: 2022-09-14
Payer: COMMERCIAL

## 2022-09-14 DIAGNOSIS — K59.00 CONSTIPATION, UNSPECIFIED CONSTIPATION TYPE: Primary | ICD-10-CM

## 2022-09-14 PROCEDURE — 99213 OFFICE O/P EST LOW 20 MIN: CPT | Mod: 95,,, | Performed by: INTERNAL MEDICINE

## 2022-09-14 PROCEDURE — 99213 PR OFFICE/OUTPT VISIT, EST, LEVL III, 20-29 MIN: ICD-10-PCS | Mod: 95,,, | Performed by: INTERNAL MEDICINE

## 2022-09-14 NOTE — PROGRESS NOTES
Subjective:       Patient ID: Pura Gudino is a 42 y.o. female.    Chief Complaint: No chief complaint on file.    The patient location is: home  The chief complaint leading to consultation is: constipation    Visit type: audiovisual    Face to Face time with patient: 20   minutes of total time spent on the encounter, which includes face to face time and non-face to face time preparing to see the patient (eg, review of tests), Obtaining and/or reviewing separately obtained history, Documenting clinical information in the electronic or other health record, Independently interpreting results (not separately reported) and communicating results to the patient/family/caregiver, or Care coordination (not separately reported).         Each patient to whom he or she provides medical services by telemedicine is:  (1) informed of the relationship between the physician and patient and the respective role of any other health care provider with respect to management of the patient; and (2) notified that he or she may decline to receive medical services by telemedicine and may withdraw from such care at any time.    Notes:       Ptcalling in for follow up constipation. It has improved on trulance . She is having daily bowel movements. She has not resumed ozempic yet    Review of Systems   Constitutional:  Negative for activity change and unexpected weight change.   HENT:  Negative for hearing loss, rhinorrhea and trouble swallowing.    Eyes:  Negative for discharge and visual disturbance.   Respiratory:  Negative for chest tightness and wheezing.    Cardiovascular:  Negative for chest pain and palpitations.   Gastrointestinal:  Negative for blood in stool, constipation, diarrhea and vomiting.   Endocrine: Negative for polydipsia and polyuria.   Genitourinary:  Negative for difficulty urinating, dysuria, hematuria and menstrual problem.   Musculoskeletal:  Negative for arthralgias, joint swelling and neck pain.    Neurological:  Negative for weakness and headaches.   Psychiatric/Behavioral:  Negative for confusion and dysphoric mood.        Objective:      Physical Exam  Constitutional:       Appearance: Normal appearance.   HENT:      Head: Normocephalic.   Cardiovascular:      Rate and Rhythm: Normal rate and regular rhythm.   Pulmonary:      Effort: Pulmonary effort is normal.      Breath sounds: Normal breath sounds.   Musculoskeletal:         General: Normal range of motion.      Cervical back: Normal range of motion.   Neurological:      General: No focal deficit present.      Mental Status: She is alert.   Psychiatric:         Mood and Affect: Mood normal.         Behavior: Behavior normal.       Assessment:       Problem List Items Addressed This Visit    None  Visit Diagnoses       Constipation, unspecified constipation type    -  Primary            Plan:       Constipation- imrpoved ontrulance. Continue taking and resume ozempic

## 2022-09-15 ENCOUNTER — PATIENT MESSAGE (OUTPATIENT)
Dept: NEUROLOGY | Facility: CLINIC | Age: 42
End: 2022-09-15
Payer: COMMERCIAL

## 2022-09-15 ENCOUNTER — OFFICE VISIT (OUTPATIENT)
Dept: NEUROLOGY | Facility: CLINIC | Age: 42
End: 2022-09-15
Payer: COMMERCIAL

## 2022-09-15 DIAGNOSIS — G43.019 INTRACTABLE MIGRAINE WITHOUT AURA AND WITHOUT STATUS MIGRAINOSUS: Primary | ICD-10-CM

## 2022-09-15 PROCEDURE — 1160F RVW MEDS BY RX/DR IN RCRD: CPT | Mod: CPTII,95,, | Performed by: PSYCHIATRY & NEUROLOGY

## 2022-09-15 PROCEDURE — 99214 PR OFFICE/OUTPT VISIT, EST, LEVL IV, 30-39 MIN: ICD-10-PCS | Mod: 95,,, | Performed by: PSYCHIATRY & NEUROLOGY

## 2022-09-15 PROCEDURE — 1159F PR MEDICATION LIST DOCUMENTED IN MEDICAL RECORD: ICD-10-PCS | Mod: CPTII,95,, | Performed by: PSYCHIATRY & NEUROLOGY

## 2022-09-15 PROCEDURE — 1160F PR REVIEW ALL MEDS BY PRESCRIBER/CLIN PHARMACIST DOCUMENTED: ICD-10-PCS | Mod: CPTII,95,, | Performed by: PSYCHIATRY & NEUROLOGY

## 2022-09-15 PROCEDURE — 1159F MED LIST DOCD IN RCRD: CPT | Mod: CPTII,95,, | Performed by: PSYCHIATRY & NEUROLOGY

## 2022-09-15 PROCEDURE — 99214 OFFICE O/P EST MOD 30 MIN: CPT | Mod: 95,,, | Performed by: PSYCHIATRY & NEUROLOGY

## 2022-09-15 RX ORDER — UBROGEPANT 50 MG/1
50 TABLET ORAL ONCE AS NEEDED
Qty: 16 TABLET | Refills: 6 | Status: SHIPPED | OUTPATIENT
Start: 2022-09-15 | End: 2022-09-20

## 2022-09-15 NOTE — PROGRESS NOTES
[facility]- [facility]    The patient location is: [place]  The chief complaint leading to consultation is: chronic migraine     Visit type: audio only    Time with patient: 12 minutes  32 minutes of total time spent on the encounter, which includes face to face time and non-face to face time preparing to see the patient (eg, review of tests), Obtaining and/or reviewing separately obtained history, Documenting clinical information in the electronic or other health record, Independently interpreting results (not separately reported) and communicating results to the patient/family/caregiver, or Care coordination (not separately reported).         Each patient to whom he or she provides medical services by telemedicine is:  (1) informed of the relationship between the physician and patient and the respective role of any other health care provider with respect to management of the patient; and (2) notified that he or she may decline to receive medical services by telemedicine and may withdraw from such care at any time.    Notes:       Chief complaint: migraine     S    42Y RH F with fibromyalgia,  IBS -C, PCOS, RA, childhood seizures, GERD, anxiety , depression, asthma who presents for further evaluation of headache. She was initially seen on 6/22.   She mentions that she had headache x 3 more weeks after her initial visit. First month of Ajovy 225 mg she did not notice any improvement in headache. Second month some improvement, but after 3rd months she has really noticed a significant improvement. She has not had any side effects from Ajovy. She mentions that she has had 1 migraine attack in the last 4 weeks. She has had 3 mild headache in the last month that  tylenol resolved them and only 1 time did she need 1 migraine that needed eletriptan 40 mg which helped minimally and the attack continued. She has found that Ubrelvy 50 mg works much better than eletriptan 40 mg and only 1 dose of Ubrelvy  "50 mg resolves attack and eletriptan 40 mg will need anther mediation like tylenol and the attacks lasts much longer.  She had a basal cell carcinoma on her left temple resected which caused a lot of pain and triggered the severe attacks for longer. This has resolved now. She is holding off TPX weaning off as she is concerned that she might worsen as she has been on TPX for so long.     Headache history initial visit on 6/10/22:  Age at onset and course over time: began having headache around 2009 typically they were everyday, she was very motion sensitive, car rides made her dizzy/nauseated in 2010 started on  mg bid and she had maybe 1 time every 6 months a migraine level headache. She would turn off all the lights, heavy pillow on head, pressure on face, would take clonazepam to get calm, they were lasting a few hours and would resolve. She was having "sinus headaches" which were 1 time every 3 months. They are mainly pressure in the cheek bones, mild pain, was able to be functional and no migraine symoptms resolved quickly.     She had COVID 19 in 1/22 had body aches, fever, sore throat, coughing, fatigue, headache which lasted for about 1 week, then she had the flu type A about a month after that. She reports that since then she has been having multiple sinus infections, lots of drainage, she has had some sinus headaches and will take Aleve 2-3 times a week for those sinus headache.  The sinus headaches she is having now are mainly frontal maxillary, they will stay mild, post nasal drip, if does not treat with Aleve would intensify and worsen her activities and feels like eyebrows are being pushed. These last for about a few hours the aleve improves it, but will come back in the evening. They don't resolve all the day, but better.     Migraine level pain: every 1-2 weeks or so , starts with the feeling of heat in the left eye crescent shape around the left eye , starts faint and gets stronger that can " last up to 2 days or so. She will also have the light/sound/smell sensitivity, nausea, the pain described as pulsing heat/sharp pain. She will then take the rizatriptan 10mg which   These attacks last about 1-2 days or so. Denies any side effects. Typically rizatriptan 10mg about 3-4 hours. She never does a second dose. Since the worsening she has not had any head imaging. 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 She mentions that when they first started did not start with heat crescent first and then from there.    Location: frontal, around left eye and then temporal and will become variable location  Character: tight band, pressure, heat, sharp, pulsing   Intensity:  1-9/10 , current 1/10   Frequency: 3 times a week milder attack, 1-2 days a week severe   Duration: milder attacks 1 day, migraine 1-2 days   Aura:  Denies   Associated symptoms: photophobia, phonophobia, osmophobia, kinesiophobia, neck tightness/pain, nausea/vomiting  Other neurologic symptoms: nasal congestion, neck tightness/pain, difficulty concentration, problems with task completion, dizziness, OS blurry vision  Precipitating factors:  Stress, physical trigger to the Left V1 distribution like injury or waxing of eye brow   Alleviating factors:  darkness,  local pressure, medications  Aggravating factors: stress  ER/UC visits: 0   Caffeine:  2 cups coffee/work days   Sleep: reports good   GYN: s/p hysterectomy     Medication history:  Acute:  Rizatriptan 10mg - not fully effective   Sumatriptan 100mg- does not work   Eletriptan 40mg - does not work very well   Naproxen- no effect on headache  APAP - no effect   Ubrelvy 50mg - resolves attacks    Preventive:   mg bid - felt it worked very well    Venlafaxine XR 150mg - current, no improvement in headache   Propranolol LA 80mg - current, no benefit in the headache   wellbutrin- no effect on headache   Ajovy 225mg (6/22-9/22): over 50% improvement, noticeable in the 3rd month.     Neuroimaging and other studies:   Results for orders placed or performed during the hospital encounter of 09/14/19   MRI Brain Without Contrast    Narrative    EXAMINATION:  MRI BRAIN WITHOUT CONTRAST    CLINICAL HISTORY:  Headache, chronic, new features or neuro deficit; Other headache syndrome    TECHNIQUE:  Multiplanar multisequence MR imaging of the brain was performed without contrast.    COMPARISON:  None.    FINDINGS:  Normal size ventricles.  No acute infarct or hemorrhage. No mass mass effect or midline shift. Globes and orbital contents are unremarkable. Paranasal sinuses and mastoid air cells are clear. Normal vascular flow voids.      Impression    No acute intracranial abnormality.      Electronically signed by: Satish Rocha  Date:    09/14/2019  Time:    18:11   Results for orders placed or performed during the hospital encounter of 12/14/12   MRI Brain W WO Contrast    Narrative    MRI OF THE BRAIN WITH ANDWITHOUT CONTRAST:     Technique: Multiple and multi-sequence MR imaging of the brain was performed before and after the intravenous administration of 10 cc Gadavist.  The examination was acquired according to IAC protocol.    Comparison: None.    Findings: The semicircular canals, vestibule, and cochlea show normal morphology and fluid signal bilaterally. The seventh/eighth nerve complexes are normal in caliber and signal characteristics.  There is no abnormal enhancement.  There is no mass   within the middle ear, internal auditory canal, or cerebellopontine angle bilaterally.  The mastoid air cells are clear.    The brain pregnant has normal signal characteristics.  Ventricles are normal in size and configuration.  There is no restricted effusion.  No abnormal  enhancement.  The pituitary, brainstem, and cerebellum are normal.  The paranasal sinuses are clear.    The orbits are unremarkable.    Impression         Normal MRI examination of the brain and internal auditory canals.  ______________________________________     Electronically signed by resident: Anthony aMria MD  Date:     12/14/12  Time:    13:04          As the supervising and teaching physician, I personally reviewed the images and resident's interpretation and I agree with the findings.          Electronically signed by: NICOLÁS PAPPAS MD  Date:     12/15/12  Time:    15:48      ROS: The fourteen point review of system was performed and positive for headache.     No changes to PMHx, surgical history, family history from prior visit.     Social history:  Occupation: school psychologist     Fostering   Social History     Tobacco Use    Smoking status: Never    Smokeless tobacco: Never   Substance Use Topics    Alcohol use: No    Drug use: No     Review of patient's allergies indicates:   Allergen Reactions    Pertussis vaccines     Decongestant allergy Palpitations         Current Outpatient Medications:     albuterol (PROVENTIL/VENTOLIN HFA) 90 mcg/actuation inhaler, Inhale 2 puffs into the lungs every 4 (four) hours as needed for Wheezing or Shortness of Breath. Rescue, Disp: 8 g, Rfl: 2    ALPRAZolam (XANAX) 0.5 MG tablet, Take 0.5 mg by mouth daily as needed., Disp: , Rfl:     clobetasoL (TEMOVATE) 0.05 % external solution, Apply topically 2 (two) times daily. Apply to the scalp, Disp: 50 mL, Rfl: 1    clonazePAM (KLONOPIN) 0.5 MG tablet, Take 0.5 mg by mouth daily as needed., Disp: , Rfl:     eletriptan (RELPAX) 40 MG tablet, Take 1 tablet (40 mg total) by mouth as needed (migraine). may repeat in 2 hours if necessary. Max is 2/day., Disp: 12 tablet, Rfl: 6    ENBREL SURECLICK 50 mg/mL (0.98 mL) PnIj, , Disp: , Rfl: 2    EPICERAM Ramon, Apply topically 2 (two) times daily., Disp: , Rfl:      fluticasone propionate (FLONASE) 50 mcg/actuation nasal spray, SHAKE LIQUID AND USE 2 SPRAYS(100 MCG) IN EACH NOSTRIL EVERY DAY FOR 14 DAYS, Disp: 48 g, Rfl: 0    fluticasone-salmeterol diskus inhaler 250-50 mcg, Inhale 1 puff into the lungs 2 (two) times daily. Controller, Disp: 60 each, Rfl: 11    fremanezumab-vfrm (AJOVY AUTOINJECTOR) 225 mg/1.5 mL autoinjector, Inject 1.5 mLs (225 mg total) into the skin every 30 days., Disp: 1.5 mL, Rfl: 11    pantoprazole (PROTONIX) 40 MG tablet, TAKE 1 TABLET BY MOUTH DAILY, Disp: 90 tablet, Rfl: 3    plecanatide (TRULANCE) 3 mg Tab, Take 3 mg by mouth Daily., Disp: 30 tablet, Rfl: 11    propranoloL (INDERAL LA) 80 MG 24 hr capsule, TAKE 1 CAPSULE(80 MG) BY MOUTH EVERY NIGHT, Disp: 30 capsule, Rfl: 11    semaglutide (OZEMPIC) 0.25 mg or 0.5 mg(2 mg/1.5 mL) pen injector, Inject 0.25 mg into the skin every 7 days., Disp: 1 pen, Rfl: 2    topiramate (TOPAMAX) 100 MG tablet, TAKE 1 TABLET(100 MG) BY MOUTH TWICE DAILY, Disp: 60 tablet, Rfl: 5    venlafaxine (EFFEXOR-XR) 150 MG Cp24, Take 150 mg by mouth once daily., Disp: , Rfl:       PHYSICAL EXAMINATION  There were no vitals filed for this visit.    General: The patient is a well-developed, well-nourished looking of stated age in no acute distress.  MENTAL: The patient is awake, alert and oriented times to time, place, location and situation. Intact recent memory, attention, concentration. Language and speech are normal. No aphasia, no dysarthria    Impression:  Migraine without aura - high frequency episodic- she had been doing very well on  mg bid , but after getting COVID infection on 1/22 she has had slowly worsening of headache, now having more frequent attacks, they are longer and not responding as well to acute medication. Neurological examination revealed allodynia in Left V2, but no neurological deficits. Given the increase in attacks and the burning in the V1/v2 distribution with severe attacks will go ahead and  get neuroimaging studies. Since starting CGRP Mab Ajovy 225mg monthly she has had after third month she has had more improvement 1 migraine attack and 3 mild headache. At this time we will continue current regimen. Eletriptan not effective, ubrelvy 50mg works quickly and resolves attack with one dose. Will send refill of this. Discussed topiramate options, not sure she would like to wean of yet. Discussed waiting a few more months of Ajovy, we can have her when she is ready wean off slowly topiramate.     Comorbidities:  IBS -C - on Linzess    PCOS - s/p hysterectomy partial    RA, fibromyalgia  - on Enbrel , followed by rheumatology   childhood seizures - has not had seizure since childhood   GERD   anxiety , depression - doing well on current regimen, anxiety and stress has increased secondary to current fostering child situation   asthma     Plan:   1- For preventive management: A. Continue Ajovy 225mg once a month              B. For now continue Topiramate 100mg twice a day --> in the future , if things going well after a few more doses of Ajovy she can reduce topiramate by 50mg every 2 weeks, until 50mg twice a day, then at that point go down by 25mg every 1-2 weeks, until off. If as weaning down has recurrence or worsening go to the lowest effective dose.     2- Acute management: Discontinue eletriptan 40mg     At onset of attack Ubrelvy 50mg at onset , can repeat after 2 hours. Max 2/day.     For nausea: add zofran ODT 4mg     3- Continue your other mediation propranolol LA 80mg and venlafaxine XR daily     4- keep headache diary     RTC in 3 months with a headache clinic colleague upon my departure from [facility]. She expressed understanding and is in agreement with our plan.         Eneida Vogel MD   Board Certified Neurologist  [facility] Certified Headache Medicine     I spent 32 minutes on day of this encounter preparing, treating, and managing this patient with migraine without aura, fibromyalgia,   IBS -C, PCOS, RA, childhood seizures, GERD, anxiety , depression, asthma

## 2022-09-21 ENCOUNTER — PATIENT MESSAGE (OUTPATIENT)
Dept: FAMILY MEDICINE | Facility: CLINIC | Age: 42
End: 2022-09-21
Payer: COMMERCIAL

## 2022-09-21 ENCOUNTER — TELEPHONE (OUTPATIENT)
Dept: FAMILY MEDICINE | Facility: CLINIC | Age: 42
End: 2022-09-21
Payer: COMMERCIAL

## 2022-09-21 NOTE — TELEPHONE ENCOUNTER
----- Message from Bel Bello sent at 9/21/2022  2:45 PM CDT -----  Contact: Self  Type:  Same Day Appointment Request    Caller is requesting a same day appointment.  Caller declined first available appointment listed below.      Name of Caller:  Patient  When is the first available appointment?  9/23  Symptoms:  R foot hurting her  Best Call Back Number:  [phone]  Additional Information:   Pt is looking to come in and get an xray done if possible, stating if she can't get in can we put in an order to find out why it's not getting better and what is wrong. Thank You.

## 2022-09-21 NOTE — TELEPHONE ENCOUNTER
----- Message from Bel Bello sent at 9/21/2022  2:45 PM CDT -----  Contact: Self  Type:  Same Day Appointment Request    Caller is requesting a same day appointment.  Caller declined first available appointment listed below.      Name of Caller:  Patient  When is the first available appointment?  9/23  Symptoms:  R foot hurting her  Best Call Back Number:  [phone]  Additional Information:   Pt is looking to come in and get an xray done if possible, stating if she can't get in can we put in an order to find out why it's not getting better and what is wrong. Thank You.

## 2022-09-22 ENCOUNTER — OFFICE VISIT (OUTPATIENT)
Dept: FAMILY MEDICINE | Facility: CLINIC | Age: 42
End: 2022-09-22
Payer: COMMERCIAL

## 2022-09-22 ENCOUNTER — OFFICE VISIT (OUTPATIENT)
Dept: GASTROENTEROLOGY | Facility: CLINIC | Age: 42
End: 2022-09-22
Payer: COMMERCIAL

## 2022-09-22 ENCOUNTER — HOSPITAL ENCOUNTER (OUTPATIENT)
Dept: RADIOLOGY | Facility: HOSPITAL | Age: 42
Discharge: HOME | End: 2022-09-22
Attending: FAMILY MEDICINE
Payer: COMMERCIAL

## 2022-09-22 VITALS
OXYGEN SATURATION: 99 % | DIASTOLIC BLOOD PRESSURE: 76 MMHG | HEIGHT: 68 IN | WEIGHT: 215.81 LBS | HEART RATE: 82 BPM | BODY MASS INDEX: 32.71 KG/M2 | SYSTOLIC BLOOD PRESSURE: 114 MMHG

## 2022-09-22 VITALS — BODY MASS INDEX: 32.71 KG/M2 | WEIGHT: 215.81 LBS | HEIGHT: 68 IN

## 2022-09-22 DIAGNOSIS — R10.9 ABDOMINAL PAIN, UNSPECIFIED ABDOMINAL LOCATION: ICD-10-CM

## 2022-09-22 DIAGNOSIS — M25.572 ACUTE LEFT ANKLE PAIN: Primary | ICD-10-CM

## 2022-09-22 DIAGNOSIS — M25.572 ACUTE LEFT ANKLE PAIN: ICD-10-CM

## 2022-09-22 PROCEDURE — 1159F MED LIST DOCD IN RCRD: CPT | Mod: CPTII,S$GLB,, | Performed by: INTERNAL MEDICINE

## 2022-09-22 PROCEDURE — 3078F DIAST BP <80 MM HG: CPT | Mod: CPTII,S$GLB,, | Performed by: FAMILY MEDICINE

## 2022-09-22 PROCEDURE — 99999 PR PBB SHADOW E&M-EST. PATIENT-LVL V: ICD-10-PCS | Mod: PBBFAC,,, | Performed by: FAMILY MEDICINE

## 2022-09-22 PROCEDURE — 1159F MED LIST DOCD IN RCRD: CPT | Mod: CPTII,S$GLB,, | Performed by: FAMILY MEDICINE

## 2022-09-22 PROCEDURE — 1160F PR REVIEW ALL MEDS BY PRESCRIBER/CLIN PHARMACIST DOCUMENTED: ICD-10-PCS | Mod: CPTII,S$GLB,, | Performed by: FAMILY MEDICINE

## 2022-09-22 PROCEDURE — 3008F PR BODY MASS INDEX (BMI) DOCUMENTED: ICD-10-PCS | Mod: CPTII,S$GLB,, | Performed by: INTERNAL MEDICINE

## 2022-09-22 PROCEDURE — 99204 OFFICE O/P NEW MOD 45 MIN: CPT | Mod: S$GLB,,, | Performed by: INTERNAL MEDICINE

## 2022-09-22 PROCEDURE — 3008F BODY MASS INDEX DOCD: CPT | Mod: CPTII,S$GLB,, | Performed by: FAMILY MEDICINE

## 2022-09-22 PROCEDURE — 1160F RVW MEDS BY RX/DR IN RCRD: CPT | Mod: CPTII,S$GLB,, | Performed by: FAMILY MEDICINE

## 2022-09-22 PROCEDURE — 3074F SYST BP LT 130 MM HG: CPT | Mod: CPTII,S$GLB,, | Performed by: FAMILY MEDICINE

## 2022-09-22 PROCEDURE — 99214 PR OFFICE/OUTPT VISIT, EST, LEVL IV, 30-39 MIN: ICD-10-PCS | Mod: S$GLB,,, | Performed by: FAMILY MEDICINE

## 2022-09-22 PROCEDURE — 99999 PR PBB SHADOW E&M-EST. PATIENT-LVL IV: ICD-10-PCS | Mod: PBBFAC,,, | Performed by: INTERNAL MEDICINE

## 2022-09-22 PROCEDURE — 1159F PR MEDICATION LIST DOCUMENTED IN MEDICAL RECORD: ICD-10-PCS | Mod: CPTII,S$GLB,, | Performed by: FAMILY MEDICINE

## 2022-09-22 PROCEDURE — 73610 XR ANKLE COMPLETE 3 VIEW LEFT: ICD-10-PCS | Mod: 26,LT,, | Performed by: RADIOLOGY

## 2022-09-22 PROCEDURE — 3008F PR BODY MASS INDEX (BMI) DOCUMENTED: ICD-10-PCS | Mod: CPTII,S$GLB,, | Performed by: FAMILY MEDICINE

## 2022-09-22 PROCEDURE — 1159F PR MEDICATION LIST DOCUMENTED IN MEDICAL RECORD: ICD-10-PCS | Mod: CPTII,S$GLB,, | Performed by: INTERNAL MEDICINE

## 2022-09-22 PROCEDURE — 3074F PR MOST RECENT SYSTOLIC BLOOD PRESSURE < 130 MM HG: ICD-10-PCS | Mod: CPTII,S$GLB,, | Performed by: FAMILY MEDICINE

## 2022-09-22 PROCEDURE — 73610 X-RAY EXAM OF ANKLE: CPT | Mod: 26,LT,, | Performed by: RADIOLOGY

## 2022-09-22 PROCEDURE — 3008F BODY MASS INDEX DOCD: CPT | Mod: CPTII,S$GLB,, | Performed by: INTERNAL MEDICINE

## 2022-09-22 PROCEDURE — 99204 PR OFFICE/OUTPT VISIT, NEW, LEVL IV, 45-59 MIN: ICD-10-PCS | Mod: S$GLB,,, | Performed by: INTERNAL MEDICINE

## 2022-09-22 PROCEDURE — 99999 PR PBB SHADOW E&M-EST. PATIENT-LVL IV: CPT | Mod: PBBFAC,,, | Performed by: INTERNAL MEDICINE

## 2022-09-22 PROCEDURE — 73610 X-RAY EXAM OF ANKLE: CPT | Mod: TC,FY,PO,LT

## 2022-09-22 PROCEDURE — 99214 OFFICE O/P EST MOD 30 MIN: CPT | Mod: S$GLB,,, | Performed by: FAMILY MEDICINE

## 2022-09-22 PROCEDURE — 3078F PR MOST RECENT DIASTOLIC BLOOD PRESSURE < 80 MM HG: ICD-10-PCS | Mod: CPTII,S$GLB,, | Performed by: FAMILY MEDICINE

## 2022-09-22 PROCEDURE — 99999 PR PBB SHADOW E&M-EST. PATIENT-LVL V: CPT | Mod: PBBFAC,,, | Performed by: FAMILY MEDICINE

## 2022-09-22 RX ORDER — PANTOPRAZOLE SODIUM 40 MG/1
1 TABLET, DELAYED RELEASE ORAL
COMMUNITY
End: 2022-09-22 | Stop reason: SDUPTHER

## 2022-09-22 RX ORDER — BUPROPION HYDROCHLORIDE 150 MG/1
150 TABLET ORAL EVERY MORNING
COMMUNITY
Start: 2022-08-17 | End: 2023-04-05

## 2022-09-22 RX ORDER — CLONAZEPAM 0.5 MG/1
1 TABLET ORAL
COMMUNITY
Start: 2022-08-17 | End: 2022-09-22 | Stop reason: SDUPTHER

## 2022-09-22 RX ORDER — VENLAFAXINE HYDROCHLORIDE 150 MG/1
CAPSULE, EXTENDED RELEASE ORAL
COMMUNITY
End: 2022-09-22 | Stop reason: SDUPTHER

## 2022-09-22 NOTE — PROGRESS NOTES
"Subjective:       Patient ID: Pura Gudino is a 42 y.o. female.    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  No history of trauma.  Does not have obvious pes planus.  Circulation looks good toes are pink.  Pulses are full of.  Extra be ordered and podiatry referral some placed for urgent.    Review of Systems   Constitutional: Negative.    HENT: Negative.     Eyes: Negative.    Respiratory: Negative.     Cardiovascular: Negative.    Gastrointestinal: Negative.    Endocrine: Negative.    Genitourinary: Negative.    Musculoskeletal: Negative.    Skin: Negative.    Allergic/Immunologic: Negative.    Neurological: Negative.    Hematological: Negative.    Psychiatric/Behavioral: Negative.       Objective:      Vitals:    09/22/22 1307   BP: 114/76   Pulse: 82   SpO2: 99%   Weight: 97.9 kg (215 lb 13.3 oz)   Height: 5' 8" (1.727 m)      Physical Exam  Vitals and nursing note reviewed.   Constitutional:       Appearance: Normal appearance. She is normal weight.   HENT:      Head: Normocephalic and atraumatic.      Nose: Nose normal.      Mouth/Throat:      Mouth: Mucous membranes are moist.      Pharynx: Oropharynx is clear.   Eyes:      Extraocular Movements: Extraocular movements intact.      Conjunctiva/sclera: Conjunctivae normal.      Pupils: Pupils are equal, round, and reactive to light.   Cardiovascular:      Rate and Rhythm: Normal rate and regular rhythm.      Pulses: Normal pulses.      Heart sounds: Normal heart sounds.   Pulmonary:      Effort: Pulmonary effort is normal.      Breath sounds: Normal breath sounds.   Abdominal:      General: Abdomen is flat. Bowel sounds are normal.      Palpations: Abdomen is soft.   Musculoskeletal:         General: Normal range of motion.      Cervical back: Normal range of motion and neck supple. "   Skin:     General: Skin is warm and dry.      Capillary Refill: Capillary refill takes less than 2 seconds.   Neurological:      General: No focal deficit present.      Mental Status: She is alert and oriented to person, place, and time. Mental status is at baseline.   Psychiatric:         Mood and Affect: Mood normal.         Behavior: Behavior normal.         Thought Content: Thought content normal.         Judgment: Judgment normal.       Results for orders placed or performed in visit on 06/10/22   Basic metabolic panel   Result Value Ref Range    Sodium 138 136 - 145 mmol/L    Potassium 4.8 3.5 - 5.1 mmol/L    Chloride 113 (H) 95 - 110 mmol/L    CO2 17 (L) 23 - 29 mmol/L    Glucose 90 70 - 110 mg/dL    BUN 9 6 - 20 mg/dL    Creatinine 0.8 0.5 - 1.4 mg/dL    Calcium 9.5 8.7 - 10.5 mg/dL    Anion Gap 8 8 - 16 mmol/L    eGFR if African American >60.0 >60 mL/min/1.73 m^2    eGFR if non African American >60.0 >60 mL/min/1.73 m^2      Assessment:       1. Acute left ankle pain        Plan:       Acute left ankle pain  -     X-Ray Ankle Complete Left; Future; Expected date: 09/22/2022  -     Ambulatory referral/consult to Podiatry; Future; Expected date: 09/29/2022        Medication List with Changes/Refills   Current Medications    ALBUTEROL (PROVENTIL/VENTOLIN HFA) 90 MCG/ACTUATION INHALER    Inhale 2 puffs into the lungs every 4 (four) hours as needed for Wheezing or Shortness of Breath. Rescue    BUPROPION (WELLBUTRIN XL) 150 MG TB24 TABLET    Take 150 mg by mouth every morning.    CLOBETASOL (TEMOVATE) 0.05 % EXTERNAL SOLUTION    Apply topically 2 (two) times daily. Apply to the scalp    CLONAZEPAM (KLONOPIN) 0.5 MG TABLET    Take 0.5 mg by mouth daily as needed.    ENBREL SURECLICK 50 MG/ML (0.98 ML) PNIJ        EPICERAM VINOD    Apply topically 2 (two) times daily.    FLUTICASONE PROPIONATE (FLONASE) 50 MCG/ACTUATION NASAL SPRAY    SHAKE LIQUID AND USE 2 SPRAYS(100 MCG) IN EACH NOSTRIL EVERY DAY FOR 14 DAYS     FLUTICASONE-SALMETEROL DISKUS INHALER 250-50 MCG    Inhale 1 puff into the lungs 2 (two) times daily. Controller    FREMANEZUMAB-VFRM (AJOVY AUTOINJECTOR) 225 MG/1.5 ML AUTOINJECTOR    Inject 1.5 mLs (225 mg total) into the skin every 30 days.    PANTOPRAZOLE (PROTONIX) 40 MG TABLET    TAKE 1 TABLET BY MOUTH DAILY    PLECANATIDE (TRULANCE) 3 MG TAB    Take 3 mg by mouth Daily.    PROPRANOLOL (INDERAL LA) 80 MG 24 HR CAPSULE    TAKE 1 CAPSULE(80 MG) BY MOUTH EVERY NIGHT    TOPIRAMATE (TOPAMAX) 100 MG TABLET    TAKE 1 TABLET(100 MG) BY MOUTH TWICE DAILY    VENLAFAXINE (EFFEXOR-XR) 150 MG CP24    Take 150 mg by mouth once daily.   Discontinued Medications    ALPRAZOLAM (XANAX) 0.5 MG TABLET    Take 0.5 mg by mouth daily as needed.    CLONAZEPAM (KLONOPIN) 0.5 MG TABLET    1 tablet.    ELETRIPTAN (RELPAX) 40 MG TABLET    Take 1 tablet (40 mg total) by mouth as needed (migraine). may repeat in 2 hours if necessary. Max is 2/day.    PANTOPRAZOLE (PROTONIX) 40 MG TABLET    1 tablet.    SEMAGLUTIDE (OZEMPIC) 0.25 MG OR 0.5 MG(2 MG/1.5 ML) PEN INJECTOR    Inject 0.25 mg into the skin every 7 days.    VENLAFAXINE (EFFEXOR-XR) 150 MG CP24    1 capsule with food

## 2022-09-22 NOTE — PROGRESS NOTES
CC: constipation    HPI 42 y.o. female with history of RA on Enbrel who is here for evaluation of chronic, idiopathic constipation associated with occasional rectal bleeding, mainly when straining. She denies any significant abdominal pain. She states she has tried Ozempic in past for weight losswhich caused significant constipation. She was on Linzess at the time which did not help. Now she is on Trulance which has helped constipation but she is off Ozempic. Reports also noticed lumps around anus and area between vagina and anus ongoing for years. She does endorse bleeding with bright red blood seen mainly on toilet paper which she attributed to hemorrhoids. She uses witch hazel pads which help. Currently takes Trulance daily and has one BM daily to every other day. She has never had colonoscopy. No family history of colon cancer.    Medical records reviewed. Additional history supplemented by nursing.     Past Medical History:   Diagnosis Date    Allergy     Anemia     Asthma     Chronic diarrhea     Elevated C-reactive protein (CRP)     Fibromyalgia     GERD (gastroesophageal reflux disease)     Headache(784.0)     Irritable bowel syndrome     Mental disorder     Bipolar Disorder, Anxiety, Depression    Motion sickness     Motion sickness     PCOS (polycystic ovarian syndrome)     Rheumatoid arthritis     Seizures     childhood     Past Surgical History:   Procedure Laterality Date    HYSTERECTOMY      LASER LAPAROSCOPY      left hip surgery had hardware replaced then removed      SINUS SURGERY       Social History  Social History     Tobacco Use    Smoking status: Never    Smokeless tobacco: Never   Substance Use Topics    Alcohol use: No    Drug use: No     Family History   Problem Relation Age of Onset    Rheum arthritis Maternal Grandmother     Bipolar disorder Other     Cataracts Mother     Cataracts Maternal Grandfather     Asthma Maternal Aunt     Alcohol abuse Maternal Aunt     Depression Maternal Aunt   "   Diabetes Mellitus Maternal Aunt     Heart disease Maternal Aunt     Alcohol abuse Maternal Uncle     Bipolar disorder Maternal Uncle     Heart failure Maternal Uncle     Breast cancer Neg Hx     Ovarian cancer Neg Hx     Migraines Neg Hx      Review of Systems  General ROS: negative for chills, fever or weight loss  Psychological ROS: negative for hallucination, depression or suicidal ideation  Ophthalmic ROS: negative for blurry vision, photophobia or eye pain  ENT ROS: negative for epistaxis, sore throat or rhinorrhea  Respiratory ROS: no cough, shortness of breath, or wheezing  Cardiovascular ROS: no chest pain or dyspnea on exertion  Gastrointestinal ROS: +occasional constipation, +occ rectal bleeding, no abdominal pain, +change in bowel habits  Genito-Urinary ROS: no dysuria, trouble voiding, or hematuria  Musculoskeletal ROS: negative for gait disturbance or muscular weakness  Neurological ROS: no syncope or seizures; no ataxia  Dermatological ROS: negative for pruritis, rash and jaundice    Physical Examination  Ht 5' 8" (1.727 m)   Wt 97.9 kg (215 lb 13.3 oz)   LMP 05/23/2018   BMI 32.82 kg/m²   General appearance: alert, cooperative, no distress  HENT: Normocephalic, atraumatic, neck symmetrical  Eyes: conjunctivae clear  Lungs: No labored breathing  Skin: No jaundice  Neurologic: Alert and oriented X 3  Psychiatric: no pressured speech; flat affect; no evidence of impaired cognition     Labs:  Lab Results   Component Value Date    WBC 11.21 02/28/2022    HGB 12.6 02/28/2022    HCT 40.8 02/28/2022    MCV 88 02/28/2022     02/28/2022     CMP  Sodium   Date Value Ref Range Status   06/10/2022 138 136 - 145 mmol/L Final     Potassium   Date Value Ref Range Status   06/10/2022 4.8 3.5 - 5.1 mmol/L Final     Comment:     *Result may be interfered by visible hemolysis     Chloride   Date Value Ref Range Status   06/10/2022 113 (H) 95 - 110 mmol/L Final     CO2   Date Value Ref Range Status "   06/10/2022 17 (L) 23 - 29 mmol/L Final     Glucose   Date Value Ref Range Status   06/10/2022 90 70 - 110 mg/dL Final     BUN   Date Value Ref Range Status   06/10/2022 9 6 - 20 mg/dL Final     Creatinine   Date Value Ref Range Status   06/10/2022 0.8 0.5 - 1.4 mg/dL Final     Calcium   Date Value Ref Range Status   06/10/2022 9.5 8.7 - 10.5 mg/dL Final     Total Protein   Date Value Ref Range Status   02/28/2022 7.8 6.0 - 8.4 g/dL Final     Albumin   Date Value Ref Range Status   02/28/2022 3.6 3.5 - 5.2 g/dL Final     Total Bilirubin   Date Value Ref Range Status   02/28/2022 0.4 0.1 - 1.0 mg/dL Final     Comment:     For infants and newborns, interpretation of results should be based  on gestational age, weight and in agreement with clinical  observations.    Premature Infant recommended reference ranges:  Up to 24 hours.............<8.0 mg/dL  Up to 48 hours............<12.0 mg/dL  3-5 days..................<15.0 mg/dL  6-29 days.................<15.0 mg/dL       Alkaline Phosphatase   Date Value Ref Range Status   02/28/2022 111 55 - 135 U/L Final     AST   Date Value Ref Range Status   02/28/2022 19 10 - 40 U/L Final     ALT   Date Value Ref Range Status   02/28/2022 16 10 - 44 U/L Final     Anion Gap   Date Value Ref Range Status   06/10/2022 8 8 - 16 mmol/L Final     eGFR if    Date Value Ref Range Status   06/10/2022 >60.0 >60 mL/min/1.73 m^2 Final     eGFR if non    Date Value Ref Range Status   06/10/2022 >60.0 >60 mL/min/1.73 m^2 Final     Comment:     Calculation used to obtain the estimated glomerular filtration  rate (eGFR) is the CKD-EPI equation.        Imaging:  CXR:  Impression:     Negative chest.  No significant change.     Independently reviewed    Assessment:   Chronic constipation  2.   Rectal bleeding  3.   History of RA on Enbrel  4.   History of Ozempic use which caused constipation    Plan:   -Colonoscopy for evaluation due to history of  constipation  -Encouraged that she continue Trulance daily for now  -If symptoms are stable, she can consider restarting Ozempic and if symptoms of constipation worsen while on medication can try stimulant laxatives and dulcolax suppositories     45 minutes of total time spent on the encounter, which includes face to face time and non-face to face time preparing to see the patient (eg, review of tests), obtaining and/or reviewing separately obtained history, documenting clinical information in the electronic or other health record, Independently interpreting results (not separately reported) and communicating results to the patient/family/caregiver, or care coordination (not separately reported).          Xiang Little MD  [facility]  [address] [place] [address]  Office: [phone]  Fax: [phone]

## 2022-10-17 ENCOUNTER — PATIENT MESSAGE (OUTPATIENT)
Dept: ADMINISTRATIVE | Facility: HOSPITAL | Age: 42
End: 2022-10-17
Payer: COMMERCIAL

## 2022-10-18 ENCOUNTER — PATIENT MESSAGE (OUTPATIENT)
Dept: NEUROLOGY | Facility: CLINIC | Age: 42
End: 2022-10-18
Payer: COMMERCIAL

## 2022-10-24 ENCOUNTER — TELEPHONE (OUTPATIENT)
Dept: GASTROENTEROLOGY | Facility: CLINIC | Age: 42
End: 2022-10-24
Payer: COMMERCIAL

## 2022-10-24 NOTE — TELEPHONE ENCOUNTER
Returned call to pt. Pt asking to reschedule procedure. Advised pt she would need to see another provider as Dr. FISHER would be on maternity leave. Pt states  said she worked with another female and asked to be scheduled with Dr. Lopez. Pt rescheduled to [place] with Dr. Parker. Pt verbalized understanding to new date and location

## 2022-10-24 NOTE — TELEPHONE ENCOUNTER
----- Message from Mary Smith sent at 10/24/2022  1:25 PM CDT -----  Contact: patient  Type: Needs Medical Advice  Who Called:  patient  Best Call Back Number: [phone] (home)   Additional Information: patient needs to reschedule colonoscopy

## 2022-11-21 ENCOUNTER — IMMUNIZATION (OUTPATIENT)
Dept: PRIMARY CARE CLINIC | Facility: CLINIC | Age: 42
End: 2022-11-21
Payer: COMMERCIAL

## 2022-11-21 DIAGNOSIS — Z23 NEED FOR VACCINATION: Primary | ICD-10-CM

## 2022-11-21 PROCEDURE — 0134A COVID-19, MRNA, LNP-S, BIVALENT BOOSTER, PF, 50 MCG/0.5 ML: CPT | Mod: CV19,PBBFAC | Performed by: INTERNAL MEDICINE

## 2022-11-21 PROCEDURE — 91313 COVID-19, MRNA, LNP-S, BIVALENT BOOSTER, PF, 50 MCG/0.5 ML: CPT | Mod: PBBFAC | Performed by: INTERNAL MEDICINE

## 2022-12-12 ENCOUNTER — PATIENT MESSAGE (OUTPATIENT)
Dept: FAMILY MEDICINE | Facility: CLINIC | Age: 42
End: 2022-12-12
Payer: COMMERCIAL

## 2022-12-19 ENCOUNTER — PATIENT MESSAGE (OUTPATIENT)
Dept: ENDOSCOPY | Facility: HOSPITAL | Age: 42
End: 2022-12-19
Payer: COMMERCIAL

## 2022-12-21 ENCOUNTER — ANESTHESIA EVENT (OUTPATIENT)
Dept: ENDOSCOPY | Facility: HOSPITAL | Age: 42
End: 2022-12-21
Payer: COMMERCIAL

## 2022-12-21 ENCOUNTER — HOSPITAL ENCOUNTER (OUTPATIENT)
Facility: HOSPITAL | Age: 42
Discharge: HOME | End: 2022-12-21
Attending: INTERNAL MEDICINE | Admitting: INTERNAL MEDICINE
Payer: COMMERCIAL

## 2022-12-21 ENCOUNTER — ANESTHESIA (OUTPATIENT)
Dept: ENDOSCOPY | Facility: HOSPITAL | Age: 42
End: 2022-12-21
Payer: COMMERCIAL

## 2022-12-21 DIAGNOSIS — K59.00 CONSTIPATION, UNSPECIFIED: ICD-10-CM

## 2022-12-21 PROCEDURE — D9220A PRA ANESTHESIA: Mod: CRNA,,, | Performed by: NURSE ANESTHETIST, CERTIFIED REGISTERED

## 2022-12-21 PROCEDURE — 25000003 PHARM REV CODE 250: Performed by: NURSE ANESTHETIST, CERTIFIED REGISTERED

## 2022-12-21 PROCEDURE — 88305 TISSUE EXAM BY PATHOLOGIST: CPT | Performed by: PATHOLOGY

## 2022-12-21 PROCEDURE — 88305 TISSUE EXAM BY PATHOLOGIST: CPT | Mod: 26,,, | Performed by: PATHOLOGY

## 2022-12-21 PROCEDURE — 25000003 PHARM REV CODE 250: Performed by: INTERNAL MEDICINE

## 2022-12-21 PROCEDURE — 37000008 HC ANESTHESIA 1ST 15 MINUTES: Performed by: INTERNAL MEDICINE

## 2022-12-21 PROCEDURE — D9220A PRA ANESTHESIA: Mod: ANES,,, | Performed by: ANESTHESIOLOGY

## 2022-12-21 PROCEDURE — 88305 TISSUE EXAM BY PATHOLOGIST: ICD-10-PCS | Mod: 26,,, | Performed by: PATHOLOGY

## 2022-12-21 PROCEDURE — D9220A PRA ANESTHESIA: ICD-10-PCS | Mod: ANES,,, | Performed by: ANESTHESIOLOGY

## 2022-12-21 PROCEDURE — 45385 COLONOSCOPY W/LESION REMOVAL: CPT | Performed by: INTERNAL MEDICINE

## 2022-12-21 PROCEDURE — 63600175 PHARM REV CODE 636 W HCPCS: Performed by: NURSE ANESTHETIST, CERTIFIED REGISTERED

## 2022-12-21 PROCEDURE — 37000009 HC ANESTHESIA EA ADD 15 MINS: Performed by: INTERNAL MEDICINE

## 2022-12-21 PROCEDURE — D9220A PRA ANESTHESIA: ICD-10-PCS | Mod: CRNA,,, | Performed by: NURSE ANESTHETIST, CERTIFIED REGISTERED

## 2022-12-21 PROCEDURE — 45385 PR COLONOSCOPY,REMV LESN,SNARE: ICD-10-PCS | Mod: ,,, | Performed by: INTERNAL MEDICINE

## 2022-12-21 PROCEDURE — 45385 COLONOSCOPY W/LESION REMOVAL: CPT | Mod: ,,, | Performed by: INTERNAL MEDICINE

## 2022-12-21 PROCEDURE — 27201089 HC SNARE, DISP (ANY): Performed by: INTERNAL MEDICINE

## 2022-12-21 RX ORDER — LIDOCAINE HCL/PF 100 MG/5ML
SYRINGE (ML) INTRAVENOUS
Status: DISCONTINUED | OUTPATIENT
Start: 2022-12-21 | End: 2022-12-21

## 2022-12-21 RX ORDER — PROPOFOL 10 MG/ML
VIAL (ML) INTRAVENOUS
Status: DISCONTINUED | OUTPATIENT
Start: 2022-12-21 | End: 2022-12-21

## 2022-12-21 RX ORDER — SODIUM CHLORIDE 9 MG/ML
INJECTION, SOLUTION INTRAVENOUS CONTINUOUS
Status: DISCONTINUED | OUTPATIENT
Start: 2022-12-21 | End: 2022-12-21 | Stop reason: HOSPADM

## 2022-12-21 RX ADMIN — PROPOFOL 80 MG: 10 INJECTION, EMULSION INTRAVENOUS at 11:12

## 2022-12-21 RX ADMIN — SODIUM CHLORIDE: 9 INJECTION, SOLUTION INTRAVENOUS at 10:12

## 2022-12-21 RX ADMIN — PROPOFOL 40 MG: 10 INJECTION, EMULSION INTRAVENOUS at 11:12

## 2022-12-21 RX ADMIN — LIDOCAINE HYDROCHLORIDE 50 MG: 20 INJECTION INTRAVENOUS at 11:12

## 2022-12-21 NOTE — DISCHARGE INSTRUCTIONS
"Discharge Instructions: After Your Surgery/Procedure  Youve just had surgery. During surgery you were given medicine called anesthesia to keep you relaxed and free of pain. After surgery you may have some pain or nausea. This is common. Here are some tips for feeling better and getting well after surgery.     Stay on schedule with your medication.   Going home  Your doctor or nurse will show you how to take care of yourself when you go home. He or she will also answer your questions. Have an adult family member or friend drive you home.      For your safety we recommend these precaution for the first 24 hours after your procedure:  Do not drive or use heavy equipment.  Do not make important decisions or sign legal papers.  Do not drink alcohol.  Have someone stay with you, if needed. He or she can watch for problems and help keep you safe.  Your concentration, balance, coordination, and judgement may be impaired for many hours after anesthesia.  Use caution when ambulating or standing up.     You may feel weak and "washed out" after anesthesia and surgery.      Subtle residual effects of general anesthesia or sedation with regional / local anesthesia can last more than 24 hours.  Rest for the remainder of the day or longer if your Doctor/Surgeon has advised you to do so.  Although you may feel normal within the first 24 hours, your reflexes and mental ability may be impaired without you realizing it.  You may feel dizzy, lightheaded or sleepy for 24 hours or longer.      Be sure to go to all follow-up visits with your doctor. And rest after your surgery for as long as your doctor tells you to.  Coping with pain  If you have pain after surgery, pain medicine will help you feel better. Take it as told, before pain becomes severe. Also, ask your doctor or pharmacist about other ways to control pain. This might be with heat, ice, or relaxation. And follow any other instructions your surgeon or nurse gives you.  Tips " for taking pain medicine  To get the best relief possible, remember these points:  Pain medicines can upset your stomach. Taking them with a little food may help.  Most pain relievers taken by mouth need at least 20 to 30 minutes to start to work.  Taking medicine on a schedule can help you remember to take it. Try to time your medicine so that you can take it before starting an activity. This might be before you get dressed, go for a walk, or sit down for dinner.  Constipation is a common side effect of pain medicines. Call your doctor before taking any medicines such as laxatives or stool softeners to help ease constipation. Also ask if you should skip any foods. Drinking lots of fluids and eating foods such as fruits and vegetables that are high in fiber can also help. Remember, do not take laxatives unless your surgeon has prescribed them.  Drinking alcohol and taking pain medicine can cause dizziness and slow your breathing. It can even be deadly. Do not drink alcohol while taking pain medicine.  Pain medicine can make you react more slowly to things. Do not drive or run machinery while taking pain medicine.  Your health care provider may tell you to take acetaminophen to help ease your pain. Ask him or her how much you are supposed to take each day. Acetaminophen or other pain relievers may interact with your prescription medicines or other over-the-counter (OTC) drugs. Some prescription medicines have acetaminophen and other ingredients. Using both prescription and OTC acetaminophen for pain can cause you to overdose. Read the labels on your OTC medicines with care. This will help you to clearly know the list of ingredients, how much to take, and any warnings. It may also help you not take too much acetaminophen. If you have questions or do not understand the information, ask your pharmacist or health care provider to explain it to you before you take the OTC medicine.  Managing nausea  Some people have an  upset stomach after surgery. This is often because of anesthesia, pain, or pain medicine, or the stress of surgery. These tips will help you handle nausea and eat healthy foods as you get better. If you were on a special food plan before surgery, ask your doctor if you should follow it while you get better. These tips may help:  Do not push yourself to eat. Your body will tell you when to eat and how much.  Start off with clear liquids and soup. They are easier to digest.  Next try semi-solid foods, such as mashed potatoes, applesauce, and gelatin, as you feel ready.  Slowly move to solid foods. Dont eat fatty, rich, or spicy foods at first.  Do not force yourself to have 3 large meals a day. Instead eat smaller amounts more often.  Take pain medicines with a small amount of solid food, such as crackers or toast, to avoid nausea.     Call your surgeon if  You still have pain an hour after taking medicine. The medicine may not be strong enough.  You feel too sleepy, dizzy, or groggy. The medicine may be too strong.  You have side effects like nausea, vomiting, or skin changes, such as rash, itching, or hives.       If you have obstructive sleep apnea  You were given anesthesia medicine during surgery to keep you comfortable and free of pain. After surgery, you may have more apnea spells because of this medicine and other medicines you were given. The spells may last longer than usual.   At home:  Keep using the continuous positive airway pressure (CPAP) device when you sleep. Unless your health care provider tells you not to, use it when you sleep, day or night. CPAP is a common device used to treat obstructive sleep apnea.  Talk with your provider before taking any pain medicine, muscle relaxants, or sedatives. Your provider will tell you about the possible dangers of taking these medicines.  © 4899-5634 [employer]. [address] [place] [address], [place] [address]. All rights reserved. This information is  "not intended as a substitute for professional medical care. Always follow your healthcare professional's instructions. Colon Polyps   The Basics   Written by the doctors and editors at [facility]   What are colon polyps? -- Colon polyps are tiny growths that form on the inside of the large intestine (also known as the colon) (figure 1). Polyps are very common. About one-third to one-half of all adults have them by the time they are 50 years old. They do not usually cause symptoms. But some polyps can be or become cancer, so doctors sometimes remove them.  What are the symptoms of colon polyps? -- Colon polyps do not usually cause symptoms.  How do doctors find colon polyps? -- Doctors usually find colon polyps when they are doing screening tests to check for colon or rectal cancer. Cancer screening tests are tests that are done to try and find cancer early, before a person has symptoms. The screening tests for colon and rectal cancer include:  Colonoscopy - Before having a colonoscopy, you will get medicine to help you relax. Then a doctor will put a thin tube into your anus and advance it into your colon (figure 2). The tube has a camera attached to it, so the doctor can look inside your colon. The tube also has tools on the end, so the doctor can remove pieces of tissue, including polyps. After polyps are removed, they usually go to a lab to be tested for cancer and other problems.  Sigmoidoscopy - A sigmoidoscopy is very similar to a colonoscopy. The only difference is that this test looks only at the first part of the colon, and a colonoscopy looks at the whole colon.  CT colonography (also known as virtual colonoscopy) - For a virtual colonoscopy, you have a special kind of X-ray taken, called a "CT scan." This test creates pictures of the colon.  Stool test - "Stool" is another word for "bowel movements." Stool tests check for blood or abnormal genes in samples of stool. If a stool test indicates that something " "might be wrong with the colon, doctors usually follow up with a colonoscopy. Then doctors find polyps, if they are there.  Capsule colonoscopy - Rarely, your doctor might do something called a "capsule" colonoscopy. For this test, you swallow a special capsule that contains tiny wireless video cameras.   How are colon polyps treated? -- Doctors remove polyps using the same tools they use for a colonoscopy. They can remove polyps either by snipping them off with a special cutting tool, or by catching the polyps in a noose (figure 3). Most polyps can be removed during a colonoscopy. But sometimes, large polyps need to be removed at a later time, either with another colonoscopy or with surgery.  What happens after I have polyps removed? -- You might need to have a colonoscopy every few years to check for more polyps. In some people polyps come back. And if you had the kind of polyps that could become cancer, your doctor will want to remove them as they appear. Also, if the polyps you had removed were the kind that could become cancer, people in your family might need to be checked for polyps and colon cancer earlier than if you did not have polyps.  Depending on your situation, your doctor might suggest genetic testing. This can show if your polyps are related to a specific gene that runs in families. If this turns out to be the case, they might recommend other tests that can be done to prevent cancer or find it early.  Can colon polyps be prevented? -- To reduce your chances of getting polyps or colon cancer:  Eat a diet that is low in fat and high in fruits, vegetables, and fiber  Lose weight, if you are overweight  Do not smoke  Limit the amount of alcohol you drink  All topics are updated as new evidence becomes available and our peer review process is complete.  This topic retrieved from [employer] on: Sep 21, 2021.  Topic 67354 Version 8.0  Release: 29.4.2 - C29.263  © 2021 UpToDate, Inc. and/or its affiliates. All " rights reserved. Hemorrhoids   The Basics   Written by the doctors and editors at [facility]   What are hemorrhoids? -- Hemorrhoids are swollen veins in the rectum. They can cause itching, bleeding, and pain. Hemorrhoids are very common.  In some cases, you can see or feel hemorrhoids around the outside of the rectum. In other cases, you cannot see them because they are hidden inside the rectum (figure 1).  What are the symptoms of hemorrhoids? -- Hemorrhoids do not always cause symptoms. But when they do, symptoms can include:  Itching of the skin around the anus  Bleeding - Bleeding is usually painless. You might see bright red blood after using the toilet.  Pain - If a blood clot forms inside a hemorrhoid, this can cause pain. It can also cause a lump that you might be able to feel.  Should I see a doctor or nurse? -- You should see a doctor or nurse if you have any bleeding or if your bowel movements look like tar. Bleeding could be caused by something other than hemorrhoids, so you should have it checked out.  If you do have hemorrhoids, your doctor or nurse can suggest treatments. But there some steps you can try on you your own first.  What can I do to keep from getting more hemorrhoids? -- The most important thing you can do is to keep from getting constipated. You should have a bowel movement at least a few times a week. When you have a bowel movement, you also should not have to push too much. Plus, your bowel movements should not be too hard.  Being constipated and having hard bowel movements can make hemorrhoids worse. Here are some steps you can take to avoid getting constipated or having hard stools:  Eat lots of fruits, vegetables, and other foods with fiber (figure 2). Fiber helps to increase bowel movements.   You need 20 to 35 grams of fiber a day to keep your bowel movements regular (table 1). If you do not get enough fiber from your diet, you can take fiber supplements. These come in the form of  "powders, wafers, or pills. They include psyllium seed (sample brand names: Metamucil, Konsyl), methylcellulose (sample brand name: Citrucel), polycarbophil (sample brand name: FiberCon), and wheat dextrin (sample brand name: Benefiber). If you take a fiber supplement, be sure to read the label so you know how much to take. If you're not sure, ask your doctor nurse.  Take medicines called "stool softeners" such as docusate sodium (sample brand names: Colace, Dulcolax). These medicines increase the number of bowel movements you have. They are safe to take and they can prevent problems later.  What can I do to reduce my symptoms? -- Some people feel better if they soak their buttocks in 2 or 3 inches of warm water. You can do this up to 2 to 3 times a day for 10 to 15 minutes. Do not add soap, bubble bath, or anything to the water.  There are also medicines that you can get without a prescription (table 2). They are usually creams or ointments that you rub on your anus to relieve pain, itching, and swelling. Some hemorrhoid medicines come in a capsule (called a suppository) that you put inside your rectum. Others come in a cream that comes in a bottle with a nozzle that you put inside your rectum. It is OK to try these medicines. But do not use medicines that have hydrocortisone (a steroid medicine) for more than a week, unless your doctor or nurse approves.  What if the self-care steps do not work? -- If you still have symptoms after trying the steps listed above, you might need treatments to destroy or remove the hemorrhoids.  One popular treatment for hemorrhoids inside the rectum is called "rubber band ligation." For this treatment, the doctor ties tiny rubber bands around the hemorrhoids. A few days later the hemorrhoids shrink and fall off. Doctors can also use lasers, heat, or chemicals to destroy hemorrhoids. But if none of these options works, your doctor might suggest surgery to remove the hemorrhoids. " "Hemorrhoids on the outside of the rectum can only be removed with surgery.  All topics are updated as new evidence becomes available and our peer review process is complete.  This topic retrieved from [employer] on: Sep 21, 2021.  Topic 93202 Version 13.0  Release: 29.4.2 - C29.263  © 2021 UpToDate, Inc. and/or its affiliates. All rights reserved.  figure 1: Hemorrhoids     Hemorrhoids that are hidden inside the rectum are called "internal" hemorrhoids. You cannot see them, but they can cause symptoms. Hemorrhoids that you can see or feel are called "external" hemorrhoids.  Graphic 75182 Version 2.0    figure 2: Foods with fiber    High Fiber Diet   About this topic   Dietary fiber helps many illnesses. It can help you if you cannot have a bowel movement or if you have loose stools. Fiber can also lower your risk of diabetes and heart disease. Fiber can help with weight loss by helping you feel torrez after meals.  You can find fiber in fruits, vegetables, nuts and seeds, whole grains, and legumes. The fiber is the part of the plant food that your body cannot break down and absorb. It passes through your stomach, small bowel, colon, and out your body.  There are two kinds of fiber: Insoluble and soluble fiber. Insoluble fiber helps you pass foods through your digestive system. Insoluble fiber can help you with hard stools. Soluble fiber draws water in and turns it into a gel-like form making digestion slow down. Both are important.       What will the results be?   A high fiber diet can help you with bowel problems like stools that are too hard or too loose. It can also help prevent hemorrhoids and other colon problems. A high fiber diet can also help control your weight and lower blood sugar and cholesterol levels.  What changes to diet are needed?   The amount of fiber you need is based on your age, gender, and health.  Try to get 20 to 35 grams of fiber in your diet each day. Most people in the [place] only eat 15 grams " of fiber daily.  Drink at least 8 cups (1920 mL) of fluid each day.  When is this diet used?   Your doctor may talk with you about this diet if you have belly problems.  Who should use this diet?   Older children, young people, and adults can have this diet.   Who should not use this diet?   Some people should not use this diet. Check with your doctor if you have:  Diverticulitis  Active Crohn's disease  Ulcerative colitis  Bowel inflammation  Certain types of GI surgery  Talk to your child's doctor before starting your child on a high fiber diet.  What foods are good to eat?   To get the most from fiber in your diet, eat a wide variety of high fiber foods. Some examples are:  Vegetables like:  Spinach  Peas  Artichoke  Sweet potatoes with skin  Broccoli  Fruits like:  Raspberries  Blueberries  Blackberries  Apples with skin  Dried fruits  Grains like:  Oat bran  Barley  Whole wheat products  Wheat bran  Dried beans and nuts like:  Sunflower seeds  Almonds  Black beans  Chickpeas  What problems could happen?   Sudden increase of fiber intake can lead to gas, pain, fullness in your belly, and loose stools. Increase fiber gradually while drinking plenty of fluids.  Do not eat too much fiber. Your body will not take in vitamins and minerals as well if you eat too much fiber.  When do I need to call the doctor?   Health problem is not better or you are feeling worse.  Helpful tips   Start slow as you add more fiber to your diet. This may help prevent gas or cramps.  Try to eat the same amount of fiber each day. Aim to get your fiber from nutritious foods. Supplements do not offer the same benefits as food.  Read food labels with care to learn how much fiber is in the food you are eating.  If possible, do not peel fruits or vegetables before you eat them. Eating the peel gives you more fiber.  Where can I learn more?   Eat  Right  https://www.eatright.org/food/vitamins-and-supplements/types-of-vitamins-and-nutrients/easy-ways-to-boost-fiber-in-your-daily-diet   Last Reviewed Date   2021-09-23  Consumer Information Use and Disclaimer   This information is not specific medical advice and does not replace information you receive from your health care provider. This is only a brief summary of general information. It does NOT include all information about conditions, illnesses, injuries, tests, procedures, treatments, therapies, discharge instructions or life-style choices that may apply to you. You must talk with your health care provider for complete information about your health and treatment options. This information should not be used to decide whether or not to accept your health care providers advice, instructions or recommendations. Only your health care provider has the knowledge and training to provide advice that is right for you.  Copyright   Copyright © 2021 UpToDate, Inc. and its affiliates and/or licensors. All rights reserved.

## 2022-12-21 NOTE — ANESTHESIA PREPROCEDURE EVALUATION
12/21/2022  Pura Gudino is a 42 y.o., female.      Pre-op Assessment    I have reviewed the Patient Summary Reports.     I have reviewed the Nursing Notes. I have reviewed the NPO Status.   I have reviewed the Medications.     Review of Systems  Anesthesia Hx:  No problems with previous Anesthesia    Social:  Non-Smoker    Hematology/Oncology:         -- Anemia:   Cardiovascular:  Cardiovascular Normal     Pulmonary:   Asthma    Renal/:  Renal/ Normal     Hepatic/GI:   GERD    Musculoskeletal:   Arthritis     Neurological:   Neuromuscular Disease, Headaches Seizures    Psych:   Psychiatric History          Physical Exam  General: Well nourished, Cooperative, Alert and Oriented    Airway:  Mallampati: II   Mouth Opening: Normal  TM Distance: Normal  Neck ROM: Normal ROM    Dental:  Prominent Incisors        Anesthesia Plan  Type of Anesthesia, risks & benefits discussed:    Anesthesia Type: Gen Natural Airway  Intra-op Monitoring Plan: Standard ASA Monitors  Induction:  IV  Informed Consent: Informed consent signed with the Patient and all parties understand the risks and agree with anesthesia plan.  All questions answered.   ASA Score: 3  Day of Surgery Review of History & Physical: H&P Update referred to the surgeon/provider.    Ready For Surgery From Anesthesia Perspective.     .

## 2022-12-21 NOTE — H&P
[facility] Note    CC: Constipation    HPI 42 y.o. female presents for evaluation of constipation    Past Medical History:   Diagnosis Date    Allergy     Anemia     Asthma     Chronic diarrhea     Elevated C-reactive protein (CRP)     Fibromyalgia     GERD (gastroesophageal reflux disease)     Headache(784.0)     Irritable bowel syndrome     Mental disorder     Bipolar Disorder, Anxiety, Depression    Motion sickness     Motion sickness     PCOS (polycystic ovarian syndrome)     Rheumatoid arthritis     Seizures     childhood       Allergies and Medications reviewed     Review of Systems  General ROS: negative for - chills, fever or weight loss  Cardiovascular ROS: no chest pain or dyspnea on exertion  Gastrointestinal ROS: + constipation    Physical Examination  /66 (BP Location: Left arm, Patient Position: Lying)   Pulse 76   Temp 98.1 °F (36.7 °C) (Skin)   Resp 16   Wt 93 kg (205 lb)   LMP 05/23/2018   SpO2 98%   Breastfeeding No   BMI 31.17 kg/m²   General appearance: alert, cooperative, no distress  HENT: Normocephalic, atraumatic, neck symmetrical, no nasal discharge, sclera anicteric   Lungs: clear to auscultation bilaterally, symmetric chest wall expansion bilaterally  Heart: regular rate and rhythm without rub; no displacement of the PMI   Abdomen: soft  Extremities: extremities symmetric; no clubbing, cyanosis, or edema        Labs:  Lab Results   Component Value Date    WBC 11.21 02/28/2022    HGB 12.6 02/28/2022    HCT 40.8 02/28/2022    MCV 88 02/28/2022     02/28/2022       Assessment:   42 y.o. female presents for colonoscopy    Plan:  -proceed to colonoscopy     MD Maria Luisa [facility]  [address] [place] [address], [address]  [place] [address]  Office: [phone]  Fax: [phone]

## 2022-12-21 NOTE — PROVATION PATIENT INSTRUCTIONS
Discharge Summary/Instructions after an Endoscopic Procedure  Patient Name: Pura Gudino  Patient MRN: [id number]  Patient YOB: 1980 Wednesday, December 21, 2022  Mikala Parker MD  Dear patient,  As a result of recent federal legislation (The Federal Cures Act), you may   receive lab or pathology results from your procedure in your [facility]   account before your physician is able to contact you. Your physician or   their representative will relay the results to you with their   recommendations at their soonest availability.  Thank you,  RESTRICTIONS:  During your procedure today, you received medications for sedation.  These   medications may affect your judgment, balance and coordination.  Therefore,   for 24 hours, you have the following restrictions:   - DO NOT drive a car, operate machinery, make legal/financial decisions,   sign important papers or drink alcohol.    ACTIVITY:  Today: no heavy lifting, straining or running due to procedural   sedation/anesthesia.  The following day: return to full activity including work.  DIET:  Eat and drink normally unless instructed otherwise.     TREATMENT FOR COMMON SIDE EFFECTS:  - Mild abdominal pain, nausea, belching, bloating or excessive gas:  rest,   eat lightly and use a heating pad.  - Sore Throat: treat with throat lozenges and/or gargle with warm salt   water.  - Because air was used during the procedure, expelling large amounts of air   from your rectum or belching is normal.  - If a bowel prep was taken, you may not have a bowel movement for 1-3 days.    This is normal.  SYMPTOMS TO WATCH FOR AND REPORT TO YOUR PHYSICIAN:  1. Abdominal pain or bloating, other than gas cramps.  2. Chest pain.  3. Back pain.  4. Signs of infection such as: chills or fever occurring within 24 hours   after the procedure.  5. Rectal bleeding, which would show as bright red, maroon, or black stools.   (A tablespoon of blood from the rectum is not  serious, especially if   hemorrhoids are present.)  6. Vomiting.  7. Weakness or dizziness.  GO DIRECTLY TO THE NEAREST EMERGENCY ROOM IF YOU HAVE ANY OF THE FOLLOWING:      Difficulty breathing              Chills and/or fever over 101 F   Persistent vomiting and/or vomiting blood   Severe abdominal pain   Severe chest pain   Black, tarry stools   Bleeding- more than one tablespoon   Any other symptom or condition that you feel may need urgent attention  Your doctor recommends these additional instructions:  If any biopsies were taken, your doctors clinic will contact you in 1 to 2   weeks with any results.  - Discharge patient to home (with escort).   - Patient has a contact number available for emergencies.  The signs and   symptoms of potential delayed complications were discussed with the   patient.  Return to normal activities tomorrow.  Written discharge   instructions were provided to the patient.   - Resume previous diet.   - Continue present medications including daily Trulance  -Begin OTC Magnesium Oxide 400 mg nightly   - Await pathology results.   - Repeat colonoscopy in 5-10 years for surveillance.   - Return to nurse practitioner in 3 months.  For questions, problems or results please call your physician - Mikala Parker MD at Work:  [phone].  [facility], EMERGENCY ROOM PHONE NUMBER: [phone]  IF A COMPLICATION OR EMERGENCY SITUATION ARISES AND YOU ARE UNABLE TO REACH   YOUR PHYSICIAN - GO DIRECTLY TO THE EMERGENCY ROOM.  Mikala Parker MD  12/21/2022 11:50:19 AM  This report has been verified and signed electronically.  Dear patient,  As a result of recent federal legislation (The Federal Cures Act), you may   receive lab or pathology results from your procedure in your [facility]   account before your physician is able to contact you. Your physician or   their representative will relay the results to you with their   recommendations at their soonest  availability.  Thank you,  PROVATION

## 2022-12-21 NOTE — ANESTHESIA POSTPROCEDURE EVALUATION
Anesthesia Post Evaluation    Patient: Pura Gudino    Procedure(s) Performed: Procedure(s) (LRB):  COLONOSCOPY (N/A)    Final Anesthesia Type: general      Patient location during evaluation: PACU  Patient participation: Yes- Able to Participate  Level of consciousness: awake and alert  Post-procedure vital signs: reviewed and stable  Pain management: adequate  Airway patency: patent    PONV status at discharge: No PONV  Anesthetic complications: no      Cardiovascular status: hemodynamically stable  Respiratory status: unassisted and room air  Hydration status: euvolemic  Follow-up not needed.          Vitals Value Taken Time   BP 99/56 12/21/22 1155     12/21/22 1206   Pulse 70 12/21/22 1155   Resp 16 12/21/22 1155   SpO2 99 % 12/21/22 1155         No case tracking events are documented in the log.      Pain/Víctor Score: Víctor Score: 10 (12/21/2022 11:50 AM)

## 2022-12-21 NOTE — TRANSFER OF CARE
Anesthesia Transfer of Care Note    Patient: Pura Gudino    Procedure(s) Performed: Procedure(s) (LRB):  COLONOSCOPY (N/A)    Patient location: PACU    Anesthesia Type: general    Transport from OR: Transported from OR on room air with adequate spontaneous ventilation    Post pain: adequate analgesia    Post assessment: no apparent anesthetic complications and tolerated procedure well    Post vital signs: stable    Level of consciousness: sedated and responds to stimulation    Nausea/Vomiting: no nausea/vomiting    Complications: none    Transfer of care protocol was followed      Last vitals:   Visit Vitals  /66 (BP Location: Left arm, Patient Position: Lying)   Pulse 76   Temp 36.7 °C (98.1 °F) (Skin)   Resp 16   Wt 93 kg (205 lb)   LMP 05/23/2018   SpO2 98%   Breastfeeding No   BMI 31.17 kg/m²

## 2022-12-21 NOTE — PLAN OF CARE
Patient awake, alert, and oriented.  No complaints of pain; abdomen soft and tender.  Patient passing flatus without difficulty.  Vital signs stable.  Patient tolerated po fluids well.  Dr. Parker spoke with patient and family member prior to discharge.  Patient and family verbalize understanding of all discharge instructions given.  Patient discharged to home accompanied by family

## 2022-12-22 VITALS
SYSTOLIC BLOOD PRESSURE: 103 MMHG | OXYGEN SATURATION: 99 % | HEART RATE: 69 BPM | RESPIRATION RATE: 19 BRPM | BODY MASS INDEX: 31.17 KG/M2 | DIASTOLIC BLOOD PRESSURE: 58 MMHG | WEIGHT: 205 LBS | TEMPERATURE: 99 F

## 2022-12-28 LAB
FINAL PATHOLOGIC DIAGNOSIS: NORMAL
GROSS: NORMAL
Lab: NORMAL

## 2023-01-03 ENCOUNTER — OFFICE VISIT (OUTPATIENT)
Dept: FAMILY MEDICINE | Facility: CLINIC | Age: 43
End: 2023-01-03
Payer: COMMERCIAL

## 2023-01-03 VITALS
WEIGHT: 204.13 LBS | OXYGEN SATURATION: 98 % | HEART RATE: 95 BPM | SYSTOLIC BLOOD PRESSURE: 100 MMHG | DIASTOLIC BLOOD PRESSURE: 60 MMHG | HEIGHT: 68 IN | BODY MASS INDEX: 30.94 KG/M2

## 2023-01-03 DIAGNOSIS — Z00.00 ANNUAL PHYSICAL EXAM: Primary | ICD-10-CM

## 2023-01-03 DIAGNOSIS — J45.909 ASTHMA, UNSPECIFIED ASTHMA SEVERITY, UNSPECIFIED WHETHER COMPLICATED, UNSPECIFIED WHETHER PERSISTENT: ICD-10-CM

## 2023-01-03 PROCEDURE — 99999 PR PBB SHADOW E&M-EST. PATIENT-LVL IV: ICD-10-PCS | Mod: PBBFAC,,, | Performed by: INTERNAL MEDICINE

## 2023-01-03 PROCEDURE — 99999 PR PBB SHADOW E&M-EST. PATIENT-LVL IV: CPT | Mod: PBBFAC,,, | Performed by: INTERNAL MEDICINE

## 2023-01-03 PROCEDURE — 3008F PR BODY MASS INDEX (BMI) DOCUMENTED: ICD-10-PCS | Mod: CPTII,S$GLB,, | Performed by: INTERNAL MEDICINE

## 2023-01-03 PROCEDURE — 3074F PR MOST RECENT SYSTOLIC BLOOD PRESSURE < 130 MM HG: ICD-10-PCS | Mod: CPTII,S$GLB,, | Performed by: INTERNAL MEDICINE

## 2023-01-03 PROCEDURE — 3078F PR MOST RECENT DIASTOLIC BLOOD PRESSURE < 80 MM HG: ICD-10-PCS | Mod: CPTII,S$GLB,, | Performed by: INTERNAL MEDICINE

## 2023-01-03 PROCEDURE — 1159F MED LIST DOCD IN RCRD: CPT | Mod: CPTII,S$GLB,, | Performed by: INTERNAL MEDICINE

## 2023-01-03 PROCEDURE — 3078F DIAST BP <80 MM HG: CPT | Mod: CPTII,S$GLB,, | Performed by: INTERNAL MEDICINE

## 2023-01-03 PROCEDURE — 3008F BODY MASS INDEX DOCD: CPT | Mod: CPTII,S$GLB,, | Performed by: INTERNAL MEDICINE

## 2023-01-03 PROCEDURE — 99213 OFFICE O/P EST LOW 20 MIN: CPT | Mod: S$GLB,,, | Performed by: INTERNAL MEDICINE

## 2023-01-03 PROCEDURE — 1159F PR MEDICATION LIST DOCUMENTED IN MEDICAL RECORD: ICD-10-PCS | Mod: CPTII,S$GLB,, | Performed by: INTERNAL MEDICINE

## 2023-01-03 PROCEDURE — 99213 PR OFFICE/OUTPT VISIT, EST, LEVL III, 20-29 MIN: ICD-10-PCS | Mod: S$GLB,,, | Performed by: INTERNAL MEDICINE

## 2023-01-03 PROCEDURE — 3074F SYST BP LT 130 MM HG: CPT | Mod: CPTII,S$GLB,, | Performed by: INTERNAL MEDICINE

## 2023-01-03 RX ORDER — ALBUTEROL SULFATE 0.83 MG/ML
2.5 SOLUTION RESPIRATORY (INHALATION) EVERY 6 HOURS PRN
Qty: 3 ML | Refills: 4 | Status: SHIPPED | OUTPATIENT
Start: 2023-01-03 | End: 2023-12-04

## 2023-01-03 NOTE — PROGRESS NOTES
Subjective:       Patient ID: Pura Gudino is a 42 y.o. female.    Chief Complaint: Cough (Chronic cough since 2021, pt states it is getting worse)    Pt here for same c ough for the last year.  Pt wheezing really badly especially at bedtime. She is using advair but just in the morning.      Cough  This is a chronic problem. The current episode started more than 1 month ago. The problem has been gradually worsening. The problem occurs every few hours. The cough is Non-productive. Associated symptoms include headaches, myalgias, nasal congestion, postnasal drip, rhinorrhea, shortness of breath and wheezing. Pertinent negatives include no chest pain, chills, ear congestion, ear pain, fever, heartburn, hemoptysis, rash, sore throat, sweats or weight loss. The symptoms are aggravated by lying down. She has tried a beta-agonist inhaler, leukotriene antagonists, oral steroids, prescription cough suppressant and steroid inhaler for the symptoms. The treatment provided mild relief. Her past medical history is significant for asthma, bronchitis and environmental allergies. There is no history of bronchiectasis, COPD, emphysema or pneumonia.   Review of Systems   Constitutional:  Negative for chills, fever and weight loss.   HENT:  Positive for postnasal drip and rhinorrhea. Negative for ear pain and sore throat.    Respiratory:  Positive for cough, shortness of breath and wheezing. Negative for hemoptysis.    Cardiovascular:  Negative for chest pain.   Gastrointestinal:  Negative for heartburn.   Musculoskeletal:  Positive for myalgias.   Integumentary:  Negative for rash.   Allergic/Immunologic: Positive for environmental allergies.   Neurological:  Positive for headaches.       Objective:      Physical Exam    Assessment:       Problem List Items Addressed This Visit    None  Visit Diagnoses       Annual physical exam    -  Primary    Relevant Orders    CBC Auto Differential    Lipid Panel    Comprehensive  Metabolic Panel    TSH    Asthma, unspecified asthma severity, unspecified whether complicated, unspecified whether persistent        Relevant Orders    Ambulatory referral/consult to Pulmonology            Plan:       Asthma- incerase advair to BID. Sent in nebulizer as well. Pulm referral.   Annual- check labs

## 2023-01-09 DIAGNOSIS — I10 ESSENTIAL HYPERTENSION: ICD-10-CM

## 2023-01-10 RX ORDER — PROPRANOLOL HYDROCHLORIDE 80 MG/1
CAPSULE, EXTENDED RELEASE ORAL
Qty: 30 CAPSULE | Refills: 11 | OUTPATIENT
Start: 2023-01-10

## 2023-01-10 NOTE — TELEPHONE ENCOUNTER
No new care gaps identified.  [facility] Embedded Care Gaps. Reference number: [id number]. 1/09/2023   6:48:11 PM CST

## 2023-01-10 NOTE — TELEPHONE ENCOUNTER
Provider Staff- Action is required     If a refill request needs assessment, Please pend appropriate medication(s) for patient and route the refill request to the Centralized Refill Staff Pool for assessment.     If medication is already pended in a previous encounter, please add any call/ encounter notes to that previous encounter and route that encounter to the [facility] staff pool High Priority.     If medication is not pended as a Refill Request the data required for the Refill Center to assess will not generate and the medications will not be able to be assessed properly by the Refill Center.     Thank you for your assistance!   [facility]     Note composed:5:49 AM 01/10/2023

## 2023-01-17 ENCOUNTER — PATIENT MESSAGE (OUTPATIENT)
Dept: ADMINISTRATIVE | Facility: HOSPITAL | Age: 43
End: 2023-01-17
Payer: COMMERCIAL

## 2023-01-17 ENCOUNTER — PATIENT MESSAGE (OUTPATIENT)
Dept: FAMILY MEDICINE | Facility: CLINIC | Age: 43
End: 2023-01-17
Payer: COMMERCIAL

## 2023-01-17 ENCOUNTER — LAB VISIT (OUTPATIENT)
Dept: LAB | Facility: HOSPITAL | Age: 43
End: 2023-01-17
Attending: INTERNAL MEDICINE
Payer: COMMERCIAL

## 2023-01-17 DIAGNOSIS — Z00.00 ANNUAL PHYSICAL EXAM: ICD-10-CM

## 2023-01-17 LAB
ALBUMIN SERPL BCP-MCNC: 4 G/DL (ref 3.5–5.2)
ALP SERPL-CCNC: 118 U/L (ref 55–135)
ALT SERPL W/O P-5'-P-CCNC: 15 U/L (ref 10–44)
ANION GAP SERPL CALC-SCNC: 8 MMOL/L (ref 8–16)
AST SERPL-CCNC: 18 U/L (ref 10–40)
BASOPHILS # BLD AUTO: 0.07 K/UL (ref 0–0.2)
BASOPHILS NFR BLD: 1 % (ref 0–1.9)
BILIRUB SERPL-MCNC: 0.5 MG/DL (ref 0.1–1)
BUN SERPL-MCNC: 8 MG/DL (ref 6–20)
CALCIUM SERPL-MCNC: 9.9 MG/DL (ref 8.7–10.5)
CHLORIDE SERPL-SCNC: 109 MMOL/L (ref 95–110)
CO2 SERPL-SCNC: 19 MMOL/L (ref 23–29)
CREAT SERPL-MCNC: 0.9 MG/DL (ref 0.5–1.4)
DIFFERENTIAL METHOD: NORMAL
EOSINOPHIL # BLD AUTO: 0 K/UL (ref 0–0.5)
EOSINOPHIL NFR BLD: 0.6 % (ref 0–8)
ERYTHROCYTE [DISTWIDTH] IN BLOOD BY AUTOMATED COUNT: 13.7 % (ref 11.5–14.5)
EST. GFR  (NO RACE VARIABLE): >60 ML/MIN/1.73 M^2
GLUCOSE SERPL-MCNC: 100 MG/DL (ref 70–110)
HCT VFR BLD AUTO: 42.5 % (ref 37–48.5)
HGB BLD-MCNC: 13.8 G/DL (ref 12–16)
IMM GRANULOCYTES # BLD AUTO: 0.01 K/UL (ref 0–0.04)
IMM GRANULOCYTES NFR BLD AUTO: 0.1 % (ref 0–0.5)
LYMPHOCYTES # BLD AUTO: 1.5 K/UL (ref 1–4.8)
LYMPHOCYTES NFR BLD: 20.7 % (ref 18–48)
MCH RBC QN AUTO: 27.6 PG (ref 27–31)
MCHC RBC AUTO-ENTMCNC: 32.5 G/DL (ref 32–36)
MCV RBC AUTO: 85 FL (ref 82–98)
MONOCYTES # BLD AUTO: 0.6 K/UL (ref 0.3–1)
MONOCYTES NFR BLD: 8.6 % (ref 4–15)
NEUTROPHILS # BLD AUTO: 4.9 K/UL (ref 1.8–7.7)
NEUTROPHILS NFR BLD: 69 % (ref 38–73)
NRBC BLD-RTO: 0 /100 WBC
PLATELET # BLD AUTO: 292 K/UL (ref 150–450)
PMV BLD AUTO: 10 FL (ref 9.2–12.9)
POTASSIUM SERPL-SCNC: 4.3 MMOL/L (ref 3.5–5.1)
PROT SERPL-MCNC: 8.3 G/DL (ref 6–8.4)
RBC # BLD AUTO: 5 M/UL (ref 4–5.4)
SODIUM SERPL-SCNC: 136 MMOL/L (ref 136–145)
WBC # BLD AUTO: 7.06 K/UL (ref 3.9–12.7)

## 2023-01-17 PROCEDURE — 85025 COMPLETE CBC W/AUTO DIFF WBC: CPT | Mod: PN | Performed by: INTERNAL MEDICINE

## 2023-01-17 PROCEDURE — 36415 COLL VENOUS BLD VENIPUNCTURE: CPT | Mod: PN | Performed by: INTERNAL MEDICINE

## 2023-01-17 PROCEDURE — 80053 COMPREHEN METABOLIC PANEL: CPT | Mod: PN | Performed by: INTERNAL MEDICINE

## 2023-01-17 PROCEDURE — 84443 ASSAY THYROID STIM HORMONE: CPT | Performed by: INTERNAL MEDICINE

## 2023-01-17 PROCEDURE — 80061 LIPID PANEL: CPT | Performed by: INTERNAL MEDICINE

## 2023-01-17 PROCEDURE — 84439 ASSAY OF FREE THYROXINE: CPT | Performed by: INTERNAL MEDICINE

## 2023-01-18 ENCOUNTER — PATIENT MESSAGE (OUTPATIENT)
Dept: FAMILY MEDICINE | Facility: CLINIC | Age: 43
End: 2023-01-18
Payer: COMMERCIAL

## 2023-01-18 LAB
CHOLEST SERPL-MCNC: 181 MG/DL (ref 120–199)
CHOLEST/HDLC SERPL: 4.9 {RATIO} (ref 2–5)
HDLC SERPL-MCNC: 37 MG/DL (ref 40–75)
HDLC SERPL: 20.4 % (ref 20–50)
LDLC SERPL CALC-MCNC: 106 MG/DL (ref 63–159)
NONHDLC SERPL-MCNC: 144 MG/DL
T4 FREE SERPL-MCNC: 0.93 NG/DL (ref 0.71–1.51)
TRIGL SERPL-MCNC: 190 MG/DL (ref 30–150)
TSH SERPL DL<=0.005 MIU/L-ACNC: 0.16 UIU/ML (ref 0.4–4)

## 2023-02-13 ENCOUNTER — PATIENT MESSAGE (OUTPATIENT)
Dept: FAMILY MEDICINE | Facility: CLINIC | Age: 43
End: 2023-02-13
Payer: COMMERCIAL

## 2023-02-13 DIAGNOSIS — E03.9 HYPOTHYROIDISM, UNSPECIFIED TYPE: Primary | ICD-10-CM

## 2023-02-15 ENCOUNTER — PATIENT MESSAGE (OUTPATIENT)
Dept: ADMINISTRATIVE | Facility: OTHER | Age: 43
End: 2023-02-15
Payer: COMMERCIAL

## 2023-02-23 ENCOUNTER — LAB VISIT (OUTPATIENT)
Dept: LAB | Facility: HOSPITAL | Age: 43
End: 2023-02-23
Attending: INTERNAL MEDICINE
Payer: COMMERCIAL

## 2023-02-23 DIAGNOSIS — E03.9 HYPOTHYROIDISM, UNSPECIFIED TYPE: ICD-10-CM

## 2023-02-23 LAB — TSH SERPL DL<=0.005 MIU/L-ACNC: 0.56 UIU/ML (ref 0.4–4)

## 2023-02-23 PROCEDURE — 36415 COLL VENOUS BLD VENIPUNCTURE: CPT | Mod: PO | Performed by: INTERNAL MEDICINE

## 2023-02-23 PROCEDURE — 84443 ASSAY THYROID STIM HORMONE: CPT | Performed by: INTERNAL MEDICINE

## 2023-03-07 ENCOUNTER — OFFICE VISIT (OUTPATIENT)
Dept: NEUROLOGY | Facility: CLINIC | Age: 43
End: 2023-03-07
Payer: COMMERCIAL

## 2023-03-07 VITALS
RESPIRATION RATE: 17 BRPM | HEART RATE: 81 BPM | HEIGHT: 68 IN | SYSTOLIC BLOOD PRESSURE: 112 MMHG | BODY MASS INDEX: 29.05 KG/M2 | DIASTOLIC BLOOD PRESSURE: 79 MMHG | WEIGHT: 191.69 LBS

## 2023-03-07 DIAGNOSIS — G43.019 INTRACTABLE MIGRAINE WITHOUT AURA AND WITHOUT STATUS MIGRAINOSUS: Primary | ICD-10-CM

## 2023-03-07 PROCEDURE — 99999 PR PBB SHADOW E&M-EST. PATIENT-LVL IV: CPT | Mod: PBBFAC,,, | Performed by: PHYSICIAN ASSISTANT

## 2023-03-07 PROCEDURE — 99215 PR OFFICE/OUTPT VISIT, EST, LEVL V, 40-54 MIN: ICD-10-PCS | Mod: S$GLB,,, | Performed by: PHYSICIAN ASSISTANT

## 2023-03-07 PROCEDURE — 1160F RVW MEDS BY RX/DR IN RCRD: CPT | Mod: CPTII,S$GLB,, | Performed by: PHYSICIAN ASSISTANT

## 2023-03-07 PROCEDURE — 3008F BODY MASS INDEX DOCD: CPT | Mod: CPTII,S$GLB,, | Performed by: PHYSICIAN ASSISTANT

## 2023-03-07 PROCEDURE — 1160F PR REVIEW ALL MEDS BY PRESCRIBER/CLIN PHARMACIST DOCUMENTED: ICD-10-PCS | Mod: CPTII,S$GLB,, | Performed by: PHYSICIAN ASSISTANT

## 2023-03-07 PROCEDURE — 3008F PR BODY MASS INDEX (BMI) DOCUMENTED: ICD-10-PCS | Mod: CPTII,S$GLB,, | Performed by: PHYSICIAN ASSISTANT

## 2023-03-07 PROCEDURE — 1159F MED LIST DOCD IN RCRD: CPT | Mod: CPTII,S$GLB,, | Performed by: PHYSICIAN ASSISTANT

## 2023-03-07 PROCEDURE — 3078F PR MOST RECENT DIASTOLIC BLOOD PRESSURE < 80 MM HG: ICD-10-PCS | Mod: CPTII,S$GLB,, | Performed by: PHYSICIAN ASSISTANT

## 2023-03-07 PROCEDURE — 1159F PR MEDICATION LIST DOCUMENTED IN MEDICAL RECORD: ICD-10-PCS | Mod: CPTII,S$GLB,, | Performed by: PHYSICIAN ASSISTANT

## 2023-03-07 PROCEDURE — 3074F SYST BP LT 130 MM HG: CPT | Mod: CPTII,S$GLB,, | Performed by: PHYSICIAN ASSISTANT

## 2023-03-07 PROCEDURE — 3074F PR MOST RECENT SYSTOLIC BLOOD PRESSURE < 130 MM HG: ICD-10-PCS | Mod: CPTII,S$GLB,, | Performed by: PHYSICIAN ASSISTANT

## 2023-03-07 PROCEDURE — 99215 OFFICE O/P EST HI 40 MIN: CPT | Mod: S$GLB,,, | Performed by: PHYSICIAN ASSISTANT

## 2023-03-07 PROCEDURE — 3078F DIAST BP <80 MM HG: CPT | Mod: CPTII,S$GLB,, | Performed by: PHYSICIAN ASSISTANT

## 2023-03-07 PROCEDURE — 99999 PR PBB SHADOW E&M-EST. PATIENT-LVL IV: ICD-10-PCS | Mod: PBBFAC,,, | Performed by: PHYSICIAN ASSISTANT

## 2023-03-07 RX ORDER — TOPIRAMATE 25 MG/1
TABLET ORAL
Qty: 90 TABLET | Refills: 1 | Status: SHIPPED | OUTPATIENT
Start: 2023-03-07 | End: 2023-04-18 | Stop reason: SDUPTHER

## 2023-03-07 NOTE — PROGRESS NOTES
"  [facility]  [address] [facility] [address]  [place] [address]  Phone:[phone]  Fax: [phone]   Follow up visit     Patient Name: Pura Gudino  : 1980  MRN:  [id number]  Today: 3/7/2023  LOV: 9/15/2022 royce Augustin   chief complaint: Headache    PCP: Berenice Garrido MD.    Assessment:   Pura Gudino is a 43 y.o. right female with a PMHx of: with fibromyalgia,  IBS -C, PCOS, RA, childhood seizures, GERD, anxiety , depression, asthma and migraines     whom presents with her  in follow up for HA. KAPLAN appear to be chronic migrainous, however, much better since starting ajovy. States she would like to come off the topamax, concern for her "brain fog" (noted PMHx of COVID19). I discussed 1 of 3 things will happen, KAPLAN will stay "great, HA return, or HA return and worsen. Hopefully with coming down on topamax, her "fog" improves.       ICD-10-CM ICD-9-CM   1. Intractable migraine without aura and without status migrainosus  G43.019 346.11         Plan:   For HA Prevention:  1 continue ajovy   2 slow taper off topamax (Q2h before bed)  Week 1: 100 mg+25 mgx3  Week 2: 100 mg+ 25 mgx2  Week 3: 100 mg+25 mg  Week 4: 100 mg  Week 5: 25 mgx3  Week 6: 25 mgx2  Week 7: 25 mg  3 mood medicines per PCP     For HA :  Has ubrelvy   Limit OTC to <3 days use in week     To break up Headaches:  N/a       Other orders:  N/a       All test results as well as any necessary instructions were reviewed and discussed with patient.    Review:  Orders Placed This Encounter    topiramate (TOPAMAX) 25 MG tablet     No orders of the defined types were placed in this encounter.    No orders of the defined types were placed in this encounter.        Patient to return to PCP/other specialists for all other problems  Patient to continue on all medications as Rx'd  A detailed AVS was provided to the patient with patient readback   RTO- 2 months to check in   The patient " indicates understanding of these issues and agrees to the plan.    HPI:   Pura Gudino is a 43 y.o.right handed, female with a PMHx of: with fibromyalgia,  IBS -C, PCOS, RA, childhood seizures, GERD, anxiety , depression, asthma and migraines     whom presents with her  in follow up for HA.        At last visit with Dr. Augustin (9/15/2022): continue ajovy, topamax 100 mg BID, inderal and effexor to reduce HA, ubrelvy to abort HA.   3-4 HD in past month  Pressure in cheeks and around eyes  Aleve made it go away   Had HA this morning  No side effects from ajovy  Ubrelvy for the one migraine she had this month   Feels brain fog is worse, would like to come off topamax   History of trauma (no), History of CNS infection (no), History of Stroke (no)  Severity: range: 1-7/10  Associated factors (bolded positive) WITH HA ( or migraine): Nausea, vomiting, photophobia, phonophobia, tinnitus, scalp pain, vision loss, diplopia, scintillations, eye pain, jaw pain, weakness?    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       From   "Charli's notes (9/15/2022):  "  42Y RH F with fibromyalgia,  IBS -C, PCOS, RA, childhood seizures, GERD, anxiety , depression, asthma who presents for further evaluation of headache. She was initially seen on 6/22.   She mentions that she had headache x 3 more weeks after her initial visit. First month of Ajovy 225 mg she did not notice any improvement in headache. Second month some improvement, but after 3rd months she has really noticed a significant improvement. She has not had any side effects from Ajovy. She mentions that she has had 1 migraine attack in the last 4 weeks. She has had 3 mild headache in the last month that  tylenol resolved them and only 1 time did she need 1 migraine that needed eletriptan 40 mg which helped minimally and the attack continued. She has found that Ubrelvy 50 mg works much better than eletriptan 40 mg and only 1 dose of Ubrelvy 50 mg resolves attack and eletriptan 40 mg will need anther mediation like tylenol and the attacks lasts much longer.  She had a basal cell carcinoma on her left temple resected which caused a lot of pain and triggered the severe attacks for longer. This has resolved now. She is holding off TPX weaning off as she is concerned that she might worsen as she has been on TPX for so long.      Headache history initial visit on 6/10/22:  Age at onset and course over time: began having headache around 2009 typically they were everyday, she was very motion sensitive, car rides made her dizzy/nauseated in 2010 started on  mg bid and she had maybe 1 time every 6 months a migraine level headache. She would turn off all the lights, heavy pillow on head, pressure on face, would take clonazepam to get calm, they were lasting a few hours and would resolve. She was having "sinus headaches" which were 1 time every 3 months. They are mainly pressure in the cheek bones, mild pain, was able to be functional and no migraine symoptms resolved quickly.      She had COVID 19 " in 1/22 had body aches, fever, sore throat, coughing, fatigue, headache which lasted for about 1 week, then she had the flu type A about a month after that. She reports that since then she has been having multiple sinus infections, lots of drainage, she has had some sinus headaches and will take Aleve 2-3 times a week for those sinus headache.  The sinus headaches she is having now are mainly frontal maxillary, they will stay mild, post nasal drip, if does not treat with Aleve would intensify and worsen her activities and feels like eyebrows are being pushed. These last for about a few hours the aleve improves it, but will come back in the evening. They don't resolve all the day, but better.      Migraine level pain: every 1-2 weeks or so , starts with the feeling of heat in the left eye crescent shape around the left eye , starts faint and gets stronger that can last up to 2 days or so. She will also have the light/sound/smell sensitivity, nausea, the pain described as pulsing heat/sharp pain. She will then take the rizatriptan 10mg which   These attacks last about 1-2 days or so. Denies any side effects. Typically rizatriptan 10mg about 3-4 hours. She never does a second dose. Since the worsening she has not had any head imaging. She has been under more stress and having new job a school psychologist. She mentions that she is fostering and is 4 years old and visiting more with bio family and been stresses     She mentions that she would only had triggers to the side of the face like waxing/injury to that side. She mentions that when they first started did not start with heat crescent first and then from there.     Location: frontal, around left eye and then temporal and will become variable location  Character: tight band, pressure, heat, sharp, pulsing   Intensity:  1-9/10 , current 1/10   Frequency: 3 times a week milder attack, 1-2 days a week severe   Duration: milder attacks 1 day, migraine 1-2 days   Aura:   "Denies   Associated symptoms: photophobia, phonophobia, osmophobia, kinesiophobia, neck tightness/pain, nausea/vomiting  Other neurologic symptoms: nasal congestion, neck tightness/pain, difficulty concentration, problems with task completion, dizziness, OS blurry vision  Precipitating factors:  Stress, physical trigger to the Left V1 distribution like injury or waxing of eye brow   Alleviating factors:  darkness,  local pressure, medications  Aggravating factors: stress  ER/UC visits: 0   Caffeine:  2 cups coffee/work days   Sleep: reports good   GYN: s/p hysterectomy "    Medication Reconciliation:   Current Outpatient Medications   Medication Sig Dispense Refill    albuterol (PROVENTIL) 2.5 mg /3 mL (0.083 %) nebulizer solution Take 3 mLs (2.5 mg total) by nebulization every 6 (six) hours as needed for Wheezing. Rescue 3 mL 4    albuterol (PROVENTIL/VENTOLIN HFA) 90 mcg/actuation inhaler Inhale 2 puffs into the lungs every 4 (four) hours as needed for Wheezing or Shortness of Breath. Rescue 8 g 2    buPROPion (WELLBUTRIN XL) 150 MG TB24 tablet Take 150 mg by mouth every morning.      clobetasoL (TEMOVATE) 0.05 % external solution Apply topically 2 (two) times daily. Apply to the scalp 50 mL 1    clonazePAM (KLONOPIN) 0.5 MG tablet Take 0.5 mg by mouth daily as needed.      ENBREL SURECLICK 50 mg/mL (0.98 mL) PnIj   2    EPICERAM Ramon Apply topically 2 (two) times daily.      fluticasone propionate (FLONASE) 50 mcg/actuation nasal spray SHAKE LIQUID AND USE 2 SPRAYS(100 MCG) IN EACH NOSTRIL EVERY DAY FOR 14 DAYS (Patient not taking: Reported on 1/3/2023) 48 g 0    fluticasone-salmeterol diskus inhaler 250-50 mcg Inhale 1 puff into the lungs 2 (two) times daily. Controller 60 each 11    fremanezumab-vfrm (AJOVY AUTOINJECTOR) 225 mg/1.5 mL autoinjector Inject 1.5 mLs (225 mg total) into the skin every 30 days. 1.5 mL 11    montelukast (SINGULAIR) 10 mg tablet TAKE 1 TABLET(10 MG) BY MOUTH EVERY EVENING (Patient not " taking: Reported on 1/3/2023) 30 tablet 5    nebulizer accessories Kit 1 kit by Misc.(Non-Drug; Combo Route) route daily as needed. 1 kit 0    pantoprazole (PROTONIX) 40 MG tablet TAKE 1 TABLET BY MOUTH DAILY 90 tablet 3    plecanatide (TRULANCE) 3 mg Tab Take 3 mg by mouth Daily. 30 tablet 11    propranoloL (INDERAL LA) 80 MG 24 hr capsule TAKE 1 CAPSULE(80 MG) BY MOUTH EVERY NIGHT. 90 capsule 3    semaglutide (OZEMPIC) 0.25 mg or 0.5 mg(2 mg/1.5 mL) pen injector INJECT 0.25MG INTO THE SKIN EVERY 7 DAYS 1 pen 4    topiramate (TOPAMAX) 100 MG tablet TAKE 1 TABLET(100 MG) BY MOUTH TWICE DAILY 60 tablet 5    venlafaxine (EFFEXOR-XR) 150 MG Cp24 Take 150 mg by mouth once daily.       No current facility-administered medications for this visit.     Review of patient's allergies indicates:   Allergen Reactions    Pertussis vaccines     Decongestant allergy Palpitations       PMHx:  Past Medical History:   Diagnosis Date    Allergy     Anemia     Asthma     Chronic diarrhea     Elevated C-reactive protein (CRP)     Fibromyalgia     GERD (gastroesophageal reflux disease)     Headache(784.0)     Irritable bowel syndrome     Mental disorder     Bipolar Disorder, Anxiety, Depression    Motion sickness     Motion sickness     PCOS (polycystic ovarian syndrome)     Rheumatoid arthritis     Seizures     childhood     Past Surgical History:   Procedure Laterality Date    COLONOSCOPY N/A 12/21/2022    Procedure: COLONOSCOPY;  Surgeon: Mikala Parker MD;  Location: [facility];  Service: Endoscopy;  Laterality: N/A;    HYSTERECTOMY      LASER LAPAROSCOPY      left hip surgery had hardware replaced then removed      SINUS SURGERY         Fhx:  Family History   Problem Relation Age of Onset    Rheum arthritis Maternal Grandmother     Bipolar disorder Other     Cataracts Mother     Cataracts Maternal Grandfather     Asthma Maternal Aunt     Alcohol abuse Maternal Aunt     Depression Maternal Aunt     Diabetes Mellitus Maternal Aunt      Heart disease Maternal Aunt     Alcohol abuse Maternal Uncle     Bipolar disorder Maternal Uncle     Heart failure Maternal Uncle     Breast cancer Neg Hx     Ovarian cancer Neg Hx     Migraines Neg Hx        Shx:   Social History     Socioeconomic History    Marital status:     Number of children: 0    Years of education: 19   Occupational History    Occupation: psychologist   Tobacco Use    Smoking status: Never    Smokeless tobacco: Never   Substance and Sexual Activity    Alcohol use: No    Drug use: No    Sexual activity: Yes     Birth control/protection: OCP     Social Determinants of Health     Financial Resource Strain: Unknown    Difficulty of Paying Living Expenses: Patient refused   Food Insecurity: Unknown    Worried About Running Out of Food in the Last Year: Patient refused    Ran Out of Food in the Last Year: Patient refused   Transportation Needs: Unknown    Lack of Transportation (Medical): Patient refused    Lack of Transportation (Non-Medical): Patient refused   Physical Activity: Unknown    Days of Exercise per Week: Patient refused   Stress: Unknown    Feeling of Stress : Patient refused   Social Connections: Unknown    Frequency of Communication with Friends and Family: Patient refused    Frequency of Social Gatherings with Friends and Family: Patient refused    Active Member of Clubs or Organizations: Patient refused    Attends Club or Organization Meetings: Patient refused    Marital Status: Patient refused   Housing Stability: Unknown    Unable to Pay for Housing in the Last Year: Patient refused    Number of Places Lived in the Last Year: 1    Unstable Housing in the Last Year: Patient refused           Labs:   Reviewed in chart     Imagin/3/2022 MRI brain (report): 1.  Normal imaging of the brain.  There is no acute abnormality.  There is no hemorrhage, mass/mass effect, acute infarction.  There is no pathologic enhancement.         Other testing:  Reviewed in chart  "    Note: I have independently reviewed any/all imaging/labs/tests and agree with the report (s) as documented.  Any discrepancies will be as noted/demarcated by free text.  ADAN LEAL 3/7/2023                     ROS:   Review Of Systems (questions asked, positive or additions in BOLD)  Gen: Weight change, fatigue/malaise, pyrexia   HEENT: Tinnitus, headache,  blurred vision, eye pain, diplopia, photophobia,  nose bleeds, congestion, sore throat, jaw pain, scalp pain, neck stiffness   Card: Palpitations, CP   Pulm: SOB, cough   Vas: Easy bruising, easy bleeding   GI: N/V/D/C, incontinence, hematemesis, hematochezia    : incontinence, hematuria        M/S:   falls    Neuro: PER HPI   PSY: Memory loss, confusion, depression, anxiety, trouble in sleep          EXAM:   /79 (BP Location: Left arm, Patient Position: Sitting, BP Method: Medium (Automatic))   Pulse 81   Resp 17   Ht 5' 8" (1.727 m)   Wt 87 kg (191 lb 11 oz)   LMP 05/23/2018   BMI 29.15 kg/m²    GEN:  NAD  HEENT: NC/AT, Frontalis was NTTP, temporalis was NTTP,  nares patent, dentition appropriate     EXTREM: no edema present.    NEURO:  Mental Status:  Awake, alert and appropriately oriented to time, place, and person.  Normal attention and concentration.  Speech is fluent and appropriate language function (I.e., comprehension). Avoids eye contact     Cranial Nerves:   Extraocular movements are intact and without nystagmus.  Visual fields are full to confrontation testing.   Facial movement is symmetric.  Facial sensation is intact.  Hearing is normal. Uvula in midline. FROM of neck in all (6) directions, shoulder shrug symmetrical. Tongue in midline without fasiculation.     Motor:  RUE:appropriate against gravity and medium force as tested 5/5              LUE: appropriate against gravity and medium force as tested 5/5              RLE:appropriate against gravity and medium force as tested 5/5              LLE: appropriate against gravity and " medium force as tested 5/5  No resting tremor   No drift      Sensory:  RUE  intact light touch, proprioception, and temperature  LUE intact light touch, proprioception, and temperature    RLE intact light touch  LLE intact light touch      DTR's:                                            R              L  biceps 2+ 2+         brachioradialis 2+ 2+   Knee jerk 2+ 2+        Coordination:  FTN-WNL.      Gait and Stance: Normal manner of stance and gait function testing.          This document has been electronically signed by Mr. Sb Fisher MPA, PA-C on 3/7/2023, I have personally typed this message using the EMR.       Dr Deep MD  was available during today's visit.     Personal Protective Equipment:    Personal Protective Equipment was used during this encounter including:  mask-surgical/cloth mask and non latex gloves.          CC: Berenice Garrido MD

## 2023-04-04 NOTE — PROGRESS NOTES
"Subjective:      Patient ID: Pura Gudino is a 43 y.o. female.    Chief Complaint: Disease Management    HPI    This is a 43 year old woman with history of anxiety, endometriosis s/p hysterectomy, childhood seizures, depression, asthma, migraines, IBS, fibromyalgia, and seronegative (- ANT, RF, CCP, TTG, Endomysial Ab) rheumatoid arthritis initially diagnosed by Dr Regan in 2013 and on Enbrel (2020- ) who was previously seen by Dr Regan. Currently, she feels well and denies joint pain and swelling. Occasionally, her left shoulder bothers her due to rotator cuff tendinopathy. She last had a shoulder injection 2 years ago that helped significantly.    RA Treatment History:   HCQ  MTX  Humira: caused rashes    Fibromyalgia Treatment History:  Baclofen  Flexeril  Cymbalta  Effexor  Prozac   Zoloft  Lyrica    Objective:   /87   Pulse 76   Ht 5' 7" (1.702 m)   Wt 83.3 kg (183 lb 8.5 oz)   LMP 05/23/2018   BMI 28.75 kg/m²   Physical Exam   Constitutional: normal appearance.   HENT:   Head: Normocephalic and atraumatic.   Cardiovascular: Normal rate, regular rhythm and normal heart sounds.   Pulmonary/Chest: Effort normal and breath sounds normal.   Musculoskeletal:      Comments: No synovitis, dactylitis, enthesitis, effusions     Neurological: She is alert.   Skin: Skin is warm and dry. No rash noted.   No skin thickening, telangiectasias, calcinosis, psoriasiform lesions, lupoid lesions       No data to display     Labs reviewed by me (1/17/2023)  CBC WNL  CMP WNL    Assessment:     1. Seronegative rheumatoid arthritis    2. Drug-induced immunodeficiency    3. High risk medication use    4. Immunization counseling    5. Fibromyalgia      This is a 43 year old woman with history of anxiety, endometriosis s/p hysterectomy, childhood seizures, depression, asthma, migraines, IBS, fibromyalgia, and seronegative (- ANT, RF, CCP, TTG, Endomysial Ab) rheumatoid arthritis initially diagnosed by Dr Regan in " 2013 and on Enbrel (2020- ) who was previously seen by Dr Regan. Currently, she feels well and denies joint pain and swelling. Occasionally, her left shoulder bothers her due to rotator cuff tendinopathy. She last had a shoulder injection 2 years ago that helped significantly.    Plan:     Problem List Items Addressed This Visit          Orthopedic    Fibromyalgia     Other Visit Diagnoses       Seronegative rheumatoid arthritis    -  Primary    Relevant Medications    etanercept (ENBREL SURECLICK) 50 mg/mL (1 mL)    Other Relevant Orders    CBC Auto Differential    Comprehensive Metabolic Panel    Sedimentation rate    C-Reactive Protein    Hepatitis B surface antigen    HBcAB    Hepatitis B surface antibody    Hepatitis C antibody    Quantiferon Gold TB    X-ray Arthritis Survey    Drug-induced immunodeficiency        Relevant Medications    etanercept (ENBREL SURECLICK) 50 mg/mL (1 mL)    Other Relevant Orders    CBC Auto Differential    Comprehensive Metabolic Panel    Sedimentation rate    C-Reactive Protein    Hepatitis B surface antigen    HBcAB    Hepatitis B surface antibody    Hepatitis C antibody    Quantiferon Gold TB    High risk medication use        Immunization counseling              - Continue Enbrel  - CBC, CMP, ESR, CRP every 12 weeks  - Pre-DMARD labs  - Xray arthritis survey  - Immunizations: COVID x 4 (most recent 6/2022), flu (10/2022), patient needs the pneumonia vaccines, and Shingrix  - Birth control: s/p hysterectomy    Follow up in 4-5 months    45 minutes of total time spent on the encounter, which includes face to face time and non-face to face time preparing to see the patient (eg, review of tests), Obtaining and/or reviewing separately obtained history, Documenting clinical information in the electronic or other health record, Independently interpreting results (not separately reported) and communicating results to the patient/family/caregiver, or Care coordination (not separately  reported).       Kortney Williamson M.D.  Rheumatology Dept  [place], [place]

## 2023-04-05 ENCOUNTER — OFFICE VISIT (OUTPATIENT)
Dept: RHEUMATOLOGY | Facility: CLINIC | Age: 43
End: 2023-04-05
Payer: COMMERCIAL

## 2023-04-05 ENCOUNTER — SPECIALTY PHARMACY (OUTPATIENT)
Dept: PHARMACY | Facility: CLINIC | Age: 43
End: 2023-04-05
Payer: COMMERCIAL

## 2023-04-05 ENCOUNTER — HOSPITAL ENCOUNTER (OUTPATIENT)
Dept: RADIOLOGY | Facility: HOSPITAL | Age: 43
Discharge: HOME | End: 2023-04-05
Attending: STUDENT IN AN ORGANIZED HEALTH CARE EDUCATION/TRAINING PROGRAM
Payer: COMMERCIAL

## 2023-04-05 VITALS
HEIGHT: 67 IN | BODY MASS INDEX: 28.81 KG/M2 | HEART RATE: 76 BPM | SYSTOLIC BLOOD PRESSURE: 124 MMHG | DIASTOLIC BLOOD PRESSURE: 87 MMHG | WEIGHT: 183.56 LBS

## 2023-04-05 DIAGNOSIS — Z79.899 HIGH RISK MEDICATION USE: ICD-10-CM

## 2023-04-05 DIAGNOSIS — Z79.899 DRUG-INDUCED IMMUNODEFICIENCY: ICD-10-CM

## 2023-04-05 DIAGNOSIS — M06.00 SERONEGATIVE RHEUMATOID ARTHRITIS: Primary | ICD-10-CM

## 2023-04-05 DIAGNOSIS — M79.7 FIBROMYALGIA: ICD-10-CM

## 2023-04-05 DIAGNOSIS — M06.00 SERONEGATIVE RHEUMATOID ARTHRITIS: ICD-10-CM

## 2023-04-05 DIAGNOSIS — D84.821 DRUG-INDUCED IMMUNODEFICIENCY: ICD-10-CM

## 2023-04-05 DIAGNOSIS — M06.9 RHEUMATOID ARTHRITIS, INVOLVING UNSPECIFIED SITE, UNSPECIFIED WHETHER RHEUMATOID FACTOR PRESENT: Primary | ICD-10-CM

## 2023-04-05 DIAGNOSIS — Z71.85 IMMUNIZATION COUNSELING: ICD-10-CM

## 2023-04-05 PROCEDURE — 1159F MED LIST DOCD IN RCRD: CPT | Mod: CPTII,S$GLB,, | Performed by: STUDENT IN AN ORGANIZED HEALTH CARE EDUCATION/TRAINING PROGRAM

## 2023-04-05 PROCEDURE — 3008F PR BODY MASS INDEX (BMI) DOCUMENTED: ICD-10-PCS | Mod: CPTII,S$GLB,, | Performed by: STUDENT IN AN ORGANIZED HEALTH CARE EDUCATION/TRAINING PROGRAM

## 2023-04-05 PROCEDURE — 77077 JOINT SURVEY SINGLE VIEW: CPT | Mod: TC,PO

## 2023-04-05 PROCEDURE — 3079F DIAST BP 80-89 MM HG: CPT | Mod: CPTII,S$GLB,, | Performed by: STUDENT IN AN ORGANIZED HEALTH CARE EDUCATION/TRAINING PROGRAM

## 2023-04-05 PROCEDURE — 77077 XR ARTHRITIS SURVEY: ICD-10-PCS | Mod: 26,,, | Performed by: RADIOLOGY

## 2023-04-05 PROCEDURE — 99205 OFFICE O/P NEW HI 60 MIN: CPT | Mod: S$GLB,,, | Performed by: STUDENT IN AN ORGANIZED HEALTH CARE EDUCATION/TRAINING PROGRAM

## 2023-04-05 PROCEDURE — 99999 PR PBB SHADOW E&M-EST. PATIENT-LVL IV: CPT | Mod: PBBFAC,,, | Performed by: STUDENT IN AN ORGANIZED HEALTH CARE EDUCATION/TRAINING PROGRAM

## 2023-04-05 PROCEDURE — 1159F PR MEDICATION LIST DOCUMENTED IN MEDICAL RECORD: ICD-10-PCS | Mod: CPTII,S$GLB,, | Performed by: STUDENT IN AN ORGANIZED HEALTH CARE EDUCATION/TRAINING PROGRAM

## 2023-04-05 PROCEDURE — 77077 JOINT SURVEY SINGLE VIEW: CPT | Mod: 26,,, | Performed by: RADIOLOGY

## 2023-04-05 PROCEDURE — 3074F SYST BP LT 130 MM HG: CPT | Mod: CPTII,S$GLB,, | Performed by: STUDENT IN AN ORGANIZED HEALTH CARE EDUCATION/TRAINING PROGRAM

## 2023-04-05 PROCEDURE — 99999 PR PBB SHADOW E&M-EST. PATIENT-LVL IV: ICD-10-PCS | Mod: PBBFAC,,, | Performed by: STUDENT IN AN ORGANIZED HEALTH CARE EDUCATION/TRAINING PROGRAM

## 2023-04-05 PROCEDURE — 3079F PR MOST RECENT DIASTOLIC BLOOD PRESSURE 80-89 MM HG: ICD-10-PCS | Mod: CPTII,S$GLB,, | Performed by: STUDENT IN AN ORGANIZED HEALTH CARE EDUCATION/TRAINING PROGRAM

## 2023-04-05 PROCEDURE — 3074F PR MOST RECENT SYSTOLIC BLOOD PRESSURE < 130 MM HG: ICD-10-PCS | Mod: CPTII,S$GLB,, | Performed by: STUDENT IN AN ORGANIZED HEALTH CARE EDUCATION/TRAINING PROGRAM

## 2023-04-05 PROCEDURE — 99205 PR OFFICE/OUTPT VISIT, NEW, LEVL V, 60-74 MIN: ICD-10-PCS | Mod: S$GLB,,, | Performed by: STUDENT IN AN ORGANIZED HEALTH CARE EDUCATION/TRAINING PROGRAM

## 2023-04-05 PROCEDURE — 3008F BODY MASS INDEX DOCD: CPT | Mod: CPTII,S$GLB,, | Performed by: STUDENT IN AN ORGANIZED HEALTH CARE EDUCATION/TRAINING PROGRAM

## 2023-04-05 RX ORDER — PEAK FLOW METER
EACH MISCELLANEOUS
COMMUNITY
Start: 2023-01-03 | End: 2023-12-04

## 2023-04-05 RX ORDER — BUPROPION HYDROCHLORIDE 300 MG/1
300 TABLET ORAL DAILY
COMMUNITY
Start: 2023-03-03 | End: 2023-07-11

## 2023-04-05 RX ORDER — ETANERCEPT 50 MG/ML
50 SOLUTION SUBCUTANEOUS
Qty: 12 ML | Refills: 1 | Status: SHIPPED | OUTPATIENT
Start: 2023-04-05 | End: 2023-11-02 | Stop reason: SDUPTHER

## 2023-04-05 NOTE — TELEPHONE ENCOUNTER
Nerw order received for Enbrel for RA. PA already on file    Test claim rejects.   PRIOR AUTHORIZATION EXPIRES ON 04/04/24  NEXT AVAILABLE FILL DATE [id number]  LAST FILL DT [id number] FILLED AT [place] [address],PHONE #[phone]    Will call pt to see if she wants to fill with [facility] for next refill or continue filling at [facility].

## 2023-04-11 NOTE — TELEPHONE ENCOUNTER
Outgoing call to pt regarding Enbrel to see if she wants to fill with [facility] or continue filling with [facility]. Pt said she would like to fill with [facility]. Pt reports having 4 pens on hand still. Next injection is due on 4/16. Will call pt on 5/5 for initial consult and delivery.     BI: Complete Enbrel     RX [facility]    Deductible: $1500  Max OOP: $3000  Estimated Copay: $80, Enbrel Credit card in [facility]  [facility] is in network   PA approved until 4/4/24    Forwarding to initial for 5/5

## 2023-04-17 ENCOUNTER — PATIENT MESSAGE (OUTPATIENT)
Dept: FAMILY MEDICINE | Facility: CLINIC | Age: 43
End: 2023-04-17
Payer: COMMERCIAL

## 2023-04-17 ENCOUNTER — PATIENT MESSAGE (OUTPATIENT)
Dept: NEUROLOGY | Facility: CLINIC | Age: 43
End: 2023-04-17
Payer: COMMERCIAL

## 2023-04-17 DIAGNOSIS — E66.9 OBESITY (BMI 30.0-34.9): ICD-10-CM

## 2023-04-18 RX ORDER — TOPIRAMATE 25 MG/1
TABLET ORAL
Qty: 90 TABLET | Refills: 0 | Status: SHIPPED | OUTPATIENT
Start: 2023-04-18 | End: 2023-05-08 | Stop reason: SDUPTHER

## 2023-04-18 RX ORDER — SEMAGLUTIDE 0.68 MG/ML
0.5 INJECTION, SOLUTION SUBCUTANEOUS WEEKLY
Qty: 3 EACH | Refills: 4 | Status: SHIPPED | OUTPATIENT
Start: 2023-04-18 | End: 2023-04-26

## 2023-04-21 ENCOUNTER — TELEPHONE (OUTPATIENT)
Dept: PHARMACY | Facility: CLINIC | Age: 43
End: 2023-04-21
Payer: COMMERCIAL

## 2023-04-22 NOTE — TELEPHONE ENCOUNTER
Please see copied message from pharmacy advise    This message is in regards to Pura Gudino's medication for Ozempic. Under the patient's insurance plan, this medication is ONLY Approved for the following diagnosis: TYPE 2 DIABETES. After reviewing the patient's chart, there was not information that the patient has that diagnosis. Insurance company stated that any diagnosis such as the following: Metabolic Syndrome, Insulin Resistance, Prediabates, Weight Loss Management or Obesity will NOT be Approved as these diagnosis are not Approved by the FDA for this medication. Other medications such as Victoza, Mounjaro, Rybelsus AND Trulicity are also ONLY Approved for Type 2 Diabetes and no other indication per FDA. Thus we will place the prescription on hold as we will not be able to submit the prior authorization as the patient does not meet the criteria. If there is more information you would like to provide that would help the patient meet the criteria, please do not hesitate to reach out to us. Thank you.       Sincerely,   Zane Dodge   Prior Authorization Department   [facility]

## 2023-04-24 ENCOUNTER — PATIENT MESSAGE (OUTPATIENT)
Dept: NEUROLOGY | Facility: CLINIC | Age: 43
End: 2023-04-24
Payer: COMMERCIAL

## 2023-04-25 ENCOUNTER — PATIENT MESSAGE (OUTPATIENT)
Dept: FAMILY MEDICINE | Facility: CLINIC | Age: 43
End: 2023-04-25
Payer: COMMERCIAL

## 2023-04-25 ENCOUNTER — OFFICE VISIT (OUTPATIENT)
Dept: FAMILY MEDICINE | Facility: CLINIC | Age: 43
End: 2023-04-25
Payer: COMMERCIAL

## 2023-04-25 VITALS
HEIGHT: 67 IN | SYSTOLIC BLOOD PRESSURE: 110 MMHG | TEMPERATURE: 98 F | WEIGHT: 181.88 LBS | RESPIRATION RATE: 16 BRPM | HEART RATE: 81 BPM | OXYGEN SATURATION: 99 % | DIASTOLIC BLOOD PRESSURE: 80 MMHG | BODY MASS INDEX: 28.55 KG/M2

## 2023-04-25 DIAGNOSIS — J45.909 ASTHMA, UNSPECIFIED ASTHMA SEVERITY, UNSPECIFIED WHETHER COMPLICATED, UNSPECIFIED WHETHER PERSISTENT: Primary | ICD-10-CM

## 2023-04-25 DIAGNOSIS — H69.93 DYSFUNCTION OF BOTH EUSTACHIAN TUBES: ICD-10-CM

## 2023-04-25 DIAGNOSIS — J30.1 SEASONAL ALLERGIC RHINITIS DUE TO POLLEN: ICD-10-CM

## 2023-04-25 PROCEDURE — 3079F DIAST BP 80-89 MM HG: CPT | Mod: CPTII,S$GLB,,

## 2023-04-25 PROCEDURE — 99214 PR OFFICE/OUTPT VISIT, EST, LEVL IV, 30-39 MIN: ICD-10-PCS | Mod: S$GLB,,,

## 2023-04-25 PROCEDURE — 3074F SYST BP LT 130 MM HG: CPT | Mod: CPTII,S$GLB,,

## 2023-04-25 PROCEDURE — 3008F BODY MASS INDEX DOCD: CPT | Mod: CPTII,S$GLB,,

## 2023-04-25 PROCEDURE — 1159F PR MEDICATION LIST DOCUMENTED IN MEDICAL RECORD: ICD-10-PCS | Mod: CPTII,S$GLB,,

## 2023-04-25 PROCEDURE — 1159F MED LIST DOCD IN RCRD: CPT | Mod: CPTII,S$GLB,,

## 2023-04-25 PROCEDURE — 3079F PR MOST RECENT DIASTOLIC BLOOD PRESSURE 80-89 MM HG: ICD-10-PCS | Mod: CPTII,S$GLB,,

## 2023-04-25 PROCEDURE — 99999 PR PBB SHADOW E&M-EST. PATIENT-LVL IV: CPT | Mod: PBBFAC,,,

## 2023-04-25 PROCEDURE — 99999 PR PBB SHADOW E&M-EST. PATIENT-LVL IV: ICD-10-PCS | Mod: PBBFAC,,,

## 2023-04-25 PROCEDURE — 3074F PR MOST RECENT SYSTOLIC BLOOD PRESSURE < 130 MM HG: ICD-10-PCS | Mod: CPTII,S$GLB,,

## 2023-04-25 PROCEDURE — 99214 OFFICE O/P EST MOD 30 MIN: CPT | Mod: S$GLB,,,

## 2023-04-25 PROCEDURE — 3008F PR BODY MASS INDEX (BMI) DOCUMENTED: ICD-10-PCS | Mod: CPTII,S$GLB,,

## 2023-04-25 RX ORDER — TACROLIMUS 1 MG/G
1 OINTMENT TOPICAL 2 TIMES DAILY
COMMUNITY
Start: 2023-04-11 | End: 2023-12-04

## 2023-04-25 RX ORDER — AZELASTINE 1 MG/ML
1 SPRAY, METERED NASAL 2 TIMES DAILY
Qty: 30 ML | Refills: 0 | Status: SHIPPED | OUTPATIENT
Start: 2023-04-25 | End: 2023-06-10 | Stop reason: SDUPTHER

## 2023-04-25 RX ORDER — METHYLPREDNISOLONE 4 MG/1
TABLET ORAL
Qty: 21 EACH | Refills: 0 | Status: SHIPPED | OUTPATIENT
Start: 2023-04-25 | End: 2023-05-16

## 2023-04-25 NOTE — LETTER
April 25, 2023      [facility]  [address] [place] [address]  Phone: [phone]  Fax: [phone]       Patient: Pura Gudino   YOB: 1980  Date of Visit: 04/25/2023    To Whom It May Concern:    Angelo Gudino  was at [facility] on 04/25/2023. The patient may return to work/school when fever free for 24 hours. If you have any questions or concerns, or if I can be of further assistance, please do not hesitate to contact me.    Sincerely,           Eulalio Giles MA

## 2023-04-25 NOTE — PROGRESS NOTES
[facility]  [address] [place] [address]    Subjective    Chief Complaint:   Chief Complaint   Patient presents with    Otalgia     Left ear, X2 days    Headache     X4 days    Sinus Problem     Drainage for over a week       History of Present Illness:     Pura Gudino is a(n) 43 y.o. female with past medical history as noted below who presents to the clinic today for left ear pain, sinus pressure and postnasal drip.     She has been taking Flonase, singular, and Advair. She has asthma and uses albuterol inhaler PRN wheezing. She denies exposure to covid or influenza. +environmental allergy symptoms. No cough, chest pain or dyspnea.      Problem List:   Patient Active Problem List   Diagnosis    Routine gynecological examination    Pelvic pain in female    Fibromyalgia    History of polycystic ovarian disease    Bipolar disorder    Generalized anxiety disorder    IBS (irritable bowel syndrome)    GERD (gastroesophageal reflux disease)    Shoulder pain, bilateral    Pain of both elbows    Headache(784.0)    Rheumatoid arthritis       Current Outpatient Medications:   Current Outpatient Medications   Medication Instructions    albuterol (PROVENTIL) 2.5 mg, Nebulization, Every 6 hours PRN, Rescue    albuterol (PROVENTIL/VENTOLIN HFA) 90 mcg/actuation inhaler 2 puffs, Inhalation, Every 4 hours PRN, Rescue    buPROPion (WELLBUTRIN XL) 300 mg, Oral, Daily    clobetasoL (TEMOVATE) 0.05 % external solution Topical (Top), 2 times daily, Apply to the scalp    clonazePAM (KLONOPIN) 0.5 mg, Oral, Daily PRN    EnbreL SureClick 50 mg, Subcutaneous, Every 7 days    EPICERAM Ramon Topical (Top), 2 times daily    fluticasone propionate (FLONASE) 50 mcg/actuation nasal spray SHAKE LIQUID AND USE 2 SPRAYS(100 MCG) IN EACH NOSTRIL EVERY DAY FOR 14 DAYS    fluticasone-salmeterol diskus inhaler 250-50 mcg 1 puff, Inhalation, 2 times daily, Controller    fremanezumab-vfrm  (AJOVY AUTOINJECTOR) 225 mg/1.5 mL autoinjector Inject 1.5 mL (225 mg total) into the skin every 30 days.    montelukast (SINGULAIR) 10 mg tablet TAKE 1 TABLET(10 MG) BY MOUTH EVERY EVENING    nebulizer accessories Kit 1 kit, Misc.(Non-Drug; Combo Route), Daily PRN    OZEMPIC 0.5 mg, Subcutaneous, Weekly    pantoprazole (PROTONIX) 40 MG tablet TAKE 1 TABLET BY MOUTH DAILY    propranoloL (INDERAL LA) 80 MG 24 hr capsule TAKE 1 CAPSULE(80 MG) BY MOUTH EVERY NIGHT.    tacrolimus (PROTOPIC) 0.1 % ointment 1 application, Topical (Top), 2 times daily    topiramate (TOPAMAX) 100 MG tablet TAKE 1 TABLET(100 MG) BY MOUTH TWICE DAILY    topiramate (TOPAMAX) 25 MG tablet Taper schedule (take around bedtime in addition to the 100 mg pill), week 1: 3x25 mg +100 mg, weekL 2x25 mg+100 mg, week 3:25 mg+100 mg, week four: follow same proctocol, without the 100 mg dose.    TRULANCE 3 mg, Oral, Daily    venlafaxine (EFFEXOR-XR) 150 mg, Oral, Daily    VIOS AEROSOL DELIVERY SYSTEM Larissa USE DAILY AS NEEDED       Surgical History:   Past Surgical History:   Procedure Laterality Date    COLONOSCOPY N/A 12/21/2022    Procedure: COLONOSCOPY;  Surgeon: Mikala Parker MD;  Location: [facility];  Service: Endoscopy;  Laterality: N/A;    HYSTERECTOMY      LASER LAPAROSCOPY      left hip surgery had hardware replaced then removed      SINUS SURGERY         Family History:   Family History   Problem Relation Age of Onset    Rheum arthritis Maternal Grandmother     Bipolar disorder Other     Cataracts Mother     Cataracts Maternal Grandfather     Asthma Maternal Aunt     Alcohol abuse Maternal Aunt     Depression Maternal Aunt     Diabetes Mellitus Maternal Aunt     Heart disease Maternal Aunt     Alcohol abuse Maternal Uncle     Bipolar disorder Maternal Uncle     Heart failure Maternal Uncle     Breast cancer Neg Hx     Ovarian cancer Neg Hx     Migraines Neg Hx        Allergies:   Review of patient's allergies indicates:   Allergen Reactions  "   Pertussis vaccines     Decongestant allergy Palpitations       Tobacco Status:   Tobacco Use: Low Risk     Smoking Tobacco Use: Never    Smokeless Tobacco Use: Never    Passive Exposure: Not on file       Sexual Activity:   Social History     Substance and Sexual Activity   Sexual Activity Yes    Birth control/protection: OCP       Alcohol Use:   Social History     Substance and Sexual Activity   Alcohol Use No         Objective       Vitals:    04/25/23 0956   BP: 110/80   BP Location: Right arm   Patient Position: Sitting   Pulse: 81   Resp: 16   SpO2: 99%   Weight: 82.5 kg (181 lb 14.1 oz)   Height: 5' 7" (1.702 m)       Review of Systems   Constitutional:  Negative for chills and fever.   HENT:  Positive for congestion, ear pain, sinus pain and sore throat. Negative for ear discharge.    Respiratory:  Negative for cough and shortness of breath.    Cardiovascular:  Negative for chest pain.     Physical Exam  Constitutional:       General: She is not in acute distress.     Appearance: Normal appearance.   HENT:      Head: Normocephalic and atraumatic.      Right Ear: Tympanic membrane, ear canal and external ear normal.      Left Ear: Tympanic membrane, ear canal and external ear normal.      Nose: Nose normal.      Mouth/Throat:      Mouth: Mucous membranes are moist.      Pharynx: Oropharynx is clear. No oropharyngeal exudate or posterior oropharyngeal erythema.   Eyes:      Pupils: Pupils are equal, round, and reactive to light.   Cardiovascular:      Rate and Rhythm: Normal rate and regular rhythm.      Heart sounds: Normal heart sounds. No murmur heard.  Pulmonary:      Effort: Pulmonary effort is normal. No respiratory distress.      Breath sounds: No stridor. Wheezing (faint, inspiratory, anterior chest) present. No rhonchi or rales.   Musculoskeletal:      Cervical back: Normal range of motion.   Lymphadenopathy:      Cervical: No cervical adenopathy.   Skin:     General: Skin is warm.   Neurological: "      Mental Status: She is alert and oriented to person, place, and time.   Psychiatric:         Behavior: Behavior normal.         Assessment and Plan:    1. Asthma, unspecified asthma severity, unspecified whether complicated, unspecified whether persistent  -     methylPREDNISolone (MEDROL DOSEPACK) 4 mg tablet; Use as directed per package  Dispense: 21 each; Refill: 0    2. Seasonal allergic rhinitis due to pollen  -     azelastine (ASTELIN) 137 mcg (0.1 %) nasal spray; Use 1 spray (137 mcg total) in each nostril 2 (two) times daily.  Dispense: 30 mL; Refill: 0    3. Dysfunction of both eustachian tubes        Visit summary:    Pura Gudino presented today for allergic rhinitis.     Symptoms consistent with allergic rhinitis. I recommend trying antihistamine such as Zyrtec or Claritin. Also prescribed azelastine spray. Continue Flonase.     Continue flonase and antihistamine for eustachian tube dysfunction.     Hx asthma with +wheezes on exam. Prescribed steroid dose pack. ER precautions given for severe symptoms including shortness of breath.    Patient was instructed to report to ER if symptoms become severe.    Follow up: No follow-ups on file.      Elisabeth Parks PA-C

## 2023-04-26 ENCOUNTER — PATIENT MESSAGE (OUTPATIENT)
Dept: FAMILY MEDICINE | Facility: CLINIC | Age: 43
End: 2023-04-26
Payer: COMMERCIAL

## 2023-04-26 RX ORDER — SEMAGLUTIDE 0.25 MG/.5ML
0.25 INJECTION, SOLUTION SUBCUTANEOUS
Qty: 0.5 ML | Refills: 4 | Status: SHIPPED | OUTPATIENT
Start: 2023-04-26 | End: 2023-12-04

## 2023-04-28 ENCOUNTER — TELEPHONE (OUTPATIENT)
Dept: FAMILY MEDICINE | Facility: CLINIC | Age: 43
End: 2023-04-28
Payer: COMMERCIAL

## 2023-05-05 ENCOUNTER — SPECIALTY PHARMACY (OUTPATIENT)
Dept: PHARMACY | Facility: CLINIC | Age: 43
End: 2023-05-05
Payer: COMMERCIAL

## 2023-05-05 DIAGNOSIS — M06.9 RHEUMATOID ARTHRITIS, INVOLVING UNSPECIFIED SITE, UNSPECIFIED WHETHER RHEUMATOID FACTOR PRESENT: Primary | ICD-10-CM

## 2023-05-05 NOTE — TELEPHONE ENCOUNTER
Specialty Pharmacy - Initial Clinical Assessment    Specialty Medication Orders Linked to Encounter      Flowsheet Row Most Recent Value   Medication #1 etanercept (ENBREL SURECLICK) 50 mg/mL (1 mL) (Order#[id number], Rx#[id number])          Patient Diagnosis   M06.9 - Rheumatoid arthritis, involving unspecified site, unspecified whether rheumatoid factor present    Subjective    Pura Gudino is a 43 y.o. female, who is followed by the specialty pharmacy service for management and education.    Recent Encounters       Date Type Provider Description    05/05/2023 Specialty Pharmacy Lisa Arechiga PharmD Initial Clinical Assessment    04/05/2023 Specialty Pharmacy Lisa Arechiga PharmD Referral Authorization            Current Outpatient Medications   Medication Sig    albuterol (PROVENTIL) 2.5 mg /3 mL (0.083 %) nebulizer solution Take 3 mLs (2.5 mg total) by nebulization every 6 (six) hours as needed for Wheezing. Rescue    albuterol (PROVENTIL/VENTOLIN HFA) 90 mcg/actuation inhaler Inhale 2 puffs into the lungs every 4 (four) hours as needed for Wheezing or Shortness of Breath. Rescue    azelastine (ASTELIN) 137 mcg (0.1 %) nasal spray Use 1 spray (137 mcg total) in each nostril 2 (two) times daily.    buPROPion (WELLBUTRIN XL) 300 MG 24 hr tablet Take 300 mg by mouth once daily.    clobetasoL (TEMOVATE) 0.05 % external solution Apply topically 2 (two) times daily. Apply to the scalp    clonazePAM (KLONOPIN) 0.5 MG tablet Take 0.5 mg by mouth daily as needed.    EPICERAM Ramon Apply topically 2 (two) times daily.    etanercept (ENBREL SURECLICK) 50 mg/mL (1 mL) Inject 1 mL (50 mg total) into the skin every 7 days.    fluticasone propionate (FLONASE) 50 mcg/actuation nasal spray SHAKE LIQUID AND USE 2 SPRAYS(100 MCG) IN EACH NOSTRIL EVERY DAY FOR 14 DAYS    fluticasone-salmeterol diskus inhaler 250-50 mcg Inhale 1 puff into the lungs 2 (two) times daily. Controller    fremanezumab-vfrm (AJOVY AUTOINJECTOR) 225  mg/1.5 mL autoinjector Inject 1.5 mL (225 mg total) into the skin every 30 days.    methylPREDNISolone (MEDROL DOSEPACK) 4 mg tablet Use as directed per package    montelukast (SINGULAIR) 10 mg tablet TAKE 1 TABLET(10 MG) BY MOUTH EVERY EVENING    nebulizer accessories Kit 1 kit by Misc.(Non-Drug; Combo Route) route daily as needed.    pantoprazole (PROTONIX) 40 MG tablet TAKE 1 TABLET BY MOUTH DAILY    plecanatide (TRULANCE) 3 mg Tab Take 3 mg by mouth Daily. (Patient not taking: Reported on 4/25/2023)    propranoloL (INDERAL LA) 80 MG 24 hr capsule TAKE 1 CAPSULE(80 MG) BY MOUTH EVERY NIGHT.    semaglutide, weight loss, (WEGOVY) 0.25 mg/0.5 mL PnIj Inject 0.25 mg into the skin every 7 days.    tacrolimus (PROTOPIC) 0.1 % ointment Apply 1 application topically 2 (two) times daily.    topiramate (TOPAMAX) 100 MG tablet TAKE 1 TABLET(100 MG) BY MOUTH TWICE DAILY    topiramate (TOPAMAX) 25 MG tablet Taper schedule (take around bedtime in addition to the 100 mg pill), week 1: 3x25 mg +100 mg, weekL 2x25 mg+100 mg, week 3:25 mg+100 mg, week four: follow same proctocol, without the 100 mg dose.    venlafaxine (EFFEXOR-XR) 150 MG Cp24 Take 150 mg by mouth once daily.    VIOS AEROSOL DELIVERY SYSTEM Larissa USE DAILY AS NEEDED   Last reviewed on 4/25/2023  9:55 AM by Eulalio Giles MA    Review of patient's allergies indicates:   Allergen Reactions    Pertussis vaccines     Decongestant allergy Palpitations   Last reviewed on  5/5/2023 9:55 AM by Lisa Arechiga          Assessment Questions - Documented Responses      Flowsheet Row Most Recent Value   Assessment    Medication Reconciliation completed for patient Yes   During the past 4 weeks, has patient missed any activities due to condition or medication? No   During the past 4 weeks, did patient have any of the following urgent care visits? None   Goals of Therapy Status Achieving   Status of the patients ability to self-administer: Is Able   All education points have been  covered with patient? No, patient declined- printed education provided  [Pt on medication already]   Welcome packet contents reviewed and discussed with patient? Yes   Assesment completed? Yes   Plan Therapy continued   Do you need to open a clinical intervention (i-vent)? No   Do you want to schedule first shipment? Yes   Medication #1 Assessment Info    Patient status Existing medication, New to [facility]   Is this medication appropriate for the patient? Yes   Is this medication effective? Yes          Refill Questions - Documented Responses      Flowsheet Row Most Recent Value   Patient Availability and HIPAA Verification    Does patient want to proceed with activity? Yes   HIPAA/medical authority confirmed? Yes   Relationship to patient of person spoken to? Self   Refill Screening Questions    When does the patient need to receive the medication? 05/14/23   Refill Delivery Questions    How will the patient receive the medication? [facility]   When does the patient need to receive the medication? 05/14/23   Shipping Address Home   Address in [facility] confirmed and updated if neccessary? Yes   Expected Copay ($) 80   Is the patient able to afford the medication copay? Yes   Payment Method  CC on file   Days supply of Refill 28   Supplies needed? No supplies needed   Refill activity completed? Yes   Refill activity plan Refill scheduled   Shipment/Pickup Date: 05/10/23            Objective    She has a past medical history of Allergy, Anemia, Asthma, Chronic diarrhea, Elevated C-reactive protein (CRP), Fibromyalgia, GERD (gastroesophageal reflux disease), Headache(784.0), Irritable bowel syndrome, Mental disorder, Motion sickness, Motion sickness, PCOS (polycystic ovarian syndrome), Rheumatoid arthritis, and Seizures.    Tried/failed medications: MTX, HCQ, Humira, Enbrel     BP Readings from Last 4 Encounters:   04/25/23 110/80   04/05/23 124/87   03/07/23 112/79   01/03/23 100/60     Ht Readings from  "Last 4 Encounters:   04/25/23 5' 7" (1.702 m)   04/05/23 5' 7" (1.702 m)   03/07/23 5' 8" (1.727 m)   01/03/23 5' 8" (1.727 m)     Wt Readings from Last 4 Encounters:   04/25/23 82.5 kg (181 lb 14.1 oz)   04/05/23 83.3 kg (183 lb 8.5 oz)   03/07/23 87 kg (191 lb 11 oz)   01/03/23 92.6 kg (204 lb 2.3 oz)     Recent Labs   Lab Result Units 04/05/23  1220   Creatinine mg/dL 0.9   ALT U/L 10   AST U/L 13   Hepatitis B Surface Ag  Non-reactive   Hep B S Ab  <3.00  Non-reactive   Hep B Core Total Ab  Non-reactive     The goals of rheumatoid arthritis treatment include:  Relieving pain and suppressing inflammation  Achieving remission and preventing joint and organ damage  Increasing joint mobility and strength  Preventing infection and other complications of treatment  Reducing long term complications of rheumatoid arthritis  Improving or maintaining physical function and optimal well-being  Improving or maintaining quality of life  Maintaining optimal therapy adherence  Minimizing and managing side effects    Goals of Therapy Status: Achieving    Assessment/Plan  Patient plans to continue therapy without changes      Indication, dosage, appropriateness, effectiveness, safety and convenience of her specialty medication(s) were reviewed today.     Patient Education   Pharmacist offer to  patient was declined. Printed educational materials will be provided with medication.      Transfer to [facility] from [facility]. Pt already on medication. Declined initial consult, [facility] 10 questions.     Tasks added this encounter   2/5/2024 - Clinical Assessment (1 year recurrence)  5/3/2024 - Benefits Review (Annual recurrence)   Tasks due within next 3 months   No tasks due.     Lisa Arechiga, PharmD  [facility]  [address]  [facility] [address]  Phone: [phone]  Fax: [phone]  "

## 2023-05-08 ENCOUNTER — OFFICE VISIT (OUTPATIENT)
Dept: NEUROLOGY | Facility: CLINIC | Age: 43
End: 2023-05-08
Payer: COMMERCIAL

## 2023-05-08 DIAGNOSIS — G43.019 INTRACTABLE MIGRAINE WITHOUT AURA AND WITHOUT STATUS MIGRAINOSUS: Primary | ICD-10-CM

## 2023-05-08 PROCEDURE — 99213 PR OFFICE/OUTPT VISIT, EST, LEVL III, 20-29 MIN: ICD-10-PCS | Mod: 95,,, | Performed by: PHYSICIAN ASSISTANT

## 2023-05-08 PROCEDURE — 99213 OFFICE O/P EST LOW 20 MIN: CPT | Mod: 95,,, | Performed by: PHYSICIAN ASSISTANT

## 2023-05-08 PROCEDURE — 1160F PR REVIEW ALL MEDS BY PRESCRIBER/CLIN PHARMACIST DOCUMENTED: ICD-10-PCS | Mod: CPTII,95,, | Performed by: PHYSICIAN ASSISTANT

## 2023-05-08 PROCEDURE — 1159F PR MEDICATION LIST DOCUMENTED IN MEDICAL RECORD: ICD-10-PCS | Mod: CPTII,95,, | Performed by: PHYSICIAN ASSISTANT

## 2023-05-08 PROCEDURE — 1160F RVW MEDS BY RX/DR IN RCRD: CPT | Mod: CPTII,95,, | Performed by: PHYSICIAN ASSISTANT

## 2023-05-08 PROCEDURE — 1159F MED LIST DOCD IN RCRD: CPT | Mod: CPTII,95,, | Performed by: PHYSICIAN ASSISTANT

## 2023-05-08 RX ORDER — UBROGEPANT 100 MG/1
TABLET ORAL
Qty: 12 TABLET | Refills: 6 | Status: SHIPPED | OUTPATIENT
Start: 2023-05-08

## 2023-05-08 RX ORDER — TOPIRAMATE 100 MG/1
TABLET, FILM COATED ORAL
Qty: 90 TABLET | Refills: 1 | Status: SHIPPED | OUTPATIENT
Start: 2023-05-08 | End: 2023-11-23 | Stop reason: SDUPTHER

## 2023-05-08 NOTE — PROGRESS NOTES
[facility]  [address] [place] [address]  Phone:[phone]  Fax: [phone]   Follow up visit -telemed    Provider Location: Work         Name of Location: [facility]  City: [place]   State: [place]  Medium to connect: Video  Patient Location: parking lot  Name of Location:    parking [facility]                                    City: [place]                                                              State: [place]                                         Consent for Electronic Treatment:   This visit was conducted with the use of an interactive audio and/or video telecommunications system that permits real-time communication between the patient and this provider. The patient has submitted their consent to be treated electronically by way of this telephone and/or video technology.  The risks and limitations of the process of telemedicine have been conveyed verbally during this encounter.Each patient to whom he or she provides medical services by telemedicine is:  (1) informed of the relationship between the physician and patient and the respective role of any other health care provider with respect to management of the patient; and (2) notified that he or she may decline to receive medical services by telemedicine and may withdraw from such care at any time.    Face to Face time with patient:   19 minutes of total time spent on the encounter, which includes face to face time and non-face to face time preparing to see the patient (eg, review of tests), Obtaining and/or reviewing separately obtained history, Documenting clinical information in the electronic or other health record, Independently interpreting results (not separately reported) and communicating results to the patient/family/caregiver, or Care coordination (not separately reported).       Patient Name: Pura Gudino  : 1980  MRN:  [id number]  Today: 2023  LOV:  3/7/2023  chief  "complaint: Headache    PCP: Berenice Garrido MD.    Assessment:   Pura Gudino is a 43 y.o. right female wwith a PMHx of: with fibromyalgia,  IBS -C, PCOS, RA, childhood seizures, GERD, anxiety , depression, asthma and migraines     whom presents via telemed  in follow up for HA. KAPLAN appear to be chronic migrainous, however, much better since starting ajovy. States she would like to come off the topamax, concern for her "brain fog" (noted PMHx of COVID19). She was at 200 mg BID, currently at 75 mg QHS and noticed more frequent HA.       ICD-10-CM ICD-9-CM   1. Intractable migraine without aura and without status migrainosus  G43.019 346.11           Plan:   For HA Prevention:  1 continue ajovy   2 return to topamax 100 mg QHS (was at 75 mg)  3 mood medicines per PCP     For HA :  Refilled ubrelvy   Limit OTC to <3 days use in week     To break up Headaches:  N/a       Other orders:  N/a       All test results as well as any necessary instructions were reviewed and discussed with patient.    Review:  Orders Placed This Encounter    topiramate (TOPAMAX) 100 MG tablet    ubrogepant (UBRELVY) 100 mg tablet       No orders of the defined types were placed in this encounter.    No orders of the defined types were placed in this encounter.        Patient to return to PCP/other specialists for all other problems  Patient to continue on all medications as Rx'd  Full office note available online   RTO- she will self schedule and return in 3 months   The patient indicates understanding of these issues and agrees to the plan.    HPI:   Pura Gudino is a 43 y.o.right handed, female with a PMHx of: with fibromyalgia,  IBS -C, PCOS, RA, childhood seizures, GERD, anxiety , depression, asthma and migraines     whom presents via telemed  in follow up for HA.     At last visit: continued ajovy, topamax taper and has ubrelvy.   Pain in LEFT cheek and under left eye   Ubrelvy was abortive  75 mg of " topamax at present (was coming down d/t 200 mg BID causing cognitive changes, feels cognition is better, but more HA now)   Did have sinus infection last week contributing to HA  10 HD a month now, some with facial pain others migrainous (4-6 of the aforementioned migrainous).   Would like to go back up a little on topamax  No HA at present  No side effects from ajovy            From previous visits: with Dr. Augustin (9/15/2022): continue ajovy, topamax 100 mg BID, inderal and effexor to reduce HA, ubrelvy to abort HA.   3-4 HD in past month  Pressure in cheeks and around eyes  Aleve made it go away   Had HA this morning  No side effects from ajovy  Ubrelvy for the one migraine she had this month   Feels brain fog is worse, would like to come off topamax   History of trauma (no), History of CNS infection (no), History of Stroke (no)  Severity: range: 1-7/10  Associated factors (bolded positive) WITH HA ( or migraine): Nausea, vomiting, photophobia, phonophobia, tinnitus, scalp pain, vision loss, diplopia, scintillations, eye pain, jaw pain, weakness?    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       From Dr. Augustin's notes (9/15/2022):  "  42Y RH F with fibromyalgia,  IBS -C, PCOS, RA, childhood seizures, GERD, anxiety , depression, asthma who presents for further evaluation of headache. She was initially seen on 6/22.   She mentions that she had headache x 3 more weeks after her initial visit. First month of Ajovy 225 mg she did not notice any improvement in headache. Second month some improvement, but after 3rd months she has really noticed a significant improvement. She has not had any side effects from Ajovy. She mentions that she has had 1 migraine attack in the last 4 weeks. She has had 3 mild headache in the last month that  tylenol resolved them and only 1 time did she need 1 migraine that needed eletriptan 40 mg which helped minimally and the attack continued. She has found that Ubrelvy 50 mg works much better than eletriptan 40 mg and only 1 dose of Ubrelvy 50 mg resolves attack and eletriptan 40 mg will need anther mediation like tylenol and the attacks lasts much longer.  She had a basal cell carcinoma on her left temple resected which caused a lot of pain and triggered the severe attacks for longer. This has resolved now. She is holding off TPX weaning off as she is concerned that she might worsen as she has been on TPX for so long.      Headache history initial visit on 6/10/22:  Age at onset and course over time: began having headache around 2009 typically they were everyday, she was very motion sensitive, car rides made her dizzy/nauseated in 2010 started on  mg bid and she had maybe 1 time every 6 months a migraine level headache. She would turn off all the lights, heavy pillow on head, pressure on face, would take clonazepam to get calm, they were lasting a few hours and would resolve. She was having "sinus headaches" which were 1 time every 3 months. They are mainly pressure in the cheek bones, mild pain, was able to be functional and " no migraine symoptms resolved quickly.      She had COVID 19 in 1/22 had body aches, fever, sore throat, coughing, fatigue, headache which lasted for about 1 week, then she had the flu type A about a month after that. She reports that since then she has been having multiple sinus infections, lots of drainage, she has had some sinus headaches and will take Aleve 2-3 times a week for those sinus headache.  The sinus headaches she is having now are mainly frontal maxillary, they will stay mild, post nasal drip, if does not treat with Aleve would intensify and worsen her activities and feels like eyebrows are being pushed. These last for about a few hours the aleve improves it, but will come back in the evening. They don't resolve all the day, but better.      Migraine level pain: every 1-2 weeks or so , starts with the feeling of heat in the left eye crescent shape around the left eye , starts faint and gets stronger that can last up to 2 days or so. She will also have the light/sound/smell sensitivity, nausea, the pain described as pulsing heat/sharp pain. She will then take the rizatriptan 10mg which   These attacks last about 1-2 days or so. Denies any side effects. Typically rizatriptan 10mg about 3-4 hours. She never does a second dose. Since the worsening she has not had any head imaging. She has been under more stress and having new job a school psychologist. She mentions that she is fostering and is 4 years old and visiting more with bio family and been stresses     She mentions that she would only had triggers to the side of the face like waxing/injury to that side. She mentions that when they first started did not start with heat crescent first and then from there.     Location: frontal, around left eye and then temporal and will become variable location  Character: tight band, pressure, heat, sharp, pulsing   Intensity:  1-9/10 , current 1/10   Frequency: 3 times a week milder attack, 1-2 days a week severe  "  Duration: milder attacks 1 day, migraine 1-2 days   Aura:  Denies   Associated symptoms: photophobia, phonophobia, osmophobia, kinesiophobia, neck tightness/pain, nausea/vomiting  Other neurologic symptoms: nasal congestion, neck tightness/pain, difficulty concentration, problems with task completion, dizziness, OS blurry vision  Precipitating factors:  Stress, physical trigger to the Left V1 distribution like injury or waxing of eye brow   Alleviating factors:  darkness,  local pressure, medications  Aggravating factors: stress  ER/UC visits: 0   Caffeine:  2 cups coffee/work days   Sleep: reports good   GYN: s/p hysterectomy "    Medication Reconciliation:   Current Outpatient Medications   Medication Sig Dispense Refill    albuterol (PROVENTIL) 2.5 mg /3 mL (0.083 %) nebulizer solution Take 3 mLs (2.5 mg total) by nebulization every 6 (six) hours as needed for Wheezing. Rescue 3 mL 4    albuterol (PROVENTIL/VENTOLIN HFA) 90 mcg/actuation inhaler Inhale 2 puffs into the lungs every 4 (four) hours as needed for Wheezing or Shortness of Breath. Rescue 8 g 2    azelastine (ASTELIN) 137 mcg (0.1 %) nasal spray Use 1 spray (137 mcg total) in each nostril 2 (two) times daily. 30 mL 0    buPROPion (WELLBUTRIN XL) 300 MG 24 hr tablet Take 300 mg by mouth once daily.      clobetasoL (TEMOVATE) 0.05 % external solution Apply topically 2 (two) times daily. Apply to the scalp 50 mL 1    clonazePAM (KLONOPIN) 0.5 MG tablet Take 0.5 mg by mouth daily as needed.      EPICERAM Ramon Apply topically 2 (two) times daily.      etanercept (ENBREL SURECLICK) 50 mg/mL (1 mL) Inject 1 mL (50 mg total) into the skin every 7 days. 12 mL 1    fluticasone propionate (FLONASE) 50 mcg/actuation nasal spray SHAKE LIQUID AND USE 2 SPRAYS(100 MCG) IN EACH NOSTRIL EVERY DAY FOR 14 DAYS 48 g 0    fluticasone-salmeterol diskus inhaler 250-50 mcg Inhale 1 puff into the lungs 2 (two) times daily. Controller 60 each 11    fremanezumab-vfrm (AJOVY " AUTOINJECTOR) 225 mg/1.5 mL autoinjector Inject 1.5 mL (225 mg total) into the skin every 30 days. 1.5 mL 11    methylPREDNISolone (MEDROL DOSEPACK) 4 mg tablet Use as directed per package 21 each 0    montelukast (SINGULAIR) 10 mg tablet TAKE 1 TABLET(10 MG) BY MOUTH EVERY EVENING 30 tablet 5    nebulizer accessories Kit 1 kit by Misc.(Non-Drug; Combo Route) route daily as needed. 1 kit 0    pantoprazole (PROTONIX) 40 MG tablet TAKE 1 TABLET BY MOUTH DAILY 90 tablet 2    plecanatide (TRULANCE) 3 mg Tab Take 3 mg by mouth Daily. (Patient not taking: Reported on 4/25/2023) 30 tablet 11    propranoloL (INDERAL LA) 80 MG 24 hr capsule TAKE 1 CAPSULE(80 MG) BY MOUTH EVERY NIGHT. 90 capsule 3    semaglutide, weight loss, (WEGOVY) 0.25 mg/0.5 mL PnIj Inject 0.25 mg into the skin every 7 days. 0.5 mL 4    tacrolimus (PROTOPIC) 0.1 % ointment Apply 1 application topically 2 (two) times daily.      topiramate (TOPAMAX) 100 MG tablet TAKE 1 TABLET(100 MG) BY MOUTH TWICE DAILY 60 tablet 5    topiramate (TOPAMAX) 25 MG tablet Taper schedule (take around bedtime in addition to the 100 mg pill), week 1: 3x25 mg +100 mg, weekL 2x25 mg+100 mg, week 3:25 mg+100 mg, week four: follow same proctocol, without the 100 mg dose. 90 tablet 0    venlafaxine (EFFEXOR-XR) 150 MG Cp24 Take 150 mg by mouth once daily.      VIOS AEROSOL DELIVERY SYSTEM Larissa USE DAILY AS NEEDED       No current facility-administered medications for this visit.     Review of patient's allergies indicates:   Allergen Reactions    Pertussis vaccines     Decongestant allergy Palpitations       PMHx:  Past Medical History:   Diagnosis Date    Allergy     Anemia     Asthma     Chronic diarrhea     Elevated C-reactive protein (CRP)     Fibromyalgia     GERD (gastroesophageal reflux disease)     Headache(784.0)     Irritable bowel syndrome     Mental disorder     Bipolar Disorder, Anxiety, Depression    Motion sickness     Motion sickness     PCOS (polycystic ovarian  syndrome)     Rheumatoid arthritis     Seizures     childhood     Past Surgical History:   Procedure Laterality Date    COLONOSCOPY N/A 12/21/2022    Procedure: COLONOSCOPY;  Surgeon: Mikala Parker MD;  Location: [facility];  Service: Endoscopy;  Laterality: N/A;    HYSTERECTOMY      LASER LAPAROSCOPY      left hip surgery had hardware replaced then removed      SINUS SURGERY         Fhx:  Family History   Problem Relation Age of Onset    Rheum arthritis Maternal Grandmother     Bipolar disorder Other     Cataracts Mother     Cataracts Maternal Grandfather     Asthma Maternal Aunt     Alcohol abuse Maternal Aunt     Depression Maternal Aunt     Diabetes Mellitus Maternal Aunt     Heart disease Maternal Aunt     Alcohol abuse Maternal Uncle     Bipolar disorder Maternal Uncle     Heart failure Maternal Uncle     Breast cancer Neg Hx     Ovarian cancer Neg Hx     Migraines Neg Hx        Shx:   Social History     Socioeconomic History    Marital status:     Number of children: 0    Years of education: 19   Occupational History    Occupation: psychologist   Tobacco Use    Smoking status: Never    Smokeless tobacco: Never   Substance and Sexual Activity    Alcohol use: No    Drug use: No    Sexual activity: Yes     Birth control/protection: OCP     Social Determinants of Health     Financial Resource Strain: Unknown    Difficulty of Paying Living Expenses: Patient refused   Food Insecurity: Unknown    Worried About Running Out of Food in the Last Year: Patient refused    Ran Out of Food in the Last Year: Patient refused   Transportation Needs: Unknown    Lack of Transportation (Medical): Patient refused    Lack of Transportation (Non-Medical): Patient refused   Physical Activity: Unknown    Days of Exercise per Week: Patient refused   Stress: Unknown    Feeling of Stress : Patient refused   Social Connections: Unknown    Frequency of Communication with Friends and Family: Patient refused    Frequency of Social  Gatherings with Friends and Family: Patient refused    Active Member of Clubs or Organizations: Patient refused    Attends Club or Organization Meetings: Patient refused    Marital Status: Patient refused   Housing Stability: Unknown    Unable to Pay for Housing in the Last Year: Patient refused    Number of Places Lived in the Last Year: 1    Unstable Housing in the Last Year: Patient refused           Labs:   Reviewed in chart     Imagin/3/2022 MRI brain (report): 1.  Normal imaging of the brain.  There is no acute abnormality.  There is no hemorrhage, mass/mass effect, acute infarction.  There is no pathologic enhancement.         Other testing:  Reviewed in chart     Note: I have independently reviewed any/all imaging/labs/tests and agree with the report (s) as documented.  Any discrepancies will be as noted/demarcated by free text.  ADAN LEAL 2023                     ROS:   Review Of Systems (questions asked, positive or additions in BOLD)  Gen: Weight change, fatigue/malaise, pyrexia   HEENT: Tinnitus, headache,  blurred vision, eye pain, diplopia, photophobia,  nose bleeds, congestion, sore throat, jaw pain, scalp pain, neck stiffness   Card: Palpitations, CP   Pulm: SOB, cough   Vas: Easy bruising, easy bleeding   GI: N/V/D/C, incontinence, hematemesis, hematochezia    : incontinence, hematuria        M/S:   falls    Neuro: PER HPI   PSY: Memory loss, confusion, depression, anxiety, trouble in sleep          EXAM:   LMP 2018  <---none taken as this was a virtual visit   GEN:  NAD  HEENT: NC/AT      EXTREM: no edema present.    NEURO:  Mental Status:  Awake, alert and appropriately oriented to time, place, and person.  Normal attention and concentration.  Speech is fluent and appropriate language function (I.e., comprehension).     Cranial Nerves:   Extraocular movements are intact and without nystagmus.    Facial movement is symmetric. Hearing appears normal. Uvula in midline. FROM of neck  in all (6) directions, shoulder shrug symmetrical. Tongue in midline without fasiculation.     Motor:  antigravity bilat UE  No resting tremor     Gait and Stance: not tested today, observed patient casually walking on camera (no overt postural instability noted)     This document has been electronically signed by Mr. Sb Fisher MPA, PA-C on 5/8/2023, I have personally typed this message using the EMR.       Dr Deep MD  was available during today's visit.        CC: Berenice Garrido MD

## 2023-05-12 ENCOUNTER — PATIENT MESSAGE (OUTPATIENT)
Dept: FAMILY MEDICINE | Facility: CLINIC | Age: 43
End: 2023-05-12
Payer: COMMERCIAL

## 2023-05-31 ENCOUNTER — SPECIALTY PHARMACY (OUTPATIENT)
Dept: PHARMACY | Facility: CLINIC | Age: 43
End: 2023-05-31
Payer: COMMERCIAL

## 2023-05-31 NOTE — TELEPHONE ENCOUNTER
Outgoing call: patient next needed injection is 6/18, She stated she missed 1 dose because she was ill. I informed the patient that [facility] will follow up on 6/12 to schedule refill.

## 2023-05-31 NOTE — TELEPHONE ENCOUNTER
Pt called transferred to pharmacist.  Pt stated she received a letter from her insurance to inform her that Dupixent has been approved from 5/23/2023 to 9/23/2023.  Informed pt that [facility] does not have an order for medication to fill.  Pt voiced understanding.  Pt stated she sees an outside derm provider (Dr. Enma Bangura) in [place].  Informed pt that [facility] can fill prescription but new order is needed.  Informed pt will also need a referral form from [facility] and [facility] will fax to office.  Pt voiced understanding and will contact [facility] to send new order.

## 2023-06-07 RX ORDER — FREMANEZUMAB-VFRM 225 MG/1.5ML
225 INJECTION SUBCUTANEOUS
Qty: 1.5 ML | Refills: 11 | Status: CANCELLED | OUTPATIENT
Start: 2023-06-07

## 2023-06-09 RX ORDER — FREMANEZUMAB-VFRM 225 MG/1.5ML
225 INJECTION SUBCUTANEOUS
Qty: 1.5 ML | Refills: 11 | Status: SHIPPED | OUTPATIENT
Start: 2023-06-09

## 2023-06-10 DIAGNOSIS — J30.1 SEASONAL ALLERGIC RHINITIS DUE TO POLLEN: ICD-10-CM

## 2023-06-12 ENCOUNTER — OFFICE VISIT (OUTPATIENT)
Dept: OBSTETRICS AND GYNECOLOGY | Facility: CLINIC | Age: 43
End: 2023-06-12
Payer: COMMERCIAL

## 2023-06-12 VITALS
BODY MASS INDEX: 27.78 KG/M2 | DIASTOLIC BLOOD PRESSURE: 66 MMHG | SYSTOLIC BLOOD PRESSURE: 90 MMHG | HEIGHT: 67 IN | WEIGHT: 177 LBS

## 2023-06-12 DIAGNOSIS — L30.9 ECZEMA, UNSPECIFIED TYPE: ICD-10-CM

## 2023-06-12 DIAGNOSIS — Z12.31 VISIT FOR SCREENING MAMMOGRAM: ICD-10-CM

## 2023-06-12 DIAGNOSIS — Z90.710 S/P HYSTERECTOMY: ICD-10-CM

## 2023-06-12 DIAGNOSIS — Z01.419 ENCOUNTER FOR GYNECOLOGICAL EXAMINATION WITHOUT ABNORMAL FINDING: Primary | ICD-10-CM

## 2023-06-12 PROCEDURE — 1159F MED LIST DOCD IN RCRD: CPT | Mod: CPTII,S$GLB,, | Performed by: OBSTETRICS & GYNECOLOGY

## 2023-06-12 PROCEDURE — 3078F DIAST BP <80 MM HG: CPT | Mod: CPTII,S$GLB,, | Performed by: OBSTETRICS & GYNECOLOGY

## 2023-06-12 PROCEDURE — 99396 PREV VISIT EST AGE 40-64: CPT | Mod: S$GLB,,, | Performed by: OBSTETRICS & GYNECOLOGY

## 2023-06-12 PROCEDURE — 99999 PR PBB SHADOW E&M-EST. PATIENT-LVL V: CPT | Mod: PBBFAC,,, | Performed by: OBSTETRICS & GYNECOLOGY

## 2023-06-12 PROCEDURE — 1159F PR MEDICATION LIST DOCUMENTED IN MEDICAL RECORD: ICD-10-PCS | Mod: CPTII,S$GLB,, | Performed by: OBSTETRICS & GYNECOLOGY

## 2023-06-12 PROCEDURE — 3008F PR BODY MASS INDEX (BMI) DOCUMENTED: ICD-10-PCS | Mod: CPTII,S$GLB,, | Performed by: OBSTETRICS & GYNECOLOGY

## 2023-06-12 PROCEDURE — 3008F BODY MASS INDEX DOCD: CPT | Mod: CPTII,S$GLB,, | Performed by: OBSTETRICS & GYNECOLOGY

## 2023-06-12 PROCEDURE — 3074F SYST BP LT 130 MM HG: CPT | Mod: CPTII,S$GLB,, | Performed by: OBSTETRICS & GYNECOLOGY

## 2023-06-12 PROCEDURE — 99999 PR PBB SHADOW E&M-EST. PATIENT-LVL V: ICD-10-PCS | Mod: PBBFAC,,, | Performed by: OBSTETRICS & GYNECOLOGY

## 2023-06-12 PROCEDURE — 99396 PR PREVENTIVE VISIT,EST,40-64: ICD-10-PCS | Mod: S$GLB,,, | Performed by: OBSTETRICS & GYNECOLOGY

## 2023-06-12 PROCEDURE — 3078F PR MOST RECENT DIASTOLIC BLOOD PRESSURE < 80 MM HG: ICD-10-PCS | Mod: CPTII,S$GLB,, | Performed by: OBSTETRICS & GYNECOLOGY

## 2023-06-12 PROCEDURE — 3074F PR MOST RECENT SYSTOLIC BLOOD PRESSURE < 130 MM HG: ICD-10-PCS | Mod: CPTII,S$GLB,, | Performed by: OBSTETRICS & GYNECOLOGY

## 2023-06-12 RX ORDER — AZELASTINE 1 MG/ML
1 SPRAY, METERED NASAL 2 TIMES DAILY
Qty: 30 ML | Refills: 0 | Status: SHIPPED | OUTPATIENT
Start: 2023-06-12 | End: 2023-07-30 | Stop reason: SDUPTHER

## 2023-06-12 RX ORDER — DUPILUMAB 300 MG/2ML
INJECTION, SOLUTION SUBCUTANEOUS
COMMUNITY
Start: 2023-06-02

## 2023-06-12 NOTE — TELEPHONE ENCOUNTER
Specialty Pharmacy - Refill Coordination    Specialty Medication Orders Linked to Encounter      Flowsheet Row Most Recent Value   Medication #1 etanercept (ENBREL SURECLICK) 50 mg/mL (1 mL) (Order#[id number], Rx#[id number])     Pt missed 1 dose of illness due to unspecified illness.  Pt said she was very congested and was prescribed a steroid pack.  She recovered from the illness and resumed Enbrel appropriately.  Pt started Dupixent (obtained from other SP)- med list updated. Appropriate to proceed with Enbrel refill.     Refill Questions - Documented Responses      Flowsheet Row Most Recent Value   Patient Availability and HIPAA Verification    Does patient want to proceed with activity? Yes   HIPAA/medical authority confirmed? Yes   Relationship to patient of person spoken to? Self   Refill Screening Questions    Changes to allergies? No   Changes to medications? Yes  [+ Dupixent]   New conditions since last clinic visit? No   Unplanned office visit, urgent care, ED, or hospital admission in the last 4 weeks? No   How does patient/caregiver feel medication is working? Too soon to tell   Financial problems or insurance changes? No   How many doses of your specialty medications were missed in the last 4 weeks? 1   Why were doses missed? Felt ill or sick   Would patient like to speak to a pharmacist? No   When does the patient need to receive the medication? 06/18/23   Refill Delivery Questions    How will the patient receive the medication? [facility]   When does the patient need to receive the medication? 06/18/23   Shipping Address Home   Address in [facility] confirmed and updated if neccessary? Yes   Expected Copay ($) 40   Is the patient able to afford the medication copay? Yes   Payment Method  CC on file   Days supply of Refill 28   Supplies needed? No supplies needed   Refill activity completed? Yes   Refill activity plan Refill scheduled   Shipment/Pickup Date: 06/15/23            Current  Outpatient Medications   Medication Sig    albuterol (PROVENTIL) 2.5 mg /3 mL (0.083 %) nebulizer solution Take 3 mLs (2.5 mg total) by nebulization every 6 (six) hours as needed for Wheezing. Rescue    albuterol (PROVENTIL/VENTOLIN HFA) 90 mcg/actuation inhaler Inhale 2 puffs into the lungs every 4 (four) hours as needed for Wheezing or Shortness of Breath. Rescue    azelastine (ASTELIN) 137 mcg (0.1 %) nasal spray Use 1 spray (137 mcg total) in each nostril 2 (two) times daily.    buPROPion (WELLBUTRIN XL) 300 MG 24 hr tablet Take 300 mg by mouth once daily.    buPROPion (WELLBUTRIN XL) 300 MG 24 hr tablet 1 tablet Orally Once a day 90 days    clobetasoL (TEMOVATE) 0.05 % external solution Apply topically 2 (two) times daily. Apply to the scalp    clonazePAM (KLONOPIN) 0.5 MG tablet Take 0.5 mg by mouth daily as needed.    DUPIXENT  mg/2 mL PnIj Inject into the skin.    EPICERAM Ramon Apply topically 2 (two) times daily.    etanercept (ENBREL SURECLICK) 50 mg/mL (1 mL) Inject 1 mL (50 mg total) into the skin every 7 days.    fluticasone propionate (FLONASE) 50 mcg/actuation nasal spray SHAKE LIQUID AND USE 2 SPRAYS(100 MCG) IN EACH NOSTRIL EVERY DAY FOR 14 DAYS    fluticasone-salmeterol diskus inhaler 250-50 mcg Inhale 1 puff into the lungs 2 (two) times daily. Controller    fremanezumab-vfrm (AJOVY AUTOINJECTOR) 225 mg/1.5 mL autoinjector Inject 1.5 mL (225 mg total) into the skin every 30 days.    montelukast (SINGULAIR) 10 mg tablet TAKE 1 TABLET(10 MG) BY MOUTH EVERY EVENING    nebulizer accessories Kit 1 kit by Misc.(Non-Drug; Combo Route) route daily as needed.    pantoprazole (PROTONIX) 40 MG tablet TAKE 1 TABLET BY MOUTH DAILY    plecanatide (TRULANCE) 3 mg Tab Take 3 mg by mouth Daily. (Patient not taking: Reported on 4/25/2023)    propranoloL (INDERAL LA) 80 MG 24 hr capsule TAKE 1 CAPSULE(80 MG) BY MOUTH EVERY NIGHT.    semaglutide, weight loss, (WEGOVY) 0.25 mg/0.5 mL Serene Inject 0.25 mg into the  skin every 7 days.    tacrolimus (PROTOPIC) 0.1 % ointment Apply 1 application topically 2 (two) times daily.    topiramate (TOPAMAX) 100 MG tablet TAKE 1 TABLET(100 MG) BY MOUTH TWICE DAILY    topiramate (TOPAMAX) 100 MG tablet Take 1 tablet by mouth every night two hours before bed    ubrogepant (UBRELVY) 100 mg tablet Take 1 tablet by mouth at onset of headache. Take second tablet 2 hours later, if needed. No more than 2 tablets/day OR 3 days use per week.    venlafaxine (EFFEXOR-XR) 150 MG Cp24 Take 150 mg by mouth once daily.    VIOS AEROSOL DELIVERY SYSTEM Larissa USE DAILY AS NEEDED   Last reviewed on 5/8/2023 11:19 AM by Sb Fisher PA-C    Review of patient's allergies indicates:   Allergen Reactions    Pertussis vaccines     Decongestant allergy Palpitations    Last reviewed on  6/10/2023 12:41 PM by Rhoda Polo      Tasks added this encounter   No tasks added.   Tasks due within next 3 months   No tasks due.     Maame Shepard, PharmD  [facility]  [address] [place] [id number]  Phone: [phone]  Fax: [phone]

## 2023-06-12 NOTE — PROGRESS NOTES
Chief Complaint   Patient presents with    Well Woman    Mammogram     History of Present Illness: Pura Gudino is a 43 y.o. female that presents today 6/12/2023 for well gyn visit.    Past Medical History:   Diagnosis Date    Allergy     Anemia     Asthma     Chronic diarrhea     Elevated C-reactive protein (CRP)     Fibromyalgia     GERD (gastroesophageal reflux disease)     Headache(784.0)     Irritable bowel syndrome     Mental disorder     Bipolar Disorder, Anxiety, Depression    Motion sickness     Motion sickness     PCOS (polycystic ovarian syndrome)     Rheumatoid arthritis     Seizures     childhood       Past Surgical History:   Procedure Laterality Date    COLONOSCOPY N/A 12/21/2022    Procedure: COLONOSCOPY;  Surgeon: Mikala Parker MD;  Location: [facility];  Service: Endoscopy;  Laterality: N/A;    HYSTERECTOMY      LASER LAPAROSCOPY      left hip surgery had hardware replaced then removed      SINUS SURGERY         Current Outpatient Medications   Medication Sig Dispense Refill    albuterol (PROVENTIL) 2.5 mg /3 mL (0.083 %) nebulizer solution Take 3 mLs (2.5 mg total) by nebulization every 6 (six) hours as needed for Wheezing. Rescue 3 mL 4    albuterol (PROVENTIL/VENTOLIN HFA) 90 mcg/actuation inhaler Inhale 2 puffs into the lungs every 4 (four) hours as needed for Wheezing or Shortness of Breath. Rescue 8 g 2    azelastine (ASTELIN) 137 mcg (0.1 %) nasal spray Use 1 spray (137 mcg total) in each nostril 2 (two) times daily. 30 mL 0    buPROPion (WELLBUTRIN XL) 300 MG 24 hr tablet Take 300 mg by mouth once daily.      buPROPion (WELLBUTRIN XL) 300 MG 24 hr tablet 1 tablet Orally Once a day 90 days 90 tablet 0    clobetasoL (TEMOVATE) 0.05 % external solution Apply topically 2 (two) times daily. Apply to the scalp 50 mL 1    clonazePAM (KLONOPIN) 0.5 MG tablet Take 0.5 mg by mouth daily as needed.      DUPIXENT  mg/2 mL PnIj Inject into the skin.      EPICERAM Ramon Apply topically  2 (two) times daily.      etanercept (ENBREL SURECLICK) 50 mg/mL (1 mL) Inject 1 mL (50 mg total) into the skin every 7 days. 12 mL 1    fluticasone propionate (FLONASE) 50 mcg/actuation nasal spray SHAKE LIQUID AND USE 2 SPRAYS(100 MCG) IN EACH NOSTRIL EVERY DAY FOR 14 DAYS 48 g 0    fluticasone-salmeterol diskus inhaler 250-50 mcg Inhale 1 puff into the lungs 2 (two) times daily. Controller 60 each 11    fremanezumab-vfrm (AJOVY AUTOINJECTOR) 225 mg/1.5 mL autoinjector Inject 1.5 mL (225 mg total) into the skin every 30 days. 1.5 mL 11    montelukast (SINGULAIR) 10 mg tablet TAKE 1 TABLET(10 MG) BY MOUTH EVERY EVENING 30 tablet 5    nebulizer accessories Kit 1 kit by Misc.(Non-Drug; Combo Route) route daily as needed. 1 kit 0    pantoprazole (PROTONIX) 40 MG tablet TAKE 1 TABLET BY MOUTH DAILY 90 tablet 2    plecanatide (TRULANCE) 3 mg Tab Take 3 mg by mouth Daily. 30 tablet 11    propranoloL (INDERAL LA) 80 MG 24 hr capsule TAKE 1 CAPSULE(80 MG) BY MOUTH EVERY NIGHT. 90 capsule 3    semaglutide, weight loss, (WEGOVY) 0.25 mg/0.5 mL PnIj Inject 0.25 mg into the skin every 7 days. 0.5 mL 4    tacrolimus (PROTOPIC) 0.1 % ointment Apply 1 application topically 2 (two) times daily.      topiramate (TOPAMAX) 100 MG tablet TAKE 1 TABLET(100 MG) BY MOUTH TWICE DAILY 60 tablet 5    topiramate (TOPAMAX) 100 MG tablet Take 1 tablet by mouth every night two hours before bed 90 tablet 1    ubrogepant (UBRELVY) 100 mg tablet Take 1 tablet by mouth at onset of headache. Take second tablet 2 hours later, if needed. No more than 2 tablets/day OR 3 days use per week. 12 tablet 6    venlafaxine (EFFEXOR-XR) 150 MG Cp24 Take 150 mg by mouth once daily.      VIOS AEROSOL DELIVERY SYSTEM Larissa USE DAILY AS NEEDED      pneumoc 20-yvonne conj-dip cr,PF, (PREVNAR 20, PF,) 0.5 mL Syrg injection Inject 0.5 mLs into the muscle once. for 1 dose 0.5 mL 0    varicella-zoster gE-AS01B, PF, (SHINGRIX) 50 mcg/0.5 mL injection Inject into the muscle.  1 each 1     No current facility-administered medications for this visit.       Review of patient's allergies indicates:   Allergen Reactions    Pertussis vaccines     Decongestant allergy Palpitations       Family History   Problem Relation Age of Onset    Rheum arthritis Maternal Grandmother     Bipolar disorder Other     Cataracts Mother     Cataracts Maternal Grandfather     Asthma Maternal Aunt     Alcohol abuse Maternal Aunt     Depression Maternal Aunt     Diabetes Mellitus Maternal Aunt     Heart disease Maternal Aunt     Alcohol abuse Maternal Uncle     Bipolar disorder Maternal Uncle     Heart failure Maternal Uncle     Breast cancer Neg Hx     Ovarian cancer Neg Hx     Migraines Neg Hx        Social History     Socioeconomic History    Marital status:     Number of children: 0    Years of education: 19   Occupational History    Occupation: psychologist   Tobacco Use    Smoking status: Never    Smokeless tobacco: Never   Substance and Sexual Activity    Alcohol use: No    Drug use: No    Sexual activity: Yes     Birth control/protection: OCP     Social Determinants of Health     Financial Resource Strain: Unknown    Difficulty of Paying Living Expenses: Patient refused   Food Insecurity: Unknown    Worried About Running Out of Food in the Last Year: Patient refused    Ran Out of Food in the Last Year: Patient refused   Transportation Needs: Unknown    Lack of Transportation (Medical): Patient refused    Lack of Transportation (Non-Medical): Patient refused   Physical Activity: Unknown    Days of Exercise per Week: Patient refused   Stress: Unknown    Feeling of Stress : Patient refused   Social Connections: Unknown    Frequency of Communication with Friends and Family: Patient refused    Frequency of Social Gatherings with Friends and Family: Patient refused    Active Member of Clubs or Organizations: Patient refused    Attends Club or Organization Meetings: Patient refused    Marital Status:  "Patient refused   Housing Stability: Unknown    Unable to Pay for Housing in the Last Year: Patient refused    Number of Places Lived in the Last Year: 1    Unstable Housing in the Last Year: Patient refused       OB History    Para Term  AB Living   0             SAB IAB Ectopic Multiple Live Births                   Review of Symptoms:  GENERAL: Denies weight gain or weight loss. Feeling well overall.   SKIN: Denies rash or lesions.   HEAD: Denies head injury or headache.   NODES: Denies enlarged lymph nodes.   CHEST: Denies chest pain or shortness of breath.   CARDIOVASCULAR: Denies palpitations or left sided chest pain.   ABDOMEN: No abdominal pain, constipation, diarrhea, nausea, vomiting or rectal bleeding.   URINARY: No frequency, dysuria, hematuria, or burning on urination.  HEMATOLOGIC: No easy bruisability or excessive bleeding.   MUSCULOSKELETAL: Denies joint pain or swelling.     BP 90/66   Ht 5' 7" (1.702 m)   Wt 80.3 kg (177 lb 0.5 oz)   LMP 2018   Physical Exam:  APPEARANCE: Well nourished, well developed, in no acute distress.  SKIN: Normal skin turgor, no lesions.  NECK: Neck symmetric without masses   RESPIRATORY: Normal respiratory effort with no retractions or use of accessory muscles  CARDIOVASCULAR: Peripheral vascular system with no swelling no varicosities and palpation of pulses normal  LYMPHATIC: No enlargements of the lymph nodes noted in the neck, axillae, or groin  ABDOMEN: Soft. No tenderness or masses. No hepatosplenomegaly. No hernias.  BREASTS: Symmetrical, no skin changes or visible lesions. No palpable masses, nipple discharge or adenopathy bilaterally.  PELVIC: Normal external female genitalia without lesions. Normal hair distribution. Adequate perineal body, normal urethral meatus. Urethra with no masses.  Bladder nontender. Vagina moist and well rugated without lesions or discharge. No significant cystocele or rectocele.  Adnexa without masses or " tenderness. Urethra and bladder normal.   EXTREMITIES: No clubbing cyanosis or edema.    ASSESSMENT/PLAN:  Encounter for gynecological examination without abnormal finding    Visit for screening mammogram  -     Mammo Digital Screening Bilat w/ Ja; Future; Expected date: 06/12/2023    S/P hysterectomy    Eczema, unspecified type          Patient was counseled today on Pap guidelines. We discussed the discontinuing the pap smear after hysterectomy except in certain high risk cases.  We discussed the need for pelvic exams.   We discussed STD screening if at high risk for an STD.  We discussed breast cancer screening with mammograms every other year after the age of 40 and annually after the age of 50.    We discussed colon cancer screening.   Osteoporosis screening with the Dexa Bone Scan discussed when indicated.   She will see her PCP for other health maintenance.       FOLLOW-UP:prn

## 2023-07-05 ENCOUNTER — PATIENT MESSAGE (OUTPATIENT)
Dept: OBSTETRICS AND GYNECOLOGY | Facility: CLINIC | Age: 43
End: 2023-07-05
Payer: COMMERCIAL

## 2023-07-05 DIAGNOSIS — L65.9 HAIR LOSS: Primary | ICD-10-CM

## 2023-07-06 ENCOUNTER — SPECIALTY PHARMACY (OUTPATIENT)
Dept: PHARMACY | Facility: CLINIC | Age: 43
End: 2023-07-06
Payer: COMMERCIAL

## 2023-07-06 ENCOUNTER — TELEPHONE (OUTPATIENT)
Dept: PHARMACY | Facility: CLINIC | Age: 43
End: 2023-07-06
Payer: COMMERCIAL

## 2023-07-06 ENCOUNTER — OFFICE VISIT (OUTPATIENT)
Dept: FAMILY MEDICINE | Facility: CLINIC | Age: 43
End: 2023-07-06
Payer: COMMERCIAL

## 2023-07-06 DIAGNOSIS — I10 ESSENTIAL HYPERTENSION: ICD-10-CM

## 2023-07-06 DIAGNOSIS — H69.92 DYSFUNCTION OF EUSTACHIAN TUBE, LEFT: Primary | ICD-10-CM

## 2023-07-06 DIAGNOSIS — S03.00XD DISLOCATION OF TEMPOROMANDIBULAR JOINT, SUBSEQUENT ENCOUNTER: ICD-10-CM

## 2023-07-06 PROCEDURE — 1160F RVW MEDS BY RX/DR IN RCRD: CPT | Mod: CPTII,95,, | Performed by: FAMILY MEDICINE

## 2023-07-06 PROCEDURE — 1159F PR MEDICATION LIST DOCUMENTED IN MEDICAL RECORD: ICD-10-PCS | Mod: CPTII,95,, | Performed by: FAMILY MEDICINE

## 2023-07-06 PROCEDURE — 1159F MED LIST DOCD IN RCRD: CPT | Mod: CPTII,95,, | Performed by: FAMILY MEDICINE

## 2023-07-06 PROCEDURE — 99213 OFFICE O/P EST LOW 20 MIN: CPT | Mod: 95,,, | Performed by: FAMILY MEDICINE

## 2023-07-06 PROCEDURE — 99213 PR OFFICE/OUTPT VISIT, EST, LEVL III, 20-29 MIN: ICD-10-PCS | Mod: 95,,, | Performed by: FAMILY MEDICINE

## 2023-07-06 PROCEDURE — 1160F PR REVIEW ALL MEDS BY PRESCRIBER/CLIN PHARMACIST DOCUMENTED: ICD-10-PCS | Mod: CPTII,95,, | Performed by: FAMILY MEDICINE

## 2023-07-06 RX ORDER — TIZANIDINE 4 MG/1
4 TABLET ORAL NIGHTLY
Qty: 10 TABLET | Refills: 1 | Status: SHIPPED | OUTPATIENT
Start: 2023-07-06 | End: 2023-07-27

## 2023-07-06 NOTE — TELEPHONE ENCOUNTER
Specialty Pharmacy - Refill Coordination    Specialty Medication Orders Linked to Encounter      Flowsheet Row Most Recent Value   Medication #1 etanercept (ENBREL SURECLICK) 50 mg/mL (1 mL) (Order#[id number], Rx#[id number])            Refill Questions - Documented Responses      Flowsheet Row Most Recent Value   Patient Availability and HIPAA Verification    Does patient want to proceed with activity? Yes   HIPAA/medical authority confirmed? Yes   Relationship to patient of person spoken to? Self   Refill Screening Questions    Changes to allergies? No   Changes to medications? No   New conditions since last clinic visit? No   Unplanned office visit, urgent care, ED, or hospital admission in the last 4 weeks? No   How does patient/caregiver feel medication is working? Good   Financial problems or insurance changes? No   How many doses of your specialty medications were missed in the last 4 weeks? 0   Would patient like to speak to a pharmacist? No   When does the patient need to receive the medication? 07/16/23   Refill Delivery Questions    How will the patient receive the medication? [facility]   When does the patient need to receive the medication? 07/16/23   Shipping Address Home   Address in [facility] confirmed and updated if neccessary? Yes   Expected Copay ($) 40   Is the patient able to afford the medication copay? Yes   Payment Method  CC on file   Days supply of Refill 28   Supplies needed? No supplies needed   Refill activity completed? Yes   Refill activity plan Refill scheduled   Shipment/Pickup Date: 07/12/23            Current Outpatient Medications   Medication Sig    albuterol (PROVENTIL) 2.5 mg /3 mL (0.083 %) nebulizer solution Take 3 mLs (2.5 mg total) by nebulization every 6 (six) hours as needed for Wheezing. Rescue    albuterol (PROVENTIL/VENTOLIN HFA) 90 mcg/actuation inhaler Inhale 2 puffs into the lungs every 4 (four) hours as needed for Wheezing or Shortness of Breath.  Rescue    azelastine (ASTELIN) 137 mcg (0.1 %) nasal spray Use 1 spray (137 mcg total) in each nostril 2 (two) times daily.    buPROPion (WELLBUTRIN XL) 300 MG 24 hr tablet Take 300 mg by mouth once daily.    buPROPion (WELLBUTRIN XL) 300 MG 24 hr tablet 1 tablet Orally Once a day 90 days    clobetasoL (TEMOVATE) 0.05 % external solution Apply topically 2 (two) times daily. Apply to the scalp    clonazePAM (KLONOPIN) 0.5 MG tablet Take 0.5 mg by mouth daily as needed.    DUPIXENT  mg/2 mL PnIj Inject into the skin.    EPICERAM Ramon Apply topically 2 (two) times daily.    etanercept (ENBREL SURECLICK) 50 mg/mL (1 mL) Inject 1 mL (50 mg total) into the skin every 7 days.    fluticasone propionate (FLONASE) 50 mcg/actuation nasal spray SHAKE LIQUID AND USE 2 SPRAYS(100 MCG) IN EACH NOSTRIL EVERY DAY FOR 14 DAYS    fluticasone-salmeterol diskus inhaler 250-50 mcg Inhale 1 puff into the lungs 2 (two) times daily. Controller    fremanezumab-vfrm (AJOVY AUTOINJECTOR) 225 mg/1.5 mL autoinjector Inject 1.5 mL (225 mg total) into the skin every 30 days.    montelukast (SINGULAIR) 10 mg tablet TAKE 1 TABLET(10 MG) BY MOUTH EVERY EVENING    nebulizer accessories Kit 1 kit by Misc.(Non-Drug; Combo Route) route daily as needed.    pantoprazole (PROTONIX) 40 MG tablet TAKE 1 TABLET BY MOUTH DAILY    plecanatide (TRULANCE) 3 mg Tab Take 3 mg by mouth Daily.    propranoloL (INDERAL LA) 80 MG 24 hr capsule TAKE 1 CAPSULE(80 MG) BY MOUTH EVERY NIGHT.    semaglutide, weight loss, (WEGOVY) 0.25 mg/0.5 mL PnIj Inject 0.25 mg into the skin every 7 days.    tacrolimus (PROTOPIC) 0.1 % ointment Apply 1 application topically 2 (two) times daily.    topiramate (TOPAMAX) 100 MG tablet TAKE 1 TABLET(100 MG) BY MOUTH TWICE DAILY    topiramate (TOPAMAX) 100 MG tablet Take 1 tablet by mouth every night two hours before bed    ubrogepant (UBRELVY) 100 mg tablet Take 1 tablet by mouth at onset of headache. Take second tablet 2 hours later, if  needed. No more than 2 tablets/day OR 3 days use per week.    varicella-zoster gE-AS01B, PF, (SHINGRIX) 50 mcg/0.5 mL injection Inject into the muscle.    venlafaxine (EFFEXOR-XR) 150 MG Cp24 Take 150 mg by mouth once daily.    VIOS AEROSOL DELIVERY SYSTEM Larissa USE DAILY AS NEEDED   Last reviewed on 6/12/2023 11:25 AM by Yvette Power LPN    Review of patient's allergies indicates:   Allergen Reactions    Pertussis vaccines     Decongestant allergy Palpitations    Last reviewed on  6/12/2023 11:24 AM by Yvette Power      Tasks added this encounter   No tasks added.   Tasks due within next 3 months   No tasks due.     [facility]  [address]  [facility] [address]  Phone: [phone]  Fax: [phone]

## 2023-07-10 NOTE — PROGRESS NOTES
[facility]     Subjective:    Chief Complaint: No chief complaint on file.      History of Present Illness:  43 y.o. female presents for multiple issues.         I reviewed the patients chart dating back for the past few appointments. See above.    The following portions of the patient's history were reviewed and updated as appropriate: allergies, current medications, past family history, past medical history, past social history, past surgical history and problem list.    She denies chest pain upon exertion, dyspnea, nausea, vomiting, diaphoresis, and syncope. No pleuritic chest pain, unilateral leg swelling, calf tenderness, or calf pain.      Past Medical History:   Diagnosis Date    Allergy     Anemia     Asthma     Chronic diarrhea     Elevated C-reactive protein (CRP)     Fibromyalgia     GERD (gastroesophageal reflux disease)     Headache(784.0)     Irritable bowel syndrome     Mental disorder     Bipolar Disorder, Anxiety, Depression    Motion sickness     Motion sickness     PCOS (polycystic ovarian syndrome)     Rheumatoid arthritis     Seizures     childhood       Past Surgical History:   Procedure Laterality Date    COLONOSCOPY N/A 12/21/2022    Procedure: COLONOSCOPY;  Surgeon: Mikala Parker MD;  Location: [facility];  Service: Endoscopy;  Laterality: N/A;    HYSTERECTOMY      LASER LAPAROSCOPY      left hip surgery had hardware replaced then removed      SINUS SURGERY         Social History  Social History     Tobacco Use    Smoking status: Never    Smokeless tobacco: Never   Substance Use Topics    Alcohol use: No    Drug use: No       Family History   Problem Relation Age of Onset    Rheum arthritis Maternal Grandmother     Bipolar disorder Other     Cataracts Mother     Cataracts Maternal Grandfather     Asthma Maternal Aunt     Alcohol abuse Maternal Aunt     Depression Maternal Aunt     Diabetes Mellitus Maternal Aunt     Heart disease Maternal Aunt     Alcohol abuse Maternal Uncle   "   Bipolar disorder Maternal Uncle     Heart failure Maternal Uncle     Breast cancer Neg Hx     Ovarian cancer Neg Hx     Migraines Neg Hx      Review of patient's allergies indicates:   Allergen Reactions    Pertussis vaccines     Decongestant allergy Palpitations       Review of Systems [Negative unless checked off]    General ROS: []fever, []chills, []weight loss, [x]malaise/fatigue.  ENT ROS: [x]congestion, [x]rhinorrhea,  []sore throat, []neck pain, []hearing loss.  Ophthalmic ROS:[]blurry vision, [] double vision, []photophobia, []eye pain.  Respiratory ROS: []cough, []pleuritic chest pain, []shortness of breath, []wheezing.  CVS ROS:[]chest pain, []dyspnea on exertion, []palpitations, []orthopnea, []leg swelling, []PND.   GI ROS: []nausea, []vomiting, [] epigastric pain, []abd pain, []diarrhea, []constipation, []blood/melena in stool.   Urology ROS:[]dysuria, []frequency, []flank pain,[] trouble voiding, [] hematuria.   MSK ROS: [x]myalgias, [x]joint pain, []muscular weakness,  []back pain, [] falls.   Derm ROS: []pruritis, []rash, []jaundice.  Neurological:[]dizziness,[]numbness,[]loss of consciousness, []weakness  []headaches.   Psych ROS: []hallucinations, []depression, []anxiety, []suicidal ideation.    Physical Examination  LMP 05/23/2018   Wt Readings from Last 3 Encounters:   06/12/23 80.3 kg (177 lb 0.5 oz)   04/25/23 82.5 kg (181 lb 14.1 oz)   04/05/23 83.3 kg (183 lb 8.5 oz)     BP Readings from Last 3 Encounters:   06/12/23 90/66   04/25/23 110/80   04/05/23 124/87     Estimated body mass index is 27.73 kg/m² as calculated from the following:    Height as of 6/12/23: 5' 7" (1.702 m).    Weight as of 6/12/23: 80.3 kg (177 lb 0.5 oz).     General appearance: alert, cooperative, no distress  Eyes: pupils equal and reactive, extraocular eye movements intact   Ears: bilateral TM's and external ear canals normal   Nose: normal and patent, no erythema, discharge or polyps   Sinuses: Normal paranasal " sinuses without tenderness   Throat: mucous membranes moist, pharynx normal without lesions   Neck: no thyromegaly, trachea midline  Lungs: clear to auscultation, no wheezes, rales or rhonchi, symmetric air entry, no dullness to percussion bilaterally.  Hea  Data reviewed    Previous medical records reviewed and summarized above in HPI. 274}    Laboratory    I have reviewed old labs below:   274}  Lab Results   Component Value Date    WBC 9.59 04/05/2023    HGB 12.7 04/05/2023    HCT 40.7 04/05/2023    MCV 88 04/05/2023     04/05/2023     04/05/2023    K 3.8 04/05/2023     04/05/2023    CALCIUM 9.5 04/05/2023    CO2 21 (L) 04/05/2023    GLU 83 04/05/2023    BUN 11 04/05/2023    CREATININE 0.9 04/05/2023    ANIONGAP 10 04/05/2023    ESTGFRAFRICA >60.0 06/10/2022    EGFRNONAA >60.0 06/10/2022    PROT 7.5 04/05/2023    ALBUMIN 3.8 04/05/2023    BILITOT 0.4 04/05/2023    ALKPHOS 105 04/05/2023    ALT 10 04/05/2023    AST 13 04/05/2023    CHOL 181 01/17/2023    TRIG 190 (H) 01/17/2023    HDL 37 (L) 01/17/2023    LDLCALC 106.0 01/17/2023    TSH 0.557 02/23/2023       Imaging/Tracing: I have reviewed the pertinent results/findings and my personal findings are below:  274}    Assessment/Plan     274}    Pura Gudino is a 43 y.o. female who presents to clinic with:    1. Dysfunction of Eustachian tube, left    2. Dislocation of temporomandibular joint, subsequent encounter    3. Essential hypertension         Diagnostic impression remarks       1. Dysfunction of Eustachian tube, left    2. Dislocation of temporomandibular joint, subsequent encounter  - tiZANidine (ZANAFLEX) 4 MG tablet; Take 1 tablet (4 mg total) by mouth every evening for 10 days  Dispense: 10 tablet; Refill: 1    3. Essential hypertension      Medication Monitoring: In today's visit, monitoring for drug toxicity was accomplished. Proper use of medications was discussed.     Counseling: Counseling included discussion regarding  imaging findings, diagnosis, possibilities, treatment options, medications, risks, and benefits. She had many questions regarding the options and long-term effects. All questions were answered. She expressed understanding after counseling regarding the diagnosis and recommendations. She was capable and demonstrated competence with understanding of these options. Shared decision making was performed resulting in her choosing the current treatment plan.     She was counseled about the importance of healthy dietary habits as well as routine physical activity and exercise for better health outcomes. I also discussed the importance of cancer screening.     I also discussed the importance of close follow up to discuss labs, change or modify her medications if needed, monitor side effects, and further evaluation of medical problems.     Additional workup planned: see labs ordered below.    See below for labs and meds ordered with associated diagnosis      Medication List with Changes/Refills   New Medications    TIZANIDINE (ZANAFLEX) 4 MG TABLET    Take 1 tablet (4 mg total) by mouth every evening for 10 days   Current Medications    ALBUTEROL (PROVENTIL) 2.5 MG /3 ML (0.083 %) NEBULIZER SOLUTION    Take 3 mLs (2.5 mg total) by nebulization every 6 (six) hours as needed for Wheezing. Rescue    ALBUTEROL (PROVENTIL/VENTOLIN HFA) 90 MCG/ACTUATION INHALER    Inhale 2 puffs into the lungs every 4 (four) hours as needed for Wheezing or Shortness of Breath. Rescue    AZELASTINE (ASTELIN) 137 MCG (0.1 %) NASAL SPRAY    Use 1 spray (137 mcg total) in each nostril 2 (two) times daily.    BUPROPION (WELLBUTRIN XL) 300 MG 24 HR TABLET    Take 300 mg by mouth once daily.    BUPROPION (WELLBUTRIN XL) 300 MG 24 HR TABLET    1 tablet Orally Once a day 90 days    CLOBETASOL (TEMOVATE) 0.05 % EXTERNAL SOLUTION    Apply topically 2 (two) times daily. Apply to the scalp    CLONAZEPAM (KLONOPIN) 0.5 MG TABLET    Take 0.5 mg by mouth daily as  needed.    DUPIXENT  MG/2 ML PNIJ    Inject into the skin.    EPICERAM VINOD    Apply topically 2 (two) times daily.    ETANERCEPT (ENBREL SURECLICK) 50 MG/ML (1 ML)    Inject 1 mL (50 mg total) into the skin every 7 days.    FLUTICASONE PROPIONATE (FLONASE) 50 MCG/ACTUATION NASAL SPRAY    SHAKE LIQUID AND USE 2 SPRAYS(100 MCG) IN EACH NOSTRIL EVERY DAY FOR 14 DAYS    FLUTICASONE-SALMETEROL DISKUS INHALER 250-50 MCG    Inhale 1 puff into the lungs 2 (two) times daily. Controller    FREMANEZUMAB-VFRM (AJOVY AUTOINJECTOR) 225 MG/1.5 ML AUTOINJECTOR    Inject 1.5 mL (225 mg total) into the skin every 30 days.    MONTELUKAST (SINGULAIR) 10 MG TABLET    TAKE 1 TABLET(10 MG) BY MOUTH EVERY EVENING    NEBULIZER ACCESSORIES KIT    1 kit by Misc.(Non-Drug; Combo Route) route daily as needed.    PANTOPRAZOLE (PROTONIX) 40 MG TABLET    TAKE 1 TABLET BY MOUTH DAILY    PROPRANOLOL (INDERAL LA) 80 MG 24 HR CAPSULE    TAKE 1 CAPSULE(80 MG) BY MOUTH EVERY NIGHT.    SEMAGLUTIDE, WEIGHT LOSS, (WEGOVY) 0.25 MG/0.5 ML PNIJ    Inject 0.25 mg into the skin every 7 days.    TACROLIMUS (PROTOPIC) 0.1 % OINTMENT    Apply 1 application topically 2 (two) times daily.    TOPIRAMATE (TOPAMAX) 100 MG TABLET    TAKE 1 TABLET(100 MG) BY MOUTH TWICE DAILY    TOPIRAMATE (TOPAMAX) 100 MG TABLET    Take 1 tablet by mouth every night two hours before bed    UBROGEPANT (UBRELVY) 100 MG TABLET    Take 1 tablet by mouth at onset of headache. Take second tablet 2 hours later, if needed. No more than 2 tablets/day OR 3 days use per week.    VARICELLA-ZOSTER GE-AS01B, PF, (SHINGRIX) 50 MCG/0.5 ML INJECTION    Inject into the muscle.    VENLAFAXINE (EFFEXOR-XR) 150 MG CP24    Take 150 mg by mouth once daily.    VIOS AEROSOL DELIVERY SYSTEM SAULO    USE DAILY AS NEEDED   Discontinued Medications    PLECANATIDE (TRULANCE) 3 MG TAB    Take 3 mg by mouth Daily.     Modified Medications    No medications on file       No follow-ups on file. for further workup  "and reassessment if labs and tests obtained are stable or sooner as needed. She was instructed to call the clinic or go to the emergency department if her symptoms do not improve, worsens, or if new symptoms develop. Patient knows to call any time if an emergency arises. Shared decision making occurred and she verbalized understanding in agreement with this plan.     Documentation entered by me for this encounter may have been done in part using speech-recognition technology. Although I have made an effort to ensure accuracy, "sound like" errors may exist and should be interpreted in context.       Glen Kirk MD     Discussed health maintenance guidelines appropriate for age.  Answers submitted by the patient for this visit:  Ear Pain Questionnaire (Submitted on 7/6/2023)  Chief Complaint: Ear pain  Affected ear: left  Chronicity: new  Onset: more than 1 month ago  Progression since onset: gradually worsening  Frequency: constantly  Fever: 100.4 - 100.9 F  Fever duration: less than 1 day  Pain - numeric: 6/10  abdominal pain: No  ear discharge: No  rash: No  cough: Yes  headaches: Yes  rhinorrhea: No  diarrhea: Yes  hearing loss: Yes  sore throat: No  neck pain: No  vomiting: No  drainage: Yes  Treatments tried: NSAIDs, cold packs, heat packs  Improvement on treatment: moderate  Pain severity: moderate  chronic ear infection: No  hearing loss: No  tympanostomy tube: No    "

## 2023-07-11 ENCOUNTER — OFFICE VISIT (OUTPATIENT)
Dept: OTOLARYNGOLOGY | Facility: CLINIC | Age: 43
End: 2023-07-11
Payer: COMMERCIAL

## 2023-07-11 VITALS
SYSTOLIC BLOOD PRESSURE: 110 MMHG | HEART RATE: 87 BPM | HEIGHT: 67 IN | BODY MASS INDEX: 26.99 KG/M2 | DIASTOLIC BLOOD PRESSURE: 79 MMHG | WEIGHT: 171.94 LBS

## 2023-07-11 DIAGNOSIS — J30.2 SEASONAL ALLERGIES: ICD-10-CM

## 2023-07-11 DIAGNOSIS — M26.629 TMJ SYNDROME: ICD-10-CM

## 2023-07-11 DIAGNOSIS — H65.02 NON-RECURRENT ACUTE SEROUS OTITIS MEDIA OF LEFT EAR: Primary | ICD-10-CM

## 2023-07-11 PROCEDURE — 3078F DIAST BP <80 MM HG: CPT | Mod: CPTII,S$GLB,, | Performed by: PHYSICIAN ASSISTANT

## 2023-07-11 PROCEDURE — 3008F BODY MASS INDEX DOCD: CPT | Mod: CPTII,S$GLB,, | Performed by: PHYSICIAN ASSISTANT

## 2023-07-11 PROCEDURE — 1160F RVW MEDS BY RX/DR IN RCRD: CPT | Mod: CPTII,S$GLB,, | Performed by: PHYSICIAN ASSISTANT

## 2023-07-11 PROCEDURE — 3074F PR MOST RECENT SYSTOLIC BLOOD PRESSURE < 130 MM HG: ICD-10-PCS | Mod: CPTII,S$GLB,, | Performed by: PHYSICIAN ASSISTANT

## 2023-07-11 PROCEDURE — 1159F MED LIST DOCD IN RCRD: CPT | Mod: CPTII,S$GLB,, | Performed by: PHYSICIAN ASSISTANT

## 2023-07-11 PROCEDURE — 3074F SYST BP LT 130 MM HG: CPT | Mod: CPTII,S$GLB,, | Performed by: PHYSICIAN ASSISTANT

## 2023-07-11 PROCEDURE — 99999 PR PBB SHADOW E&M-EST. PATIENT-LVL V: ICD-10-PCS | Mod: PBBFAC,,, | Performed by: PHYSICIAN ASSISTANT

## 2023-07-11 PROCEDURE — 3078F PR MOST RECENT DIASTOLIC BLOOD PRESSURE < 80 MM HG: ICD-10-PCS | Mod: CPTII,S$GLB,, | Performed by: PHYSICIAN ASSISTANT

## 2023-07-11 PROCEDURE — 99203 PR OFFICE/OUTPT VISIT, NEW, LEVL III, 30-44 MIN: ICD-10-PCS | Mod: S$GLB,,, | Performed by: PHYSICIAN ASSISTANT

## 2023-07-11 PROCEDURE — 99999 PR PBB SHADOW E&M-EST. PATIENT-LVL V: CPT | Mod: PBBFAC,,, | Performed by: PHYSICIAN ASSISTANT

## 2023-07-11 PROCEDURE — 99203 OFFICE O/P NEW LOW 30 MIN: CPT | Mod: S$GLB,,, | Performed by: PHYSICIAN ASSISTANT

## 2023-07-11 PROCEDURE — 3008F PR BODY MASS INDEX (BMI) DOCUMENTED: ICD-10-PCS | Mod: CPTII,S$GLB,, | Performed by: PHYSICIAN ASSISTANT

## 2023-07-11 PROCEDURE — 1160F PR REVIEW ALL MEDS BY PRESCRIBER/CLIN PHARMACIST DOCUMENTED: ICD-10-PCS | Mod: CPTII,S$GLB,, | Performed by: PHYSICIAN ASSISTANT

## 2023-07-11 PROCEDURE — 1159F PR MEDICATION LIST DOCUMENTED IN MEDICAL RECORD: ICD-10-PCS | Mod: CPTII,S$GLB,, | Performed by: PHYSICIAN ASSISTANT

## 2023-07-11 NOTE — PATIENT INSTRUCTIONS
Pop ears 6 times a day for the next 1 month. Use flonase 1 spray to each nostril twice daily for 1 month. Continue xyzal 5mg at night. Follow up in 1 month for audiogram and to recheck left ear to ensure that left middle ear fluid has resolved.     TMJ Syndrome  This is a condition with chronic or recurrent pain in the joint of the jaw (in front of the ear). The pain may cause limited motion of the jaw, a locking or catching sensation, clicking, popping or grinding sounds from the joint with movement. It may also lead to headache, earache or neck pain. It is sometimes caused by inflammation in the joint, injury or wear-and-tear of the cartilage in the joint, involuntary grinding of the teeth or poorly fitting dentures. Emotional stress and tension are often a factor. Most cases resolve completely within a few months with proper treatment.    Home Care:  1) Rest the jaw by avoiding crunchy or hard foods to chew. Do not eat hard or sticky candies. Soft foods and liquids are easier on the jaw. Protect your jaw while yawning.    2) Hot packs (small towel soaked in hot water) applied to the jaw may give relief by reducing muscle spasm. You may use a heating pad or a towel soaked in hot water. Some people get relief with cold packs, so try both and see which one works best for you.    3) You may use ibuprofen (Motrin, Advil) to control pain, unless another medicine was prescribed. [ NOTE : If you have chronic liver or kidney disease or ever had a stomach ulcer or GI bleeding, talk with your doctor before using these medicines.]  - Typical dosing in an adult for anti-inflammatory     4) If you suspect emotional stress is related to your condition:    a) Try to identify the sources of stress in your life. It may not be obvious! These may include:  -- Daily hassles of life that pile up (traffic jams, missed appointments, car troubles)  -- Major life changes, both good (new baby, job promotion) and bad (loss of job, loss of  loved one)  -- Overload: feeling that you have too many responsibilities and can't take care of everything at once  -- Helplessness: feeling like your problems are more than you can solve    b) When possible, do something about the source of your stress: avoid hassles, limit the amount of change that is happening in your life at one time and take a break when you feel overloaded.    c) Unfortunately, many stressful situations cannot be avoided. Therefore, it is necessary to learn HOW TO MANAGE STRESS better. There are many proven methods that work and will reduce your anxiety. These include simple things like exercise, good nutrition and adequate rest. Also, there are certain techniques that are helpful: relaxation and breathing exercises, visualization, biofeedback, meditation or simply taking some time-out to clear your mind. For more information about this, consult your doctor or go to a local bookstore and review the many books and tapes available on this subject.

## 2023-07-12 ENCOUNTER — LAB VISIT (OUTPATIENT)
Dept: LAB | Facility: HOSPITAL | Age: 43
End: 2023-07-12
Attending: PHYSICIAN ASSISTANT
Payer: COMMERCIAL

## 2023-07-12 DIAGNOSIS — D84.821 DRUG-INDUCED IMMUNODEFICIENCY: ICD-10-CM

## 2023-07-12 DIAGNOSIS — J30.2 SEASONAL ALLERGIES: ICD-10-CM

## 2023-07-12 DIAGNOSIS — Z79.899 DRUG-INDUCED IMMUNODEFICIENCY: ICD-10-CM

## 2023-07-12 DIAGNOSIS — M06.00 SERONEGATIVE RHEUMATOID ARTHRITIS: ICD-10-CM

## 2023-07-12 DIAGNOSIS — L65.9 HAIR LOSS: ICD-10-CM

## 2023-07-12 LAB
ALBUMIN SERPL BCP-MCNC: 4.1 G/DL (ref 3.5–5.2)
ALP SERPL-CCNC: 112 U/L (ref 55–135)
ALT SERPL W/O P-5'-P-CCNC: 10 U/L (ref 10–44)
ANION GAP SERPL CALC-SCNC: 6 MMOL/L (ref 8–16)
AST SERPL-CCNC: 14 U/L (ref 10–40)
BASOPHILS # BLD AUTO: 0.06 K/UL (ref 0–0.2)
BASOPHILS NFR BLD: 0.7 % (ref 0–1.9)
BILIRUB SERPL-MCNC: 1 MG/DL (ref 0.1–1)
BUN SERPL-MCNC: 10 MG/DL (ref 6–20)
CALCIUM SERPL-MCNC: 10.1 MG/DL (ref 8.7–10.5)
CHLORIDE SERPL-SCNC: 107 MMOL/L (ref 95–110)
CO2 SERPL-SCNC: 23 MMOL/L (ref 23–29)
CREAT SERPL-MCNC: 1 MG/DL (ref 0.5–1.4)
CRP SERPL-MCNC: 8.1 MG/L (ref 0–8.2)
DIFFERENTIAL METHOD: ABNORMAL
EOSINOPHIL # BLD AUTO: 0.1 K/UL (ref 0–0.5)
EOSINOPHIL NFR BLD: 1.1 % (ref 0–8)
ERYTHROCYTE [DISTWIDTH] IN BLOOD BY AUTOMATED COUNT: 13.5 % (ref 11.5–14.5)
ERYTHROCYTE [SEDIMENTATION RATE] IN BLOOD BY PHOTOMETRIC METHOD: 24 MM/HR (ref 0–36)
EST. GFR  (NO RACE VARIABLE): >60 ML/MIN/1.73 M^2
ESTRADIOL SERPL-MCNC: 313 PG/ML
GLUCOSE SERPL-MCNC: 92 MG/DL (ref 70–110)
HCT VFR BLD AUTO: 45.7 % (ref 37–48.5)
HGB BLD-MCNC: 14.4 G/DL (ref 12–16)
IGE SERPL-ACNC: <35 IU/ML (ref 0–100)
IMM GRANULOCYTES # BLD AUTO: 0.02 K/UL (ref 0–0.04)
IMM GRANULOCYTES NFR BLD AUTO: 0.2 % (ref 0–0.5)
LYMPHOCYTES # BLD AUTO: 3.2 K/UL (ref 1–4.8)
LYMPHOCYTES NFR BLD: 35.6 % (ref 18–48)
MCH RBC QN AUTO: 27.9 PG (ref 27–31)
MCHC RBC AUTO-ENTMCNC: 31.5 G/DL (ref 32–36)
MCV RBC AUTO: 88 FL (ref 82–98)
MONOCYTES # BLD AUTO: 0.5 K/UL (ref 0.3–1)
MONOCYTES NFR BLD: 5.2 % (ref 4–15)
NEUTROPHILS # BLD AUTO: 5.2 K/UL (ref 1.8–7.7)
NEUTROPHILS NFR BLD: 57.2 % (ref 38–73)
NRBC BLD-RTO: 0 /100 WBC
PLATELET # BLD AUTO: 326 K/UL (ref 150–450)
PMV BLD AUTO: 11.5 FL (ref 9.2–12.9)
POTASSIUM SERPL-SCNC: 4 MMOL/L (ref 3.5–5.1)
PROGEST SERPL-MCNC: 0.5 NG/ML
PROT SERPL-MCNC: 8.1 G/DL (ref 6–8.4)
RBC # BLD AUTO: 5.17 M/UL (ref 4–5.4)
SODIUM SERPL-SCNC: 136 MMOL/L (ref 136–145)
WBC # BLD AUTO: 9.08 K/UL (ref 3.9–12.7)

## 2023-07-12 PROCEDURE — 82785 ASSAY OF IGE: CPT | Performed by: PHYSICIAN ASSISTANT

## 2023-07-12 PROCEDURE — 86003 ALLG SPEC IGE CRUDE XTRC EA: CPT | Mod: 59 | Performed by: PHYSICIAN ASSISTANT

## 2023-07-12 PROCEDURE — 86140 C-REACTIVE PROTEIN: CPT | Performed by: STUDENT IN AN ORGANIZED HEALTH CARE EDUCATION/TRAINING PROGRAM

## 2023-07-12 PROCEDURE — 85652 RBC SED RATE AUTOMATED: CPT | Performed by: STUDENT IN AN ORGANIZED HEALTH CARE EDUCATION/TRAINING PROGRAM

## 2023-07-12 PROCEDURE — 85025 COMPLETE CBC W/AUTO DIFF WBC: CPT | Performed by: STUDENT IN AN ORGANIZED HEALTH CARE EDUCATION/TRAINING PROGRAM

## 2023-07-12 PROCEDURE — 86003 ALLG SPEC IGE CRUDE XTRC EA: CPT | Performed by: PHYSICIAN ASSISTANT

## 2023-07-12 PROCEDURE — 84144 ASSAY OF PROGESTERONE: CPT | Performed by: OBSTETRICS & GYNECOLOGY

## 2023-07-12 PROCEDURE — 82670 ASSAY OF TOTAL ESTRADIOL: CPT | Performed by: OBSTETRICS & GYNECOLOGY

## 2023-07-12 PROCEDURE — 36415 COLL VENOUS BLD VENIPUNCTURE: CPT | Mod: PO | Performed by: OBSTETRICS & GYNECOLOGY

## 2023-07-12 PROCEDURE — 83001 ASSAY OF GONADOTROPIN (FSH): CPT | Performed by: OBSTETRICS & GYNECOLOGY

## 2023-07-12 PROCEDURE — 80053 COMPREHEN METABOLIC PANEL: CPT | Performed by: STUDENT IN AN ORGANIZED HEALTH CARE EDUCATION/TRAINING PROGRAM

## 2023-07-13 LAB — FSH SERPL-ACNC: 9.22 MIU/ML

## 2023-07-14 LAB
AMER SYCAMORE IGE QN: <0.1 KU/L
FEATHER PANEL #2: <0.1 KU/L

## 2023-07-18 LAB
A ALTERNATA IGE QN: <0.1 KU/L
A FUMIGATUS IGE QN: <0.1 KU/L
ALLERGEN BOXELDER MAPLE TREE IGE: <0.1 KU/L
ALLERGEN MAPLE (BOX ELDER) CLASS: NORMAL
ALLERGEN MULBERRY CLASS: NORMAL
ALLERGEN MULBERRY TREE IGE: <0.1 KU/L
ALLERGEN PIGWEED IGE: <0.1 KU/L
ALLERGEN WHITE ASH TREE IGE: <0.1 KU/L
BALD CYPRESS IGE QN: <0.1 KU/L
BERMUDA GRASS IGE QN: <0.1 KU/L
C HERBARUM IGE QN: <0.1 KU/L
CAT DANDER IGE QN: <0.1 KU/L
CEDAR IGE QN: <0.1 KU/L
COCKLEBUR IGE QN: <0.1 KU/L
COMMON PIGWEED CLASS: NORMAL
COMMON RAGWEED IGE QN: <0.1 KU/L
D FARINAE IGE QN: <0.1 KU/L
D PTERONYSS IGE QN: <0.1 KU/L
DEPRECATED A ALTERNATA IGE RAST QL: NORMAL
DEPRECATED A FUMIGATUS IGE RAST QL: NORMAL
DEPRECATED BALD CYPRESS IGE RAST QL: NORMAL
DEPRECATED BERMUDA GRASS IGE RAST QL: NORMAL
DEPRECATED C HERBARUM IGE RAST QL: NORMAL
DEPRECATED CAT DANDER IGE RAST QL: NORMAL
DEPRECATED CEDAR IGE RAST QL: NORMAL
DEPRECATED COCKLEBUR IGE RAST QL: NORMAL
DEPRECATED COMMON RAGWEED IGE RAST QL: NORMAL
DEPRECATED D FARINAE IGE RAST QL: NORMAL
DEPRECATED D PTERONYSS IGE RAST QL: NORMAL
DEPRECATED DOG DANDER IGE RAST QL: NORMAL
DEPRECATED ELDER IGE RAST QL: NORMAL
DEPRECATED ENGL PLANTAIN IGE RAST QL: NORMAL
DEPRECATED GOOSEFOOT IGE RAST QL: NORMAL
DEPRECATED JOHNSON GRASS IGE RAST QL: NORMAL
DEPRECATED KENT BLUE GRASS IGE RAST QL: NORMAL
DEPRECATED M RACEMOSUS IGE RAST QL: NORMAL
DEPRECATED MUGWORT IGE RAST QL: NORMAL
DEPRECATED NETTLE IGE RAST QL: NORMAL
DEPRECATED P NOTATUM IGE RAST QL: NORMAL
DEPRECATED PECAN/HICK TREE IGE RAST QL: NORMAL
DEPRECATED ROACH IGE RAST QL: NORMAL
DEPRECATED SHEEP SORREL IGE RAST QL: NORMAL
DEPRECATED SILVER BIRCH IGE RAST QL: NORMAL
DEPRECATED TIMOTHY IGE RAST QL: NORMAL
DEPRECATED WHITE OAK IGE RAST QL: NORMAL
DOG DANDER IGE QN: <0.1 KU/L
ELDER IGE QN: <0.1 KU/L
ELM CEDAR CLASS: NORMAL
ELM CEDAR, IGE: <0.1 KU/L
ENGL PLANTAIN IGE QN: <0.1 KU/L
GOOSEFOOT IGE QN: <0.1 KU/L
JOHNSON GRASS IGE QN: <0.1 KU/L
KENT BLUE GRASS IGE QN: <0.1 KU/L
M RACEMOSUS IGE QN: <0.1 KU/L
MUGWORT IGE QN: <0.1 KU/L
NETTLE IGE QN: <0.1 KU/L
P NOTATUM IGE QN: <0.1 KU/L
PECAN/HICK TREE IGE QN: <0.1 KU/L
ROACH IGE QN: <0.1 KU/L
SHEEP SORREL IGE QN: <0.1 KU/L
SILVER BIRCH IGE QN: <0.1 KU/L
TIMOTHY IGE QN: <0.1 KU/L
WHITE ASH CLASS: NORMAL
WHITE OAK IGE QN: <0.1 KU/L

## 2023-07-19 ENCOUNTER — PATIENT MESSAGE (OUTPATIENT)
Dept: RHEUMATOLOGY | Facility: CLINIC | Age: 43
End: 2023-07-19
Payer: COMMERCIAL

## 2023-07-19 ENCOUNTER — PATIENT MESSAGE (OUTPATIENT)
Dept: OTOLARYNGOLOGY | Facility: CLINIC | Age: 43
End: 2023-07-19
Payer: COMMERCIAL

## 2023-07-21 ENCOUNTER — TELEPHONE (OUTPATIENT)
Dept: OTOLARYNGOLOGY | Facility: CLINIC | Age: 43
End: 2023-07-21
Payer: COMMERCIAL

## 2023-07-21 DIAGNOSIS — Z91.09 ENVIRONMENTAL ALLERGIES: Primary | ICD-10-CM

## 2023-07-24 ENCOUNTER — PATIENT MESSAGE (OUTPATIENT)
Dept: FAMILY MEDICINE | Facility: CLINIC | Age: 43
End: 2023-07-24
Payer: COMMERCIAL

## 2023-07-25 ENCOUNTER — TELEPHONE (OUTPATIENT)
Dept: ALLERGY | Facility: CLINIC | Age: 43
End: 2023-07-25
Payer: COMMERCIAL

## 2023-07-30 DIAGNOSIS — J30.1 SEASONAL ALLERGIC RHINITIS DUE TO POLLEN: ICD-10-CM

## 2023-07-30 NOTE — TELEPHONE ENCOUNTER
Care Due:                  Date            Visit Type   Department     Provider  --------------------------------------------------------------------------------                                MYCHART                              FOLLOWUP/OF  [facility]  Last Visit: 01-      FICE VISIT   MEDICINE       Berenice Garrido  Next Visit: None Scheduled  None         None Found                                                            Last  Test          Frequency    Reason                     Performed    Due Date  --------------------------------------------------------------------------------    HBA1C.......  6 months...  semaglutide,.............  Not Found    Overdue    [employer] Embedded Care Due Messages. Reference number: [id number].   7/30/2023 5:08:36 AM CDT

## 2023-07-31 RX ORDER — AZELASTINE 1 MG/ML
1 SPRAY, METERED NASAL 2 TIMES DAILY
Qty: 30 ML | Refills: 0 | Status: SHIPPED | OUTPATIENT
Start: 2023-07-31 | End: 2023-11-02 | Stop reason: SDUPTHER

## 2023-08-02 ENCOUNTER — SPECIALTY PHARMACY (OUTPATIENT)
Dept: PHARMACY | Facility: CLINIC | Age: 43
End: 2023-08-02
Payer: COMMERCIAL

## 2023-08-02 NOTE — TELEPHONE ENCOUNTER
Specialty Pharmacy - Refill Coordination    Specialty Medication Orders Linked to Encounter      Flowsheet Row Most Recent Value   Medication #1 etanercept (ENBREL SURECLICK) 50 mg/mL (1 mL) (Order#[id number], Rx#[id number])            Refill Questions - Documented Responses      Flowsheet Row Most Recent Value   Patient Availability and HIPAA Verification    HIPAA/medical authority confirmed? Yes   Relationship to patient of person spoken to? Self   Refill Screening Questions    Changes to allergies? No   Changes to medications? No   New conditions since last clinic visit? No   Unplanned office visit, urgent care, ED, or hospital admission in the last 4 weeks? No   How does patient/caregiver feel medication is working? Good   Financial problems or insurance changes? No   How many doses of your specialty medications were missed in the last 4 weeks? 0   Would patient like to speak to a pharmacist? No   When does the patient need to receive the medication? 08/06/23   Refill Delivery Questions    How will the patient receive the medication? [facility]   When does the patient need to receive the medication? 08/06/23   Shipping Address Home   Address in [facility] confirmed and updated if neccessary? Yes   Expected Copay ($) 40   Payment Method  CC on file   Days supply of Refill 28   Supplies needed? No supplies needed   Refill activity completed? Yes   Refill activity plan Refill scheduled   Shipment/Pickup Date: 08/03/23            Current Outpatient Medications   Medication Sig    albuterol (PROVENTIL) 2.5 mg /3 mL (0.083 %) nebulizer solution Take 3 mLs (2.5 mg total) by nebulization every 6 (six) hours as needed for Wheezing. Rescue    albuterol (PROVENTIL/VENTOLIN HFA) 90 mcg/actuation inhaler Inhale 2 puffs into the lungs every 4 (four) hours as needed for Wheezing or Shortness of Breath. Rescue    azelastine (ASTELIN) 137 mcg (0.1 %) nasal spray Use 1 spray (137 mcg total) in each nostril 2 (two)  times daily.    buPROPion (WELLBUTRIN XL) 300 MG 24 hr tablet 1 tablet Orally Once a day 90 days    clobetasoL (TEMOVATE) 0.05 % external solution Apply topically 2 (two) times daily. Apply to the scalp    clonazePAM (KLONOPIN) 0.5 MG tablet Take 0.5 mg by mouth daily as needed.    DUPIXENT  mg/2 mL PnIj Inject into the skin.    EPICERAM Ramon Apply topically 2 (two) times daily.    etanercept (ENBREL SURECLICK) 50 mg/mL (1 mL) Inject 1 mL (50 mg total) into the skin every 7 days.    fluticasone propionate (FLONASE) 50 mcg/actuation nasal spray SHAKE LIQUID AND USE 2 SPRAYS(100 MCG) IN EACH NOSTRIL EVERY DAY FOR 14 DAYS    fluticasone-salmeterol diskus inhaler 250-50 mcg Inhale 1 puff into the lungs 2 (two) times daily. Controller    fremanezumab-vfrm (AJOVY AUTOINJECTOR) 225 mg/1.5 mL autoinjector Inject 1.5 mL (225 mg total) into the skin every 30 days.    montelukast (SINGULAIR) 10 mg tablet TAKE 1 TABLET(10 MG) BY MOUTH EVERY EVENING    nebulizer accessories Kit 1 kit by Misc.(Non-Drug; Combo Route) route daily as needed.    propranoloL (INDERAL LA) 80 MG 24 hr capsule TAKE 1 CAPSULE(80 MG) BY MOUTH EVERY NIGHT.    semaglutide, weight loss, (WEGOVY) 0.25 mg/0.5 mL PnIj Inject 0.25 mg into the skin every 7 days.    tacrolimus (PROTOPIC) 0.1 % ointment Apply 1 application topically 2 (two) times daily.    topiramate (TOPAMAX) 100 MG tablet Take 1 tablet by mouth every night two hours before bed    ubrogepant (UBRELVY) 100 mg tablet Take 1 tablet by mouth at onset of headache. Take second tablet 2 hours later, if needed. No more than 2 tablets/day OR 3 days use per week.    venlafaxine (EFFEXOR-XR) 150 MG Cp24 Take 150 mg by mouth once daily.    VIOS AEROSOL DELIVERY SYSTEM Larissa USE DAILY AS NEEDED   Last reviewed on 7/11/2023 11:38 AM by Casey Baxter PA-C    Review of patient's allergies indicates:   Allergen Reactions    Pertussis vaccines     Decongestant allergy Palpitations    Last reviewed on   7/11/2023 11:38 AM by Casey Baxter      Tasks added this encounter   No tasks added.   Tasks due within next 3 months   8/2/2023 - Refill Coordination Outreach (1 time occurrence)     [facility]  [address]  [facility] [address]  Phone: [phone]  Fax: [phone]

## 2023-08-17 ENCOUNTER — OFFICE VISIT (OUTPATIENT)
Dept: ALLERGY | Facility: CLINIC | Age: 43
End: 2023-08-17
Payer: COMMERCIAL

## 2023-08-17 VITALS — WEIGHT: 165.69 LBS | OXYGEN SATURATION: 100 % | HEART RATE: 81 BPM | BODY MASS INDEX: 26.01 KG/M2 | HEIGHT: 67 IN

## 2023-08-17 DIAGNOSIS — J45.20 MILD INTERMITTENT ASTHMA WITHOUT COMPLICATION: ICD-10-CM

## 2023-08-17 DIAGNOSIS — J31.0 NON-ALLERGIC RHINITIS: Primary | ICD-10-CM

## 2023-08-17 DIAGNOSIS — L20.89 FLEXURAL ATOPIC DERMATITIS: ICD-10-CM

## 2023-08-17 PROCEDURE — 95004 PERQ TESTS W/ALRGNC XTRCS: CPT | Mod: S$GLB,,, | Performed by: STUDENT IN AN ORGANIZED HEALTH CARE EDUCATION/TRAINING PROGRAM

## 2023-08-17 PROCEDURE — 95004 PR ALLERGY SKIN TESTS,ALLERGENS: ICD-10-PCS | Mod: S$GLB,,, | Performed by: STUDENT IN AN ORGANIZED HEALTH CARE EDUCATION/TRAINING PROGRAM

## 2023-08-17 PROCEDURE — 3008F PR BODY MASS INDEX (BMI) DOCUMENTED: ICD-10-PCS | Mod: CPTII,S$GLB,, | Performed by: STUDENT IN AN ORGANIZED HEALTH CARE EDUCATION/TRAINING PROGRAM

## 2023-08-17 PROCEDURE — 99999 PR PBB SHADOW E&M-EST. PATIENT-LVL V: CPT | Mod: PBBFAC,,, | Performed by: STUDENT IN AN ORGANIZED HEALTH CARE EDUCATION/TRAINING PROGRAM

## 2023-08-17 PROCEDURE — 99205 PR OFFICE/OUTPT VISIT, NEW, LEVL V, 60-74 MIN: ICD-10-PCS | Mod: 25,S$GLB,, | Performed by: STUDENT IN AN ORGANIZED HEALTH CARE EDUCATION/TRAINING PROGRAM

## 2023-08-17 PROCEDURE — 99999 PR PBB SHADOW E&M-EST. PATIENT-LVL V: ICD-10-PCS | Mod: PBBFAC,,, | Performed by: STUDENT IN AN ORGANIZED HEALTH CARE EDUCATION/TRAINING PROGRAM

## 2023-08-17 PROCEDURE — 3008F BODY MASS INDEX DOCD: CPT | Mod: CPTII,S$GLB,, | Performed by: STUDENT IN AN ORGANIZED HEALTH CARE EDUCATION/TRAINING PROGRAM

## 2023-08-17 PROCEDURE — 1159F PR MEDICATION LIST DOCUMENTED IN MEDICAL RECORD: ICD-10-PCS | Mod: CPTII,S$GLB,, | Performed by: STUDENT IN AN ORGANIZED HEALTH CARE EDUCATION/TRAINING PROGRAM

## 2023-08-17 PROCEDURE — 1159F MED LIST DOCD IN RCRD: CPT | Mod: CPTII,S$GLB,, | Performed by: STUDENT IN AN ORGANIZED HEALTH CARE EDUCATION/TRAINING PROGRAM

## 2023-08-17 PROCEDURE — 99205 OFFICE O/P NEW HI 60 MIN: CPT | Mod: 25,S$GLB,, | Performed by: STUDENT IN AN ORGANIZED HEALTH CARE EDUCATION/TRAINING PROGRAM

## 2023-08-17 NOTE — PROGRESS NOTES
"ALLERGY & IMMUNOLOGY CLINIC -  NEW PATIENT     HISTORY OF PRESENT ILLNESS     Patient ID: Pura Gudino is a 43 y.o. female    CC:   Chief Complaint   Patient presents with    Allergy Testing     New patient allergy testing        HPI: Pura Gudino is a 43 y.o. female presents for evaluation of:    08/17/2023  States approximately 3 months ago, started to experience several episodes of ear pain and frequent "ear popping." Evaluated by ENT per history and diagnosed with TMJ and possible allergic rhinitis. States she frequently gets fluid in her ears. Has experienced nasal congestion, sneezing and runny nose. Denies seasonal worsening, known triggers including scented products and cleaning products. Tobacco smoke aggravates symptoms.     Asthma: had asthma as a child and required hospitalization on several occasions. Recalls being diagnosis with tracheo-bronchitis. Last used a maintenance inhaler 1 month ago. Uses Advair 250-50mcg 1 Puff BID as needed. Uses during viral illnesses for one week, discontinued upon improvement. Denies coughing, nocturnal awakenings, shortness of breath and wheezing episodes.     Atopic Dermatitis: Well controlled with Dupixent for previous year    Denies food allergy, insect allergy.      REVIEW OF SYSTEMS     CONST: no F/C/NS, no unintentional weight changes  Balance of review of systems negative except as mentioned above     MEDICAL HISTORY     MedHx: active problems reviewed  SurgHx:   Past Surgical History:   Procedure Laterality Date    COLONOSCOPY N/A 12/21/2022    Procedure: COLONOSCOPY;  Surgeon: Mikala Parker MD;  Location: [facility];  Service: Endoscopy;  Laterality: N/A;    HYSTERECTOMY      LASER LAPAROSCOPY      left hip surgery had hardware replaced then removed      SINUS SURGERY         SocHx:   -Denies smoking history and illicit drug use   -Alcohol Use: Denies  -Pets: 2 dogs  -Mold/Water Damage: Denies  -School/Work: Works as school " "psychologist    FamHx:   -Asthma: Aunt  -Atopic Dermatitis: denies  -Rhinitis: Aunt and Uncle  -Food Allergy: denies    Otherwise no Family History of asthma, allergic rhinitis, atopic dermatitis, drug allergy, food allergy, venom allergy or immune deficiency.     Allergies: see below  Medications: MAR reviewed       PHYSICAL EXAM     VS: Pulse 81   Ht 5' 7" (1.702 m)   Wt 75.1 kg (165 lb 10.8 oz)   LMP 05/23/2018   SpO2 100%   BMI 25.95 kg/m²   GENERAL: awake, alert, cooperative with exam  EYES: PERRL, EOMI, no conjunctival injection, no discharge, no infraorbital shiners  EARS: external auditory canals normal B/L, TM normal B/L  NOSE: NT 2+ and pink B/L, no stringing mucous, no polyps  EXTREMITIES: +2 distal pulses, no c/c/e  DERM: no rashes, no skin breaks     LABORATORY STUDIES     Negative Region 6 Panel     ALLERGEN TESTING     Skin Prick:   Verbal informed consent obtained after reviewing the risks, benefits and details of the procedure.    Inhalant Skin Testing Results:    Indoor Allergens    1. Cat: Negative  2. Cockroach: Negative  3. Dog Epithelium: Negative  4. Dust Mite (DF):  Negative  5. Dust Mite (DP): Negative     Trees  6. Mik, white:  Negative   7. Birch, mixed: Negative  8. Box Elder: Negative   9. Cedar, Red: Negative  10. [place], Eastern: Negative  11. [place], Bald:  Negative   12. Elm, American:  Negative  13. Hackberry:  Negative  14. Maple, Red:  Negative  15. [place], Red:  Negative  16. Oak, Mixed:  Negative   17. Pecan: Negative  18. Pine White:  Negative   19. Sweetgum:  Negative  20. Amory, American:  Negative  21. [place], Black:  Negative   22. [place], Black:  Negative    Weed Pollen  23. Lambs Quarters: Negative  24. [place] Elder, Rough: Negative  25. Mugwort: Negative  26. Pigweed, Rough: Negative  27. Plantain, English: Negative  28. Ragweed, Mixed: Negative  29. Russian Thistle: Negative  30. Kalli/Dock, Mixed: Negative    Grass Pollen  31. Bahia: " Negative  32. B[place]uda: Negative  33. Khris: Negative  34. Tyrese: Negative    Mold Spores  35. Acremonium: Negative  36. Alternaria alternata: Negative  37. Aspergillus fumigatus: Negative  38.  AureoBasidum Pullularis  39. Bipolaris Sor.  40. Botrytis cinerea: Negative   41. Candida albicans: Negative  42. Chaetomium globosum: Negative  43. Cladosporium cladosporioides: Negative  44. Drechslera specifera: Negative  45. Epicoccum nigrum: Negative  46. Fusarium spp: Negative  47. Gliocladium: Negative  48. Helminthosporium: Negative  49. Mucor spp: Negative  50. Neurospora spp: Negative  51. Penicillum spp: Negative  52. Phoma herbarum: Negative  53. Rhizopus spp: Negative  54. Rhodotorula rubra: Negative  55. Smuts, Mixed: Negative  56. Stemphtllium: Negative  57. Trichoderma: Negative  58. Thichophyton mentagrophytes: Negative    Controls  59. Saline: Negative  60. Histamine: 3+    Rating   Prick  Negative   No reaction  1+     Erythema only   2+   Erythema, w/wheal < 3mm  3+     Erythema w/wheal >3mm  4+   Erythema, wheal w/pseudopods      Interpretation: Negative to all tested allergens       ASSESSMENT/PLAN     Pura Gudino is a 43 y.o. female with     1. Non-allergic rhinitis  -Negative Immunocap and skin prick testing performed today with adequate controls. Recommend continuation of intranasal steroid and consider addition of azelastine nasal spray for refractory symptoms  - Ambulatory referral/consult to Allergy    2. Mild intermittent asthma without complication  Well controlled. Consider spirometry at future visits    3. Flexural atopic dermatitis  Well controlled with Dupixent.          Follow up: 6-12 months; as needed      Jose Elias Stevenson MD    I spent a total of 60 minutes on the day of the visit. This includes face to face time and non-face to face time preparing to see the patient (eg, review of tests), obtaining and/or reviewing separately obtained history, documenting clinical  information in the electronic or other health record, independently interpreting results and communicating results to the patient/family/caregiver, or care coordinator.

## 2023-08-19 ENCOUNTER — HOSPITAL ENCOUNTER (OUTPATIENT)
Dept: RADIOLOGY | Facility: HOSPITAL | Age: 43
Discharge: HOME | End: 2023-08-19
Attending: OBSTETRICS & GYNECOLOGY
Payer: COMMERCIAL

## 2023-08-19 DIAGNOSIS — Z12.31 VISIT FOR SCREENING MAMMOGRAM: ICD-10-CM

## 2023-08-19 PROCEDURE — 77063 MAMMO DIGITAL SCREENING BILAT WITH TOMO: ICD-10-PCS | Mod: 26,,, | Performed by: RADIOLOGY

## 2023-08-19 PROCEDURE — 77067 SCR MAMMO BI INCL CAD: CPT | Mod: 26,,, | Performed by: RADIOLOGY

## 2023-08-19 PROCEDURE — 77067 MAMMO DIGITAL SCREENING BILAT WITH TOMO: ICD-10-PCS | Mod: 26,,, | Performed by: RADIOLOGY

## 2023-08-19 PROCEDURE — 77063 BREAST TOMOSYNTHESIS BI: CPT | Mod: 26,,, | Performed by: RADIOLOGY

## 2023-08-19 PROCEDURE — 77067 SCR MAMMO BI INCL CAD: CPT | Mod: TC,PO

## 2023-09-01 ENCOUNTER — OFFICE VISIT (OUTPATIENT)
Dept: NEUROLOGY | Facility: CLINIC | Age: 43
End: 2023-09-01
Payer: COMMERCIAL

## 2023-09-01 VITALS
RESPIRATION RATE: 17 BRPM | HEIGHT: 67 IN | BODY MASS INDEX: 25.53 KG/M2 | WEIGHT: 162.69 LBS | HEART RATE: 90 BPM | SYSTOLIC BLOOD PRESSURE: 116 MMHG | DIASTOLIC BLOOD PRESSURE: 80 MMHG

## 2023-09-01 DIAGNOSIS — R68.84 JAW PAIN: ICD-10-CM

## 2023-09-01 DIAGNOSIS — G43.909 EPISODIC MIGRAINE: Primary | ICD-10-CM

## 2023-09-01 PROCEDURE — 1160F PR REVIEW ALL MEDS BY PRESCRIBER/CLIN PHARMACIST DOCUMENTED: ICD-10-PCS | Mod: CPTII,S$GLB,, | Performed by: PHYSICIAN ASSISTANT

## 2023-09-01 PROCEDURE — 3079F DIAST BP 80-89 MM HG: CPT | Mod: CPTII,S$GLB,, | Performed by: PHYSICIAN ASSISTANT

## 2023-09-01 PROCEDURE — 1159F MED LIST DOCD IN RCRD: CPT | Mod: CPTII,S$GLB,, | Performed by: PHYSICIAN ASSISTANT

## 2023-09-01 PROCEDURE — 99999 PR PBB SHADOW E&M-EST. PATIENT-LVL V: CPT | Mod: PBBFAC,,, | Performed by: PHYSICIAN ASSISTANT

## 2023-09-01 PROCEDURE — 3079F PR MOST RECENT DIASTOLIC BLOOD PRESSURE 80-89 MM HG: ICD-10-PCS | Mod: CPTII,S$GLB,, | Performed by: PHYSICIAN ASSISTANT

## 2023-09-01 PROCEDURE — 99214 PR OFFICE/OUTPT VISIT, EST, LEVL IV, 30-39 MIN: ICD-10-PCS | Mod: S$GLB,,, | Performed by: PHYSICIAN ASSISTANT

## 2023-09-01 PROCEDURE — 3074F SYST BP LT 130 MM HG: CPT | Mod: CPTII,S$GLB,, | Performed by: PHYSICIAN ASSISTANT

## 2023-09-01 PROCEDURE — 99999 PR PBB SHADOW E&M-EST. PATIENT-LVL V: ICD-10-PCS | Mod: PBBFAC,,, | Performed by: PHYSICIAN ASSISTANT

## 2023-09-01 PROCEDURE — 3008F BODY MASS INDEX DOCD: CPT | Mod: CPTII,S$GLB,, | Performed by: PHYSICIAN ASSISTANT

## 2023-09-01 PROCEDURE — 1159F PR MEDICATION LIST DOCUMENTED IN MEDICAL RECORD: ICD-10-PCS | Mod: CPTII,S$GLB,, | Performed by: PHYSICIAN ASSISTANT

## 2023-09-01 PROCEDURE — 3008F PR BODY MASS INDEX (BMI) DOCUMENTED: ICD-10-PCS | Mod: CPTII,S$GLB,, | Performed by: PHYSICIAN ASSISTANT

## 2023-09-01 PROCEDURE — 3074F PR MOST RECENT SYSTOLIC BLOOD PRESSURE < 130 MM HG: ICD-10-PCS | Mod: CPTII,S$GLB,, | Performed by: PHYSICIAN ASSISTANT

## 2023-09-01 PROCEDURE — 99214 OFFICE O/P EST MOD 30 MIN: CPT | Mod: S$GLB,,, | Performed by: PHYSICIAN ASSISTANT

## 2023-09-01 PROCEDURE — 1160F RVW MEDS BY RX/DR IN RCRD: CPT | Mod: CPTII,S$GLB,, | Performed by: PHYSICIAN ASSISTANT

## 2023-09-01 RX ORDER — CHLORZOXAZONE 500 MG/1
500 TABLET ORAL 3 TIMES DAILY PRN
Qty: 90 TABLET | Refills: 6 | Status: SHIPPED | OUTPATIENT
Start: 2023-09-01 | End: 2023-10-05

## 2023-09-01 NOTE — PROGRESS NOTES
[facility]  [address] [facility] [address]  [place] [address]  Phone:[phone]  Fax: [phone]   Follow up visit        Patient Name: Pura Gudino  : 1980  MRN:  [id number]  Today: 2023  LOV:  2023  chief complaint: Headache    PCP: Berenice Garrido MD.    Assessment:   Pura Gudino is a 43 y.o. right female  with a PMHx of: with fibromyalgia,  IBS -C, PCOS, RA, childhood seizures, GERD, anxiety , depression, asthma and migraines     whom presents with her  in follow up for HA.  KAPLAN appear to be chronic migrainous, however, much better since starting ajovy. Some jaw/face pain, appearing more TMJ as opposed to trigeminal. Having fluid in her left ear (?) and fluid behind her left ear (under the skin-?). She is followed by ENT. I offered imaging studies of the brain (which would show sinuses) to ensure no change (which would guide treatment), she declined.       ICD-10-CM ICD-9-CM   1. Episodic migraine  G43.909 346.90   2. Jaw pain  R68.84 784.92             Plan:   For HA Prevention:  1 continue ajovy   2 return to topamax 100 mg QHS (was at 75 mg)  3 mood medicines per PCP   4 stop flexeril, try parafon forte can use 1 pill TID headaches/jaw pain, discussed adv effects/can cut in half it if makes her tired, and no use with etoh. She agreed     For HA :   ubrelvy   Limit OTC to <3 days use in week     To break up Headaches:  N/a       Other orders:  N/a       All test results as well as any necessary instructions were reviewed and discussed with patient.    Review:         No orders of the defined types were placed in this encounter.    No orders of the defined types were placed in this encounter.        Patient to return to PCP/other specialists for all other problems  Patient to continue on all medications as Rx'd  Full office note available online   RTO- she will self schedule and return in 3 months   The patient  "indicates understanding of these issues and agrees to the plan.    HPI:   Pura Gudino is a 43 y.o.right handed, female with a PMHx of: with fibromyalgia,  IBS -C, PCOS, RA, childhood seizures, GERD, anxiety , depression, asthma and migraines     whom presents with her  in follow up for HA.     At last visit: continue ajovy, topamax, and ubrelvy.   Pain in the LEFT orbital/cheek region         -been to dentist/ENT-diagnosed with TMJ        -pain mostly in LEFT ramus of mandible, hard foods make it worse, stress causes her to clench          -historically did well with , was destroyed by dog, just got a new one and started wearing for a few weeks          -has tried zanaflex or flexeril  (both make her tired)                 -when asked about TN symptoms, zayda pain below the LEFT eye near the nose and radiation to facie on the LEFT (high cheek). Gentle touch, temperature, chewing doesn't make it worse.   When she has bad HA ubrelvy helps                           On topamax at 100 mg, memory better  Migraines are doing better overall (1 migraine in past month)    States no side effects from ajovy or ubrelvy  Mentions she has had some sinus and ear changes ("feels like fluid in the left ear, dripping into throat and in has fluid under skin in behind the left ear"). Has seen ENT and allergy (notes reviewed). Discomfort is NOT in concert with KAPLAN.   No other neurologic concerns.       From previous visit: continued ajovy, topamax taper and has ubrelvy.   Pain in LEFT cheek and under left eye   Ubrelvy was abortive  75 mg of topamax at present (was coming down d/t 200 mg BID causing cognitive changes, feels cognition is better, but more HA now)   Did have sinus infection last week contributing to HA  10 HD a month now, some with facial pain others migrainous (4-6 of the aforementioned migrainous).   Would like to go back up a little on topamax  Lost 2 days of work d/t TMJ  No HA at " "present  No side effects from ajovy            From previous visits: with Dr. Augustin (9/15/2022): continue ajovy, topamax 100 mg BID, inderal and effexor to reduce HA, ubrelvy to abort HA.   3-4 HD in past month  Pressure in cheeks and around eyes  Aleve made it go away   Had HA this morning  No side effects from ajovy  Ubrelvy for the one migraine she had this month   Feels brain fog is worse, would like to come off topamax   History of trauma (no), History of CNS infection (no), History of Stroke (no)  Severity: range: 1-7/10  Associated factors (bolded positive) WITH HA ( or migraine): Nausea, vomiting, photophobia, phonophobia, tinnitus, scalp pain, vision loss, diplopia, scintillations, eye pain, jaw pain, weakness?    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       From Dr. Augustin's notes (9/15/2022):  "  42Y RH F with fibromyalgia,  IBS -C, PCOS, RA, childhood seizures, GERD, anxiety , depression, asthma who presents for further evaluation of headache. She was initially seen on 6/22.   She mentions that she had headache x 3 more weeks after " "her initial visit. First month of Ajovy 225 mg she did not notice any improvement in headache. Second month some improvement, but after 3rd months she has really noticed a significant improvement. She has not had any side effects from Ajovy. She mentions that she has had 1 migraine attack in the last 4 weeks. She has had 3 mild headache in the last month that  tylenol resolved them and only 1 time did she need 1 migraine that needed eletriptan 40 mg which helped minimally and the attack continued. She has found that Ubrelvy 50 mg works much better than eletriptan 40 mg and only 1 dose of Ubrelvy 50 mg resolves attack and eletriptan 40 mg will need anther mediation like tylenol and the attacks lasts much longer.  She had a basal cell carcinoma on her left temple resected which caused a lot of pain and triggered the severe attacks for longer. This has resolved now. She is holding off TPX weaning off as she is concerned that she might worsen as she has been on TPX for so long.      Headache history initial visit on 6/10/22:  Age at onset and course over time: began having headache around 2009 typically they were everyday, she was very motion sensitive, car rides made her dizzy/nauseated in 2010 started on  mg bid and she had maybe 1 time every 6 months a migraine level headache. She would turn off all the lights, heavy pillow on head, pressure on face, would take clonazepam to get calm, they were lasting a few hours and would resolve. She was having "sinus headaches" which were 1 time every 3 months. They are mainly pressure in the cheek bones, mild pain, was able to be functional and no migraine symoptms resolved quickly.      She had COVID 19 in 1/22 had body aches, fever, sore throat, coughing, fatigue, headache which lasted for about 1 week, then she had the flu type A about a month after that. She reports that since then she has been having multiple sinus infections, lots of drainage, she has had some sinus " headaches and will take Aleve 2-3 times a week for those sinus headache.  The sinus headaches she is having now are mainly frontal maxillary, they will stay mild, post nasal drip, if does not treat with Aleve would intensify and worsen her activities and feels like eyebrows are being pushed. These last for about a few hours the aleve improves it, but will come back in the evening. They don't resolve all the day, but better.      Migraine level pain: every 1-2 weeks or so , starts with the feeling of heat in the left eye crescent shape around the left eye , starts faint and gets stronger that can last up to 2 days or so. She will also have the light/sound/smell sensitivity, nausea, the pain described as pulsing heat/sharp pain. She will then take the rizatriptan 10mg which   These attacks last about 1-2 days or so. Denies any side effects. Typically rizatriptan 10mg about 3-4 hours. She never does a second dose. Since the worsening she has not had any head imaging. She has been under more stress and having new job a school psychologist. She mentions that she is fostering and is 4 years old and visiting more with bio family and been stresses     She mentions that she would only had triggers to the side of the face like waxing/injury to that side. She mentions that when they first started did not start with heat crescent first and then from there.     Location: frontal, around left eye and then temporal and will become variable location  Character: tight band, pressure, heat, sharp, pulsing   Intensity:  1-9/10 , current 1/10   Frequency: 3 times a week milder attack, 1-2 days a week severe   Duration: milder attacks 1 day, migraine 1-2 days   Aura:  Denies   Associated symptoms: photophobia, phonophobia, osmophobia, kinesiophobia, neck tightness/pain, nausea/vomiting  Other neurologic symptoms: nasal congestion, neck tightness/pain, difficulty concentration, problems with task completion, dizziness, OS blurry  "vision  Precipitating factors:  Stress, physical trigger to the Left V1 distribution like injury or waxing of eye brow   Alleviating factors:  darkness,  local pressure, medications  Aggravating factors: stress  ER/UC visits: 0   Caffeine:  2 cups coffee/work days   Sleep: reports good   GYN: s/p hysterectomy "    Medication Reconciliation:   Current Outpatient Medications   Medication Sig Dispense Refill    albuterol (PROVENTIL) 2.5 mg /3 mL (0.083 %) nebulizer solution Take 3 mLs (2.5 mg total) by nebulization every 6 (six) hours as needed for Wheezing. Rescue (Patient not taking: Reported on 8/17/2023) 3 mL 4    albuterol (PROVENTIL/VENTOLIN HFA) 90 mcg/actuation inhaler Inhale 2 puffs into the lungs every 4 (four) hours as needed for Wheezing or Shortness of Breath. Rescue (Patient not taking: Reported on 8/17/2023) 8 g 2    azelastine (ASTELIN) 137 mcg (0.1 %) nasal spray Use 1 spray (137 mcg total) in each nostril 2 (two) times daily. (Patient not taking: Reported on 8/17/2023) 30 mL 0    buPROPion (WELLBUTRIN XL) 300 MG 24 hr tablet Take 1 tablet by mouth daily (Patient not taking: Reported on 8/17/2023) 30 tablet 0    clobetasoL (TEMOVATE) 0.05 % external solution Apply topically 2 (two) times daily. Apply to the scalp (Patient not taking: Reported on 8/17/2023) 50 mL 1    clonazePAM (KLONOPIN) 0.5 MG tablet Take 0.5 mg by mouth daily as needed.      clonazePAM (KLONOPIN) 0.5 MG tablet Take 1 ½ tablets by mouth daily as needed for anxiety (Patient not taking: Reported on 8/17/2023) 45 tablet 0    cyclobenzaprine (FLEXERIL) 5 MG tablet Take 1 tablet by mouth every evening as needed for muscle spasms. Caution: May cause drowsiness. (Patient not taking: Reported on 8/17/2023) 15 tablet 0    DUPIXENT  mg/2 mL PnIj Inject into the skin.      EPICERAM Ramon Apply topically 2 (two) times daily.      etanercept (ENBREL SURECLICK) 50 mg/mL (1 mL) Inject 1 mL (50 mg total) into the skin every 7 days. (Patient " not taking: Reported on 8/17/2023) 12 mL 1    fluticasone propionate (FLONASE) 50 mcg/actuation nasal spray SHAKE LIQUID AND USE 2 SPRAYS(100 MCG) IN EACH NOSTRIL EVERY DAY FOR 14 DAYS (Patient not taking: Reported on 8/17/2023) 48 g 0    fluticasone-salmeterol diskus inhaler 250-50 mcg Inhale 1 puff into the lungs 2 (two) times daily. Controller 60 each 11    fremanezumab-vfrm (AJOVY AUTOINJECTOR) 225 mg/1.5 mL autoinjector Inject 1.5 mL (225 mg total) into the skin every 30 days. (Patient not taking: Reported on 8/17/2023) 1.5 mL 11    montelukast (SINGULAIR) 10 mg tablet TAKE 1 TABLET(10 MG) BY MOUTH EVERY EVENING (Patient not taking: Reported on 8/17/2023) 30 tablet 5    nebulizer accessories Kit 1 kit by Misc.(Non-Drug; Combo Route) route daily as needed. (Patient not taking: Reported on 8/17/2023) 1 kit 0    propranoloL (INDERAL LA) 80 MG 24 hr capsule TAKE 1 CAPSULE(80 MG) BY MOUTH EVERY NIGHT. (Patient not taking: Reported on 8/17/2023) 90 capsule 3    semaglutide, weight loss, (WEGOVY) 0.25 mg/0.5 mL PnIj Inject 0.25 mg into the skin every 7 days. (Patient not taking: Reported on 8/17/2023) 0.5 mL 4    tacrolimus (PROTOPIC) 0.1 % ointment Apply 1 application topically 2 (two) times daily.      topiramate (TOPAMAX) 100 MG tablet Take 1 tablet by mouth every night two hours before bed (Patient not taking: Reported on 8/17/2023) 90 tablet 1    traZODone (DESYREL) 50 MG tablet Take 1 tablet by mouth nightly at bedtime as needed (Patient not taking: Reported on 8/17/2023) 30 tablet 0    ubrogepant (UBRELVY) 100 mg tablet Take 1 tablet by mouth at onset of headache. Take second tablet 2 hours later, if needed. No more than 2 tablets/day OR 3 days use per week. (Patient not taking: Reported on 8/17/2023) 12 tablet 6    venlafaxine (EFFEXOR XR) 75 MG 24 hr capsule Take 1 capsule by mouth daily with food (Patient not taking: Reported on 8/17/2023) 30 capsule 0    venlafaxine (EFFEXOR-XR) 150 MG Cp24 Take 150 mg by  mouth once daily.      venlafaxine (EFFEXOR-XR) 150 MG Cp24 Take 1 capsule by mouth daily (Patient not taking: Reported on 8/17/2023) 30 capsule 0    VIOS AEROSOL DELIVERY SYSTEM Larissa USE DAILY AS NEEDED       No current facility-administered medications for this visit.     Review of patient's allergies indicates:   Allergen Reactions    Pertussis vaccines     Decongestant allergy Palpitations       PMHx:  Past Medical History:   Diagnosis Date    Allergy     Anemia     Asthma     Chronic diarrhea     Elevated C-reactive protein (CRP)     Fibromyalgia     GERD (gastroesophageal reflux disease)     Headache(784.0)     Irritable bowel syndrome     Mental disorder     Bipolar Disorder, Anxiety, Depression    Motion sickness     Motion sickness     PCOS (polycystic ovarian syndrome)     Rheumatoid arthritis     Seizures     childhood     Past Surgical History:   Procedure Laterality Date    COLONOSCOPY N/A 12/21/2022    Procedure: COLONOSCOPY;  Surgeon: Mikala Parker MD;  Location: [facility];  Service: Endoscopy;  Laterality: N/A;    HYSTERECTOMY      LASER LAPAROSCOPY      left hip surgery had hardware replaced then removed      SINUS SURGERY         Fhx:  Family History   Problem Relation Age of Onset    Rheum arthritis Maternal Grandmother     Bipolar disorder Other     Cataracts Mother     Cataracts Maternal Grandfather     Asthma Maternal Aunt     Alcohol abuse Maternal Aunt     Depression Maternal Aunt     Diabetes Mellitus Maternal Aunt     Heart disease Maternal Aunt     Alcohol abuse Maternal Uncle     Bipolar disorder Maternal Uncle     Heart failure Maternal Uncle     Breast cancer Neg Hx     Ovarian cancer Neg Hx     Migraines Neg Hx        Shx:   Social History     Socioeconomic History    Marital status:     Number of children: 0    Years of education: 19   Occupational History    Occupation: psychologist   Tobacco Use    Smoking status: Never    Smokeless tobacco: Never   Substance and Sexual  Activity    Alcohol use: No    Drug use: No    Sexual activity: Yes     Birth control/protection: OCP     Social Determinants of Health     Financial Resource Strain: Unknown (2023)    Overall Financial Resource Strain (CARDIA)     Difficulty of Paying Living Expenses: Patient refused   Food Insecurity: Unknown (2023)    Hunger Vital Sign     Worried About Running Out of Food in the Last Year: Patient refused     Ran Out of Food in the Last Year: Patient refused   Transportation Needs: Unknown (2023)    PRAPARE - Transportation     Lack of Transportation (Medical): Patient refused     Lack of Transportation (Non-Medical): Patient refused   Physical Activity: Unknown (2023)    Exercise Vital Sign     Days of Exercise per Week: Patient refused   Stress: Unknown (2023)    [employer] [facility] [employer] - Occupational Stress Questionnaire     Feeling of Stress : Patient refused   Social Connections: Unknown (2023)    Social Connection and Isolation Panel [NHANES]     Frequency of Communication with Friends and Family: Patient refused     Frequency of Social Gatherings with Friends and Family: Patient refused     Active Member of Clubs or Organizations: Patient refused     Attends Club or Organization Meetings: Patient refused     Marital Status: Patient refused   Housing Stability: Unknown (2023)    Housing Stability Vital Sign     Unable to Pay for Housing in the Last Year: Patient refused     Number of Places Lived in the Last Year: 1     Unstable Housing in the Last Year: Patient refused           Labs:   Reviewed in chart     Imagin/3/2022 MRI brain (report): 1.  Normal imaging of the brain.  There is no acute abnormality.  There is no hemorrhage, mass/mass effect, acute infarction.  There is no pathologic enhancement.         Other testing:  Reviewed in chart     Note: I have independently reviewed any/all imaging/labs/tests and agree with the report (s) as documented.   "Any discrepancies will be as noted/demarcated by free text.  ADAN LEAL 9/1/2023                     ROS:   Review Of Systems (questions asked, positive or additions in BOLD)  Gen: Weight change, fatigue/malaise, pyrexia   HEENT: Tinnitus, headache,  blurred vision, eye pain, diplopia, photophobia,  nose bleeds, congestion, sore throat, jaw pain, scalp pain, neck stiffness   Card: Palpitations, CP   Pulm: SOB, cough   Vas: Easy bruising, easy bleeding   GI: N/V/D/C, incontinence, hematemesis, hematochezia    : incontinence, hematuria        M/S:   falls    Neuro: PER HPI   PSY: Memory loss, confusion, depression, anxiety, trouble in sleep          EXAM:   /80 (BP Location: Right arm, Patient Position: Sitting, BP Method: Medium (Automatic))   Pulse 90   Resp 17   Ht 5' 7" (1.702 m)   Wt 73.8 kg (162 lb 11.2 oz)   LMP 05/23/2018   BMI 25.48 kg/m²     GEN:  NAD  HEENT: NC/AT, Frontalis was NTTP,   temporalis was NTTP, open/close jaw with no crepitus,  nares patent, dentition appropriate, occipitalis NTTP, palpation of pinna NTTP bilat  EXTREM: no edema present.    NEURO:  Mental Status:  Awake, alert and appropriately oriented to time, place, and person.  Normal attention and concentration.  Speech is fluent and appropriate language function (I.e., comprehension). Avoids eye contact      Cranial Nerves:   Extraocular movements are intact and without nystagmus.  Visual fields are full to confrontation testing.   Facial movement is symmetric.  LEFT TN (infraorbital region) tender to deep palpation--along the LEFT lower periorbital region-rest of cheek apparently spared).t.  Hearing is normal. Uvula in midline. FROM of neck in all (6) directions, shoulder shrug symmetrical. Tongue in midline without fasiculation.                Motor:  RUE:appropriate against gravity and medium force as tested 5/5              LUE: appropriate against gravity and medium force as tested 5/5              RLE:appropriate against " gravity and medium force as tested 5/5              LLE: appropriate against gravity and medium force as tested 5/5  No resting tremor   No drift        Sensory:  RUE     intact light touch, proprioception, and temperature  LUE     intact light touch, proprioception, and temperature     RLE     intact light touch  LLE      intact light touch        DTR's:                                            R              L  biceps 2+ 2+              Knee jerk 2+ 2+         Coordination:  FTN-WNL.       Gait and Stance: Normal manner of stance and gait function testing.      This document has been electronically signed by Mr. Sb Fisher MPA, PA-C on 9/1/2023, I have personally typed this message using the EMR.       Dr Deep MD  was available during today's visit.   Personal Protective Equipment:    Personal Protective Equipment was used during this encounter including;    non latex gloves.       CC: Berenice Garrido MD

## 2023-09-01 NOTE — PROGRESS NOTES
"Subjective:      Patient ID: Pura Gudino is a 43 y.o. female.    Chief Complaint: Disease Management    HPI    Rheumatologic History:     - Diagnosis/es:   - fibromyalgia  - left rotator cuff tendinopathy  - seronegative (- ANT, RF, CCP, TTG, Endomysial Ab) rheumatoid arthritis initially diagnosed by Dr Regan in 2013  - Positive serologies: -  - Negative serologies:  ANT, RF, CCP, tTG  - Infectious screening labs:  Negative hepatitis-B, C, and QuantiFERON (04/2023)  - Imaging:   - Xray arthritis survey (4/2023) No obvious sequela from inflammatory arthropathy is identified  - Previous Treatments:   - RA Treatment History:   - HCQ  - MTX  - Humira: caused rashes    - Fibromyalgia Treatment History:  - Baclofen  - Flexeril  - Cymbalta  - Effexor  - Prozac   - Zoloft  - Lyrica  - Current Treatments:    - Enbrel  - Birth control: s/p hysterectomy  Interval History:   At the moment, she reports fatigue and TMJ dysfunction for which she is seeing an ENT. She was given a muscle relaxant but has yet to try it. She has not been exercising regularly. She denies joint pain and swelling.    Objective:   /67   Pulse 80   Ht 5' 7" (1.702 m)   Wt 73 kg (160 lb 15 oz)   LMP 05/23/2018   BMI 25.21 kg/m²   Physical Exam   Constitutional: normal appearance.   HENT:   Head: Normocephalic and atraumatic.   Cardiovascular: Normal rate, regular rhythm and normal heart sounds.   Pulmonary/Chest: Effort normal and breath sounds normal.   Musculoskeletal:      Comments: No synovitis, dactylitis, enthesitis, effusions    Neurological: She is alert.   Skin: Skin is warm and dry. No rash noted.   No skin thickening, telangiectasias, calcinosis, psoriasiform lesions, lupoid lesions        9/5/2023   Tender (OLIVO-28) 0 / 28    Swollen (OLIVO-28) 0 / 28    Provider Global 20 mm   Patient Global 60 mm   ESR 24 mm/hr   CRP 8.1 mg/L   OLIVO-28 (ESR) 3.06 (Low disease activity)   OLIVO-28 (CRP) 2.59 (Remission)   CDAI Score 8       Labs " independently reviewed by me (07/12/2023)  CBC WBC, ANC, HGB, platelets WNL  CMP WNL  ESR 24 (WNL)  CRP 8.1 (WNL)    Assessment:     1. Seronegative rheumatoid arthritis    2. Fibromyalgia    3. Drug-induced immunodeficiency    4. High risk medication use      This is a 43 year old woman with history of anxiety, endometriosis s/p hysterectomy, childhood seizures, depression, asthma, migraines, IBS, fibromyalgia, left rotator cuff tendinopathy, and seronegative (- ANT, RF, CCP, TTG, Endomysial Ab) rheumatoid arthritis initially diagnosed by Dr Regan in 2013 and on Enbrel (2020- ). I do not find active inflammatory arthritis on exam.  DAS28 CRP is consistent with remission and DAS28 ESR and CDAI are consistent with low disease activity.  We will continue current medications as is.  I do not think that her rheumatoid arthritis is flaring.  I advised her to try the muscle relaxant prescribed by her neurologist and recommended regular aerobic exercise and adequate sleep.    Plan:     Problem List Items Addressed This Visit          Orthopedic    Fibromyalgia    Relevant Orders    CBC Auto Differential    Comprehensive Metabolic Panel    Sedimentation rate    C-Reactive Protein     Other Visit Diagnoses       Seronegative rheumatoid arthritis    -  Primary    Relevant Orders    CBC Auto Differential    Comprehensive Metabolic Panel    Sedimentation rate    C-Reactive Protein    Drug-induced immunodeficiency        Relevant Orders    CBC Auto Differential    Comprehensive Metabolic Panel    Sedimentation rate    C-Reactive Protein    High risk medication use        Relevant Orders    CBC Auto Differential    Comprehensive Metabolic Panel    Sedimentation rate    C-Reactive Protein          - Enbrel  - CBC, CMP, ESR, CRP every 12 weeks  - Pre-DMARD labs due for repeat 4/2024  - Immunizations: COVID x 4 (most recent 6/2022), flu (10/2022), patient needs the pneumonia vaccines, and Shingrix  - Birth control: s/p  hysterectomy    Follow up in 4 months    30 minutes of total time spent on the encounter, which includes face to face time and non-face to face time preparing to see the patient (eg, review of tests), Obtaining and/or reviewing separately obtained history, Documenting clinical information in the electronic or other health record, Independently interpreting results (not separately reported) and communicating results to the patient/family/caregiver, or Care coordination (not separately reported).       Kortney Williamson M.D.  Rheumatology Dept  [place], [place]

## 2023-09-05 ENCOUNTER — OFFICE VISIT (OUTPATIENT)
Dept: RHEUMATOLOGY | Facility: CLINIC | Age: 43
End: 2023-09-05
Payer: COMMERCIAL

## 2023-09-05 VITALS
HEART RATE: 80 BPM | DIASTOLIC BLOOD PRESSURE: 67 MMHG | WEIGHT: 160.94 LBS | BODY MASS INDEX: 25.26 KG/M2 | HEIGHT: 67 IN | SYSTOLIC BLOOD PRESSURE: 100 MMHG

## 2023-09-05 DIAGNOSIS — D84.821 DRUG-INDUCED IMMUNODEFICIENCY: ICD-10-CM

## 2023-09-05 DIAGNOSIS — Z79.899 DRUG-INDUCED IMMUNODEFICIENCY: ICD-10-CM

## 2023-09-05 DIAGNOSIS — Z79.899 HIGH RISK MEDICATION USE: ICD-10-CM

## 2023-09-05 DIAGNOSIS — M06.00 SERONEGATIVE RHEUMATOID ARTHRITIS: Primary | ICD-10-CM

## 2023-09-05 DIAGNOSIS — M79.7 FIBROMYALGIA: ICD-10-CM

## 2023-09-05 PROCEDURE — 1160F RVW MEDS BY RX/DR IN RCRD: CPT | Mod: CPTII,S$GLB,, | Performed by: STUDENT IN AN ORGANIZED HEALTH CARE EDUCATION/TRAINING PROGRAM

## 2023-09-05 PROCEDURE — 3074F SYST BP LT 130 MM HG: CPT | Mod: CPTII,S$GLB,, | Performed by: STUDENT IN AN ORGANIZED HEALTH CARE EDUCATION/TRAINING PROGRAM

## 2023-09-05 PROCEDURE — 3008F PR BODY MASS INDEX (BMI) DOCUMENTED: ICD-10-PCS | Mod: CPTII,S$GLB,, | Performed by: STUDENT IN AN ORGANIZED HEALTH CARE EDUCATION/TRAINING PROGRAM

## 2023-09-05 PROCEDURE — 3078F DIAST BP <80 MM HG: CPT | Mod: CPTII,S$GLB,, | Performed by: STUDENT IN AN ORGANIZED HEALTH CARE EDUCATION/TRAINING PROGRAM

## 2023-09-05 PROCEDURE — 1160F PR REVIEW ALL MEDS BY PRESCRIBER/CLIN PHARMACIST DOCUMENTED: ICD-10-PCS | Mod: CPTII,S$GLB,, | Performed by: STUDENT IN AN ORGANIZED HEALTH CARE EDUCATION/TRAINING PROGRAM

## 2023-09-05 PROCEDURE — 1159F PR MEDICATION LIST DOCUMENTED IN MEDICAL RECORD: ICD-10-PCS | Mod: CPTII,S$GLB,, | Performed by: STUDENT IN AN ORGANIZED HEALTH CARE EDUCATION/TRAINING PROGRAM

## 2023-09-05 PROCEDURE — 99999 PR PBB SHADOW E&M-EST. PATIENT-LVL V: CPT | Mod: PBBFAC,,, | Performed by: STUDENT IN AN ORGANIZED HEALTH CARE EDUCATION/TRAINING PROGRAM

## 2023-09-05 PROCEDURE — 3008F BODY MASS INDEX DOCD: CPT | Mod: CPTII,S$GLB,, | Performed by: STUDENT IN AN ORGANIZED HEALTH CARE EDUCATION/TRAINING PROGRAM

## 2023-09-05 PROCEDURE — 3074F PR MOST RECENT SYSTOLIC BLOOD PRESSURE < 130 MM HG: ICD-10-PCS | Mod: CPTII,S$GLB,, | Performed by: STUDENT IN AN ORGANIZED HEALTH CARE EDUCATION/TRAINING PROGRAM

## 2023-09-05 PROCEDURE — 1159F MED LIST DOCD IN RCRD: CPT | Mod: CPTII,S$GLB,, | Performed by: STUDENT IN AN ORGANIZED HEALTH CARE EDUCATION/TRAINING PROGRAM

## 2023-09-05 PROCEDURE — 99215 PR OFFICE/OUTPT VISIT, EST, LEVL V, 40-54 MIN: ICD-10-PCS | Mod: S$GLB,,, | Performed by: STUDENT IN AN ORGANIZED HEALTH CARE EDUCATION/TRAINING PROGRAM

## 2023-09-05 PROCEDURE — 99215 OFFICE O/P EST HI 40 MIN: CPT | Mod: S$GLB,,, | Performed by: STUDENT IN AN ORGANIZED HEALTH CARE EDUCATION/TRAINING PROGRAM

## 2023-09-05 PROCEDURE — 3078F PR MOST RECENT DIASTOLIC BLOOD PRESSURE < 80 MM HG: ICD-10-PCS | Mod: CPTII,S$GLB,, | Performed by: STUDENT IN AN ORGANIZED HEALTH CARE EDUCATION/TRAINING PROGRAM

## 2023-09-05 PROCEDURE — 99999 PR PBB SHADOW E&M-EST. PATIENT-LVL V: ICD-10-PCS | Mod: PBBFAC,,, | Performed by: STUDENT IN AN ORGANIZED HEALTH CARE EDUCATION/TRAINING PROGRAM

## 2023-09-08 DIAGNOSIS — J45.40 MODERATE PERSISTENT ASTHMA WITHOUT COMPLICATION: ICD-10-CM

## 2023-09-08 RX ORDER — MONTELUKAST SODIUM 10 MG/1
TABLET ORAL
Qty: 90 TABLET | Refills: 1 | Status: SHIPPED | OUTPATIENT
Start: 2023-09-08

## 2023-09-08 NOTE — TELEPHONE ENCOUNTER
Refill Decision Note   Pura Gudino  is requesting a refill authorization.  Brief Assessment and Rationale for Refill:  Approve     Medication Therapy Plan:         Comments:     Note composed:6:48 AM 09/08/2023

## 2023-09-08 NOTE — TELEPHONE ENCOUNTER
No care due was identified.  [facility] Embedded Care Due Messages. Reference number: [id number].   9/08/2023 6:09:49 AM CDT

## 2023-09-11 ENCOUNTER — OFFICE VISIT (OUTPATIENT)
Dept: OPTOMETRY | Facility: CLINIC | Age: 43
End: 2023-09-11
Payer: COMMERCIAL

## 2023-09-11 DIAGNOSIS — H52.4 MYOPIA WITH ASTIGMATISM AND PRESBYOPIA, BILATERAL: ICD-10-CM

## 2023-09-11 DIAGNOSIS — H52.203 MYOPIA WITH ASTIGMATISM AND PRESBYOPIA, BILATERAL: ICD-10-CM

## 2023-09-11 DIAGNOSIS — H43.393 VITREOUS FLOATERS, BILATERAL: ICD-10-CM

## 2023-09-11 DIAGNOSIS — G51.8 NEURALGIC FACIAL PAIN: ICD-10-CM

## 2023-09-11 DIAGNOSIS — M06.9 RHEUMATOID ARTHRITIS, INVOLVING UNSPECIFIED SITE, UNSPECIFIED WHETHER RHEUMATOID FACTOR PRESENT: Primary | ICD-10-CM

## 2023-09-11 DIAGNOSIS — H52.13 MYOPIA WITH ASTIGMATISM AND PRESBYOPIA, BILATERAL: ICD-10-CM

## 2023-09-11 DIAGNOSIS — Z13.5 GLAUCOMA SCREENING: ICD-10-CM

## 2023-09-11 PROCEDURE — 92004 PR EYE EXAM, NEW PATIENT,COMPREHESV: ICD-10-PCS | Mod: S$GLB,,, | Performed by: OPTOMETRIST

## 2023-09-11 PROCEDURE — 99999 PR PBB SHADOW E&M-EST. PATIENT-LVL IV: ICD-10-PCS | Mod: PBBFAC,,, | Performed by: OPTOMETRIST

## 2023-09-11 PROCEDURE — 1159F MED LIST DOCD IN RCRD: CPT | Mod: CPTII,S$GLB,, | Performed by: OPTOMETRIST

## 2023-09-11 PROCEDURE — 1159F PR MEDICATION LIST DOCUMENTED IN MEDICAL RECORD: ICD-10-PCS | Mod: CPTII,S$GLB,, | Performed by: OPTOMETRIST

## 2023-09-11 PROCEDURE — 92015 DETERMINE REFRACTIVE STATE: CPT | Mod: S$GLB,,, | Performed by: OPTOMETRIST

## 2023-09-11 PROCEDURE — 92015 PR REFRACTION: ICD-10-PCS | Mod: S$GLB,,, | Performed by: OPTOMETRIST

## 2023-09-11 PROCEDURE — 92004 COMPRE OPH EXAM NEW PT 1/>: CPT | Mod: S$GLB,,, | Performed by: OPTOMETRIST

## 2023-09-11 PROCEDURE — 99999 PR PBB SHADOW E&M-EST. PATIENT-LVL IV: CPT | Mod: PBBFAC,,, | Performed by: OPTOMETRIST

## 2023-09-11 NOTE — PROGRESS NOTES
HPI    Pt presents due to eye pain     States for the past year she has had pain around the left eye. Was   diagnosed with TMJ and told the pain could be from that. States over the   past 6 months she has had burning in the left eye, states usually just in   the afternoon, but last week it would last all day. States she has been   unable to wear makeup on the eye for the past month due to burning and   itching. States no watery eyes. States under the lower lid sometimes   becomes swollen and darker in color.     States no ocular meds     Feels [facility] stable w/ current specs ---2-3 yrs   Notes discomfort w/ OS only vinh in lid skin and adnexa   Some pain temple area x 4-5 yrs           Last edited by ROBIN Tran, OD on 9/11/2023  8:45 AM.            Assessment /Plan     For exam results, see Encounter Report.    Rheumatoid arthritis, involving unspecified site, unspecified whether rheumatoid factor present    Neuralgic facial pain    Vitreous floaters, bilateral    Glaucoma screening    Myopia with astigmatism and presbyopia, bilateral      1,2.   No gross uveitis   No other ocualr associations with systemic issues   Longstanding temporal / scalp pain left sided  Does not appears GCA suspect     3.   RD precautions given and reviewed. Patient knows to call/ message if any further changes in symptoms occur.  4.   Not suspect   5.   Updated specs for distance vision   Uncorrected for near    Try otc ATs tid+ vinh reading / computer     Discussed and educated patient on current findings /plan.  RTC 1 year, prn if any changes / issues

## 2023-09-11 NOTE — PATIENT INSTRUCTIONS
"DRY EYES -- BURNING OR WHITNEY SYMPTOMS:  Use Over The Counter artificial tears as needed for dry eye symptoms.   Some common brands include:  Systane, Optive, Refresh, and Thera-Tears.  These drops can be used as frequently as desired, but may be most helpful use during long periods of concentrated work.  For example, reading / working at the computer. Start with 3-4x per day.     Nighttime Ophthalmic gel or ointments are available: Refresh PM, Genteal, and Lacrilube.    Avoid drops that "get redness out" (Visine, Murine, Clear Eyes), as these may contain medication that could further irritate the eyes, especially with chronic use.    ALLERGY EYES -- ITCHING SYMPTOMS:  Over the counter medications include--Pataday, Zaditor, and Alaway.  Use as directed 1-2 drops daily for symptoms of itching / watering eyes.  These drops will not help for dry eye or exposure symptoms.    REDNESS RELIEF:  Lumify---is a good redness reliever that will not cause irritation if used chronically.        FLASHES / FLOATERS / POSTERIOR VITREOUS DETACHMENT    Call the clinic if you have any further changes in symptoms.  Including:  Increased numbers of floaters or flashing lights, dimness or darkness that moves through or stays constant in your vision, or any pain in the eye (s).    You may sometimes see small specks or clouds moving in your field of vision.  They are called FLOATERS.  You can often see them when looking at a plain background, like a blank wall or blue omar.  Floaters are actually tiny clumps of gel or cells inside the VITREOUS, the clear jelly-like fluid that fills the inside of your eye.    While these objects look like they are in front of your eye, they are actually floating inside.  What you see are the shadows they cast on the RETINA, the nerve layer at the back of the eye that senses light and allows you to see.      POSTERIOR VITREOUS DETACHMENT    The appearance of new floaters may be alarming.  If you suddenly " develop new floaters, you should contact your eye care professional  right away.    The retina can tear if the shrinking vitreous pulls away from the wall of the eye.  This sometimes causes a small amount of bleeding in the eye that may appear as new floaters.    A torn retina is always a serious problem, since it can lead to a retinal detachment.  You should see your eye care professional as soon as possible if:    even one new floater appears suddenly;  you see sudden flashes of light;  you notice other symptoms, like the loss of side vision, or a curtain closes down in your vision        POSTERIOR VITREOUS DETACHMENT is more common for people who:    are nearsighted;  have had cataract surgery;  have had YAG laser surgery of the eye;  have had inflammation inside the eye;  are over age 60.      While some floaters may remain visible, many of them will fade over time and become less noticeable.  Even if you've had some floaters for years, you should have your eyes checked as soon as possible if you notice new ones.    FLASHING LIGHTS    When the vitreous gel rubs or pulls on the retina, you may see what look like flashing lights or lightning streaks.  These flashes can appear off and on for several weeks or months.      Some people experience flashes of light that appear as jagged lines or heat waves in both eyes, lasting 10-20 minutes.  These flashes are caused by a spasm of blood vessels in the brain, which is called a migraine.    If a headache follows these flashes, it's called a migraine headache.  If   no headache occurs, these flashes are called Ophthalmic or Ocular Migraine.

## 2023-09-11 NOTE — TELEPHONE ENCOUNTER
No care due was identified.  [facility] Embedded Care Due Messages. Reference number: [id number].   9/11/2023 5:55:36 PM CDT

## 2023-09-12 RX ORDER — VENLAFAXINE HYDROCHLORIDE 75 MG/1
CAPSULE, EXTENDED RELEASE ORAL
Qty: 30 CAPSULE | Refills: 0 | Status: SHIPPED | OUTPATIENT
Start: 2023-09-12 | End: 2023-10-11 | Stop reason: SDUPTHER

## 2023-09-12 RX ORDER — TRAZODONE HYDROCHLORIDE 50 MG/1
TABLET ORAL
Qty: 30 TABLET | Refills: 0 | Status: SHIPPED | OUTPATIENT
Start: 2023-09-12 | End: 2023-10-11 | Stop reason: SDUPTHER

## 2023-09-14 ENCOUNTER — PATIENT MESSAGE (OUTPATIENT)
Dept: NEUROLOGY | Facility: CLINIC | Age: 43
End: 2023-09-14
Payer: COMMERCIAL

## 2023-09-14 DIAGNOSIS — R68.84 JAW PAIN: ICD-10-CM

## 2023-09-14 DIAGNOSIS — M54.2 CERVICALGIA: Primary | ICD-10-CM

## 2023-09-14 DIAGNOSIS — R51.9 LEFT FACIAL PAIN: ICD-10-CM

## 2023-09-14 RX ORDER — TOPIRAMATE 25 MG/1
25 TABLET ORAL DAILY
COMMUNITY
End: 2023-09-19 | Stop reason: SDUPTHER

## 2023-09-18 ENCOUNTER — PATIENT MESSAGE (OUTPATIENT)
Dept: NEUROLOGY | Facility: CLINIC | Age: 43
End: 2023-09-18
Payer: COMMERCIAL

## 2023-09-19 RX ORDER — TOPIRAMATE 25 MG/1
TABLET ORAL
Qty: 90 TABLET | Refills: 1 | Status: SHIPPED | OUTPATIENT
Start: 2023-09-19 | End: 2024-03-17 | Stop reason: SDUPTHER

## 2023-10-11 RX ORDER — TRAZODONE HYDROCHLORIDE 50 MG/1
TABLET ORAL
Qty: 90 TABLET | Refills: 0 | Status: SHIPPED | OUTPATIENT
Start: 2023-10-11 | End: 2023-12-04

## 2023-10-11 RX ORDER — VENLAFAXINE HYDROCHLORIDE 75 MG/1
CAPSULE, EXTENDED RELEASE ORAL
Qty: 90 CAPSULE | Refills: 0 | Status: SHIPPED | OUTPATIENT
Start: 2023-10-11 | End: 2023-12-04

## 2023-10-11 NOTE — TELEPHONE ENCOUNTER
Care Due:                  Date            Visit Type   Department     Provider  --------------------------------------------------------------------------------                                MYCHART                              FOLLOWUP/OF  [facility] FAMILY  Last Visit: 01-      FICE VISIT   MEDICINE       Berenice Garrido  Next Visit: None Scheduled  None         None Found                                                            Last  Test          Frequency    Reason                     Performed    Due Date  --------------------------------------------------------------------------------    HBA1C.......  6 months...  semaglutide,.............  Not Found    Overdue    [employer] Embedded Care Due Messages. Reference number: [id number].   10/11/2023 5:08:46 AM CDT

## 2023-10-11 NOTE — TELEPHONE ENCOUNTER
Refill Routing Note   Medication(s) are not appropriate for processing by [facility] for the following reason(s):      New or recently adjusted medication    [facility] action(s):  Defer Care Due:  Labs due            Appointments  past 12m or future 3m with PCP    Date Provider   Last Visit   1/3/2023 Berenice Garrido MD   Next Visit   Visit date not found Berenice Garrido MD   ED visits in past 90 days: 0        Note composed:7:42 AM 10/11/2023

## 2023-10-13 ENCOUNTER — OFFICE VISIT (OUTPATIENT)
Dept: FAMILY MEDICINE | Facility: CLINIC | Age: 43
End: 2023-10-13
Payer: COMMERCIAL

## 2023-10-13 VITALS
WEIGHT: 158.19 LBS | DIASTOLIC BLOOD PRESSURE: 72 MMHG | BODY MASS INDEX: 24.77 KG/M2 | SYSTOLIC BLOOD PRESSURE: 112 MMHG | OXYGEN SATURATION: 98 % | HEART RATE: 80 BPM

## 2023-10-13 DIAGNOSIS — H66.91 ACUTE OTITIS MEDIA, RIGHT: Primary | ICD-10-CM

## 2023-10-13 PROCEDURE — 99999 PR PBB SHADOW E&M-EST. PATIENT-LVL V: ICD-10-PCS | Mod: PBBFAC,,, | Performed by: INTERNAL MEDICINE

## 2023-10-13 PROCEDURE — 3074F SYST BP LT 130 MM HG: CPT | Mod: CPTII,S$GLB,, | Performed by: INTERNAL MEDICINE

## 2023-10-13 PROCEDURE — 1159F PR MEDICATION LIST DOCUMENTED IN MEDICAL RECORD: ICD-10-PCS | Mod: CPTII,S$GLB,, | Performed by: INTERNAL MEDICINE

## 2023-10-13 PROCEDURE — 1160F PR REVIEW ALL MEDS BY PRESCRIBER/CLIN PHARMACIST DOCUMENTED: ICD-10-PCS | Mod: CPTII,S$GLB,, | Performed by: INTERNAL MEDICINE

## 2023-10-13 PROCEDURE — 99999 PR PBB SHADOW E&M-EST. PATIENT-LVL V: CPT | Mod: PBBFAC,,, | Performed by: INTERNAL MEDICINE

## 2023-10-13 PROCEDURE — 99213 PR OFFICE/OUTPT VISIT, EST, LEVL III, 20-29 MIN: ICD-10-PCS | Mod: S$GLB,,, | Performed by: INTERNAL MEDICINE

## 2023-10-13 PROCEDURE — 3008F PR BODY MASS INDEX (BMI) DOCUMENTED: ICD-10-PCS | Mod: CPTII,S$GLB,, | Performed by: INTERNAL MEDICINE

## 2023-10-13 PROCEDURE — 1159F MED LIST DOCD IN RCRD: CPT | Mod: CPTII,S$GLB,, | Performed by: INTERNAL MEDICINE

## 2023-10-13 PROCEDURE — 3078F PR MOST RECENT DIASTOLIC BLOOD PRESSURE < 80 MM HG: ICD-10-PCS | Mod: CPTII,S$GLB,, | Performed by: INTERNAL MEDICINE

## 2023-10-13 PROCEDURE — 1160F RVW MEDS BY RX/DR IN RCRD: CPT | Mod: CPTII,S$GLB,, | Performed by: INTERNAL MEDICINE

## 2023-10-13 PROCEDURE — 3008F BODY MASS INDEX DOCD: CPT | Mod: CPTII,S$GLB,, | Performed by: INTERNAL MEDICINE

## 2023-10-13 PROCEDURE — 99213 OFFICE O/P EST LOW 20 MIN: CPT | Mod: S$GLB,,, | Performed by: INTERNAL MEDICINE

## 2023-10-13 PROCEDURE — 3074F PR MOST RECENT SYSTOLIC BLOOD PRESSURE < 130 MM HG: ICD-10-PCS | Mod: CPTII,S$GLB,, | Performed by: INTERNAL MEDICINE

## 2023-10-13 PROCEDURE — 3078F DIAST BP <80 MM HG: CPT | Mod: CPTII,S$GLB,, | Performed by: INTERNAL MEDICINE

## 2023-10-13 RX ORDER — AMOXICILLIN AND CLAVULANATE POTASSIUM 875; 125 MG/1; MG/1
1 TABLET, FILM COATED ORAL EVERY 12 HOURS
Qty: 14 TABLET | Refills: 0 | Status: SHIPPED | OUTPATIENT
Start: 2023-10-13 | End: 2023-10-20

## 2023-10-13 NOTE — PROGRESS NOTES
Subjective     Pura Gudino is a 43 y.o. old, female here for Otalgia    Patient was babysitting recently a sick child and has been sick the past 3 days. Symptoms were initially improving but seemed to worsen with sneezing, congestion, etc.    ROS  Medications     Outpatient Medications Marked as Taking for the 10/13/23 encounter (Office Visit) with Ash Ying MD   Medication Sig Dispense Refill    albuterol (PROVENTIL) 2.5 mg /3 mL (0.083 %) nebulizer solution Take 3 mLs (2.5 mg total) by nebulization every 6 (six) hours as needed for Wheezing. Rescue 3 mL 4    albuterol (PROVENTIL/VENTOLIN HFA) 90 mcg/actuation inhaler Inhale 2 puffs into the lungs every 4 (four) hours as needed for Wheezing or Shortness of Breath. Rescue 8 g 2    azelastine (ASTELIN) 137 mcg (0.1 %) nasal spray Use 1 spray (137 mcg total) in each nostril 2 (two) times daily. 30 mL 0    buPROPion (WELLBUTRIN XL) 300 MG 24 hr tablet Take 1 tablet (300 mg total) by mouth once daily. 30 tablet 1    clobetasoL (TEMOVATE) 0.05 % external solution Apply topically 2 (two) times daily. Apply to the scalp 50 mL 1    clonazePAM (KLONOPIN) 0.5 MG tablet Take 0.5 mg by mouth daily as needed.      clonazePAM (KLONOPIN) 0.5 MG tablet Take 1 and 1/2 tablets by mouth once a day as needed for anxiety 30 days 45 tablet 1    cyclobenzaprine (FLEXERIL) 5 MG tablet Take 1 tablet by mouth every evening as needed for muscle spasms. Caution: May cause drowsiness. 15 tablet 0    DUPIXENT  mg/2 mL PnIj Inject into the skin.      EPICERAM Ramon Apply topically 2 (two) times daily.      etanercept (ENBREL SURECLICK) 50 mg/mL (1 mL) Inject 1 mL (50 mg total) into the skin every 7 days. 12 mL 1    fluticasone propionate (FLONASE) 50 mcg/actuation nasal spray SHAKE LIQUID AND USE 2 SPRAYS(100 MCG) IN EACH NOSTRIL EVERY DAY FOR 14 DAYS 48 g 0    fremanezumab-vfrm (AJOVY AUTOINJECTOR) 225 mg/1.5 mL autoinjector Inject 1.5 mL (225 mg total) into the  skin every 30 days. 1.5 mL 11    montelukast (SINGULAIR) 10 mg tablet TAKE 1 TABLET(10 MG) BY MOUTH EVERY EVENING 90 tablet 1    nebulizer accessories Kit 1 kit by Misc.(Non-Drug; Combo Route) route daily as needed. 1 kit 0    propranoloL (INDERAL LA) 80 MG 24 hr capsule TAKE 1 CAPSULE(80 MG) BY MOUTH EVERY NIGHT. 90 capsule 3    semaglutide, weight loss, (WEGOVY) 0.25 mg/0.5 mL PnIj Inject 0.25 mg into the skin every 7 days. 0.5 mL 4    tacrolimus (PROTOPIC) 0.1 % ointment Apply 1 application topically 2 (two) times daily.      topiramate (TOPAMAX) 100 MG tablet Take 1 tablet by mouth every night two hours before bed 90 tablet 1    topiramate (TOPAMAX) 25 MG tablet Take 1 tablet (25 mg) by mouth every morning and keep 100 mg (separate Rx) at night 90 tablet 1    traZODone (DESYREL) 50 MG tablet Take 1 tablet (50 mg total) by mouth nightly. 30 tablet 1    traZODone (DESYREL) 50 MG tablet Take 1 tablet by mouth nightly at bedtime as needed 90 tablet 0    ubrogepant (UBRELVY) 100 mg tablet Take 1 tablet by mouth at onset of headache. Take second tablet 2 hours later, if needed. No more than 2 tablets/day OR 3 days use per week. 12 tablet 6    venlafaxine (EFFEXOR XR) 75 MG 24 hr capsule Take 1 capsule (75 mg total) by mouth once daily with food. 30 capsule 1    venlafaxine (EFFEXOR XR) 75 MG 24 hr capsule Take 1 capsule by mouth daily with food 90 capsule 0    venlafaxine (EFFEXOR-XR) 150 MG Cp24 Take 150 mg by mouth once daily.      venlafaxine (EFFEXOR-XR) 150 MG Cp24 Take 1 capsule by mouth daily 30 capsule 0    venlafaxine (EFFEXOR-XR) 150 MG Cp24 Take 1 capsule (150 mg total) by mouth once daily. 30 capsule 1    VIOS AEROSOL DELIVERY SYSTEM Larissa USE DAILY AS NEEDED       Objective     /72   Pulse 80   Wt 71.7 kg (158 lb 2.9 oz)   LMP 05/23/2018   SpO2 98%   BMI 24.77 kg/m²   Physical Exam  Constitutional:       General: She is not in acute distress.     Appearance: She is well-developed.   HENT:       Head: Normocephalic and atraumatic.      Right Ear: External ear normal. Tympanic membrane is erythematous and bulging.      Left Ear: External ear normal. Tympanic membrane is erythematous.      Nose: Congestion present.   Eyes:      General:         Right eye: No discharge.         Left eye: No discharge.      Conjunctiva/sclera: Conjunctivae normal.   Cardiovascular:      Rate and Rhythm: Normal rate and regular rhythm.      Heart sounds: No murmur heard.  Pulmonary:      Effort: Pulmonary effort is normal. No respiratory distress.      Breath sounds: Normal breath sounds.   Musculoskeletal:      Cervical back: Neck supple.   Lymphadenopathy:      Cervical: No cervical adenopathy.       Assessment and Plan     Acute otitis media, right  -     amoxicillin-clavulanate 875-125mg (AUGMENTIN) 875-125 mg per tablet; Take 1 tablet by mouth every 12 (twelve) hours. for 7 days  Dispense: 14 tablet; Refill: 0      ___________________  Ash Ying MD  Internal Medicine and Pediatrics

## 2023-10-24 NOTE — TELEPHONE ENCOUNTER
No care due was identified.  [facility] Embedded Care Due Messages. Reference number: [id number].   10/24/2023 4:39:48 PM CDT

## 2023-10-25 RX ORDER — VENLAFAXINE HYDROCHLORIDE 150 MG/1
CAPSULE, EXTENDED RELEASE ORAL
Qty: 30 CAPSULE | Refills: 0 | Status: SHIPPED | OUTPATIENT
Start: 2023-10-25 | End: 2023-12-04

## 2023-11-02 DIAGNOSIS — D84.821 DRUG-INDUCED IMMUNODEFICIENCY: ICD-10-CM

## 2023-11-02 DIAGNOSIS — Z79.899 DRUG-INDUCED IMMUNODEFICIENCY: ICD-10-CM

## 2023-11-02 DIAGNOSIS — J30.1 SEASONAL ALLERGIC RHINITIS DUE TO POLLEN: ICD-10-CM

## 2023-11-02 DIAGNOSIS — M06.00 SERONEGATIVE RHEUMATOID ARTHRITIS: ICD-10-CM

## 2023-11-02 RX ORDER — ETANERCEPT 50 MG/ML
50 SOLUTION SUBCUTANEOUS
Qty: 12 ML | Refills: 1 | Status: ACTIVE | OUTPATIENT
Start: 2023-11-02 | End: 2024-03-14

## 2023-11-02 RX ORDER — AZELASTINE 1 MG/ML
1 SPRAY, METERED NASAL 2 TIMES DAILY
Qty: 60 ML | Refills: 0 | Status: SHIPPED | OUTPATIENT
Start: 2023-11-02 | End: 2023-12-04

## 2023-11-02 NOTE — TELEPHONE ENCOUNTER
Refill Decision Note   Pura Gudino  is requesting a refill authorization.  Brief Assessment and Rationale for Refill:  Approve     Medication Therapy Plan:         Comments:     Note composed:2:01 PM 11/02/2023

## 2023-11-02 NOTE — TELEPHONE ENCOUNTER
No care due was identified.  Health [facility] Embedded Care Due Messages. Reference number: [id number].   11/02/2023 5:08:48 AM CDT

## 2023-11-08 ENCOUNTER — PATIENT MESSAGE (OUTPATIENT)
Dept: RHEUMATOLOGY | Facility: CLINIC | Age: 43
End: 2023-11-08
Payer: COMMERCIAL

## 2023-11-08 NOTE — TELEPHONE ENCOUNTER
No care due was identified.  Health [facility] Embedded Care Due Messages. Reference number: [id number].   11/08/2023 3:22:24 PM CST

## 2023-11-08 NOTE — PROGRESS NOTES
"Subjective:      Patient ID: Pura Gudino is a 43 y.o. female.    Chief Complaint: Injections    HPI    Rheumatologic History:      - Diagnosis/es:              - fibromyalgia  - left rotator cuff tendinopathy  - seronegative (- ANT, RF, CCP, TTG, Endomysial Ab) rheumatoid arthritis initially diagnosed by Dr Regan in 2013  - Positive serologies: -  - Negative serologies:  ANT, RF, CCP, tTG  - Infectious screening labs:  Negative hepatitis-B, C, and QuantiFERON (04/2023)  - Imaging:              - Xray arthritis survey (4/2023) No obvious sequela from inflammatory arthropathy is identified  - Previous Treatments:              - RA Treatment History:   - HCQ  - MTX  - Humira: caused rashes               - Fibromyalgia Treatment History:  - Baclofen  - Flexeril  - Cymbalta  - Effexor  - Prozac   - Zoloft  - Lyrica  - Current Treatments:               - Enbrel  - Birth control: s/p hysterectomy  Interval History:   She presents today for shoulder injection. She states that she has rotator cuff tendinopathy and osteoarthritis in her right shoulder and used to receive injections for this. She denies pain and swelling in any other joints.     Objective:   /77   Pulse 105   Ht 5' 7" (1.702 m)   Wt 71.2 kg (157 lb 1.2 oz)   LMP 05/23/2018   BMI 24.60 kg/m²   Physical Exam   Constitutional: normal appearance.   HENT:   Head: Normocephalic and atraumatic.   Pulmonary/Chest: Effort normal.   Musculoskeletal:      Comments: Tenderness to palpation of the right shoulder  Limited external rotation of the right shoulder  No synovitis, dactylitis, enthesitis, effusions     Neurological: She is alert.   Skin: Skin is warm and dry. No rash noted.      9/5/2023 11/9/2023   Tender (OLIVO-28) 0 / 28  1 / 28    Swollen (OLIVO-28) 0 / 28  0 / 28    Provider Global 20 mm 20 mm   Patient Global 60 mm 20 mm   ESR 24 mm/hr --   CRP 8.1 mg/L --   OLIVO-28 (ESR) 3.06 (Low disease activity) --   OLIVO-28 (CRP) 2.59 (Remission) -- "   CDAI Score 8  5      Assessment:     1. Seronegative rheumatoid arthritis    2. Fibromyalgia    3. Drug-induced immunodeficiency    4. High risk medication use    5. Osteoarthritis, unspecified osteoarthritis type, unspecified site    6. Tendinopathy of rotator cuff, right      This is a 43 year old woman with history of anxiety, endometriosis s/p hysterectomy, childhood seizures, depression, asthma, migraines, IBS, fibromyalgia, left rotator cuff tendinopathy, and seronegative (- ANT, RF, CCP, TTG, Endomysial Ab) rheumatoid arthritis initially diagnosed by Dr Regan in 2013 and on Enbrel (2020- ).  I do not find active inflammatory arthritis on exam.  Right shoulder see us I completed today.     Plan:     Problem List Items Addressed This Visit          Orthopedic    Fibromyalgia     Other Visit Diagnoses       Seronegative rheumatoid arthritis    -  Primary    Drug-induced immunodeficiency        High risk medication use        Osteoarthritis, unspecified osteoarthritis type, unspecified site        Relevant Orders    CBC Auto Differential    Comprehensive Metabolic Panel    Sedimentation rate    C-Reactive Protein    Tendinopathy of rotator cuff, right        Relevant Orders    CBC Auto Differential    Comprehensive Metabolic Panel    Sedimentation rate    C-Reactive Protein          - Enbrel  - CBC, CMP, ESR, CRP every 12 weeks  - Pre-DMARD labs due for repeat 4/2024  - Immunizations: COVID x 4 (most recent 6/2022), flu (10/2022), patient needs the pneumonia vaccines, and Shingrix  - Birth control: s/p hysterectomy    Follow up in 3-4 months    30 minutes of total time spent on the encounter, which includes face to face time and non-face to face time preparing to see the patient (eg, review of tests), Obtaining and/or reviewing separately obtained history, Documenting clinical information in the electronic or other health record, Independently interpreting results (not separately reported) and communicating  results to the patient/family/caregiver, or Care coordination (not separately reported).     This note was prepared with [employer] voice recognition transcription software. Garbled syntax, mangled pronouns, and other bizarre constructions may be attributed to that software system       Kortney Williamson M.D.  Rheumatology Dept  [place], [place]

## 2023-11-09 ENCOUNTER — OFFICE VISIT (OUTPATIENT)
Dept: RHEUMATOLOGY | Facility: CLINIC | Age: 43
End: 2023-11-09
Payer: COMMERCIAL

## 2023-11-09 VITALS
HEART RATE: 105 BPM | HEIGHT: 67 IN | BODY MASS INDEX: 24.65 KG/M2 | SYSTOLIC BLOOD PRESSURE: 112 MMHG | WEIGHT: 157.06 LBS | DIASTOLIC BLOOD PRESSURE: 77 MMHG

## 2023-11-09 DIAGNOSIS — Z79.899 HIGH RISK MEDICATION USE: ICD-10-CM

## 2023-11-09 DIAGNOSIS — M06.00 SERONEGATIVE RHEUMATOID ARTHRITIS: Primary | ICD-10-CM

## 2023-11-09 DIAGNOSIS — D84.821 DRUG-INDUCED IMMUNODEFICIENCY: ICD-10-CM

## 2023-11-09 DIAGNOSIS — M79.7 FIBROMYALGIA: ICD-10-CM

## 2023-11-09 DIAGNOSIS — M67.911 TENDINOPATHY OF ROTATOR CUFF, RIGHT: ICD-10-CM

## 2023-11-09 DIAGNOSIS — Z79.899 DRUG-INDUCED IMMUNODEFICIENCY: ICD-10-CM

## 2023-11-09 DIAGNOSIS — M19.90 OSTEOARTHRITIS, UNSPECIFIED OSTEOARTHRITIS TYPE, UNSPECIFIED SITE: ICD-10-CM

## 2023-11-09 PROCEDURE — 20610 LARGE JOINT ASPIRATION/INJECTION: R GLENOHUMERAL: ICD-10-PCS | Mod: RT,S$GLB,, | Performed by: STUDENT IN AN ORGANIZED HEALTH CARE EDUCATION/TRAINING PROGRAM

## 2023-11-09 PROCEDURE — 1159F PR MEDICATION LIST DOCUMENTED IN MEDICAL RECORD: ICD-10-PCS | Mod: CPTII,S$GLB,, | Performed by: STUDENT IN AN ORGANIZED HEALTH CARE EDUCATION/TRAINING PROGRAM

## 2023-11-09 PROCEDURE — 3074F PR MOST RECENT SYSTOLIC BLOOD PRESSURE < 130 MM HG: ICD-10-PCS | Mod: CPTII,S$GLB,, | Performed by: STUDENT IN AN ORGANIZED HEALTH CARE EDUCATION/TRAINING PROGRAM

## 2023-11-09 PROCEDURE — 3074F SYST BP LT 130 MM HG: CPT | Mod: CPTII,S$GLB,, | Performed by: STUDENT IN AN ORGANIZED HEALTH CARE EDUCATION/TRAINING PROGRAM

## 2023-11-09 PROCEDURE — 3008F PR BODY MASS INDEX (BMI) DOCUMENTED: ICD-10-PCS | Mod: CPTII,S$GLB,, | Performed by: STUDENT IN AN ORGANIZED HEALTH CARE EDUCATION/TRAINING PROGRAM

## 2023-11-09 PROCEDURE — 99999 PR PBB SHADOW E&M-EST. PATIENT-LVL V: ICD-10-PCS | Mod: PBBFAC,,, | Performed by: STUDENT IN AN ORGANIZED HEALTH CARE EDUCATION/TRAINING PROGRAM

## 2023-11-09 PROCEDURE — 99214 OFFICE O/P EST MOD 30 MIN: CPT | Mod: 25,S$GLB,, | Performed by: STUDENT IN AN ORGANIZED HEALTH CARE EDUCATION/TRAINING PROGRAM

## 2023-11-09 PROCEDURE — 99999 PR PBB SHADOW E&M-EST. PATIENT-LVL V: CPT | Mod: PBBFAC,,, | Performed by: STUDENT IN AN ORGANIZED HEALTH CARE EDUCATION/TRAINING PROGRAM

## 2023-11-09 PROCEDURE — 20610 DRAIN/INJ JOINT/BURSA W/O US: CPT | Mod: RT,S$GLB,, | Performed by: STUDENT IN AN ORGANIZED HEALTH CARE EDUCATION/TRAINING PROGRAM

## 2023-11-09 PROCEDURE — 3078F DIAST BP <80 MM HG: CPT | Mod: CPTII,S$GLB,, | Performed by: STUDENT IN AN ORGANIZED HEALTH CARE EDUCATION/TRAINING PROGRAM

## 2023-11-09 PROCEDURE — 3078F PR MOST RECENT DIASTOLIC BLOOD PRESSURE < 80 MM HG: ICD-10-PCS | Mod: CPTII,S$GLB,, | Performed by: STUDENT IN AN ORGANIZED HEALTH CARE EDUCATION/TRAINING PROGRAM

## 2023-11-09 PROCEDURE — 3008F BODY MASS INDEX DOCD: CPT | Mod: CPTII,S$GLB,, | Performed by: STUDENT IN AN ORGANIZED HEALTH CARE EDUCATION/TRAINING PROGRAM

## 2023-11-09 PROCEDURE — 99214 PR OFFICE/OUTPT VISIT, EST, LEVL IV, 30-39 MIN: ICD-10-PCS | Mod: 25,S$GLB,, | Performed by: STUDENT IN AN ORGANIZED HEALTH CARE EDUCATION/TRAINING PROGRAM

## 2023-11-09 PROCEDURE — 1159F MED LIST DOCD IN RCRD: CPT | Mod: CPTII,S$GLB,, | Performed by: STUDENT IN AN ORGANIZED HEALTH CARE EDUCATION/TRAINING PROGRAM

## 2023-11-09 RX ORDER — BUPROPION HYDROCHLORIDE 300 MG/1
300 TABLET ORAL DAILY
Qty: 30 TABLET | Refills: 1 | Status: SHIPPED | OUTPATIENT
Start: 2023-11-09 | End: 2024-01-08 | Stop reason: SDUPTHER

## 2023-11-09 RX ORDER — TRIAMCINOLONE ACETONIDE 40 MG/ML
40 INJECTION, SUSPENSION INTRA-ARTICULAR; INTRAMUSCULAR
Status: DISCONTINUED | OUTPATIENT
Start: 2023-11-09 | End: 2023-11-09 | Stop reason: HOSPADM

## 2023-11-09 RX ORDER — LIDOCAINE HYDROCHLORIDE 10 MG/ML
4 INJECTION INFILTRATION; PERINEURAL
Status: DISCONTINUED | OUTPATIENT
Start: 2023-11-09 | End: 2023-11-09 | Stop reason: HOSPADM

## 2023-11-09 RX ADMIN — LIDOCAINE HYDROCHLORIDE 4 ML: 10 INJECTION INFILTRATION; PERINEURAL at 11:11

## 2023-11-09 RX ADMIN — TRIAMCINOLONE ACETONIDE 40 MG: 40 INJECTION, SUSPENSION INTRA-ARTICULAR; INTRAMUSCULAR at 11:11

## 2023-11-09 NOTE — PROCEDURES
Large Joint Aspiration/Injection: R glenohumeral    Date/Time: 11/9/2023 11:00 AM    Performed by: Kortney Williamson MD  Authorized by: Kortney Williamson MD    Consent Done?:  Yes (Verbal)  Indications:  Pain  Site marked: the procedure site was marked    Timeout: prior to procedure the correct patient, procedure, and site was verified      Details:  Needle Size:  25 G  Ultrasonic Guidance for needle placement?: No    Approach:  Posterior  Location:  Shoulder  Site:  R glenohumeral  Medications:  4 mL LIDOcaine HCL 10 mg/ml (1%) 10 mg/mL (1 %); 40 mg triamcinolone acetonide 40 mg/mL  Patient tolerance:  Patient tolerated the procedure well with no immediate complications

## 2023-11-24 ENCOUNTER — OFFICE VISIT (OUTPATIENT)
Dept: FAMILY MEDICINE | Facility: CLINIC | Age: 43
End: 2023-11-24
Payer: COMMERCIAL

## 2023-11-24 VITALS
TEMPERATURE: 98 F | BODY MASS INDEX: 24.71 KG/M2 | SYSTOLIC BLOOD PRESSURE: 108 MMHG | HEIGHT: 67 IN | DIASTOLIC BLOOD PRESSURE: 72 MMHG | HEART RATE: 97 BPM | WEIGHT: 157.44 LBS | OXYGEN SATURATION: 99 %

## 2023-11-24 DIAGNOSIS — M79.7 FIBROMYALGIA: ICD-10-CM

## 2023-11-24 DIAGNOSIS — F31.9 BIPOLAR AFFECTIVE DISORDER, REMISSION STATUS UNSPECIFIED: ICD-10-CM

## 2023-11-24 DIAGNOSIS — J02.0 STREP PHARYNGITIS: Primary | ICD-10-CM

## 2023-11-24 DIAGNOSIS — K58.1 IRRITABLE BOWEL SYNDROME WITH CONSTIPATION: ICD-10-CM

## 2023-11-24 DIAGNOSIS — F41.1 GENERALIZED ANXIETY DISORDER: ICD-10-CM

## 2023-11-24 DIAGNOSIS — K21.9 GASTROESOPHAGEAL REFLUX DISEASE, UNSPECIFIED WHETHER ESOPHAGITIS PRESENT: ICD-10-CM

## 2023-11-24 LAB
CTP QC/QA: YES
MOLECULAR STREP A: POSITIVE
POC MOLECULAR INFLUENZA A AGN: NEGATIVE
POC MOLECULAR INFLUENZA B AGN: NEGATIVE
SARS-COV-2 RDRP RESP QL NAA+PROBE: NEGATIVE

## 2023-11-24 PROCEDURE — 87502 POCT INFLUENZA A/B MOLECULAR: ICD-10-PCS | Mod: QW,S$GLB,, | Performed by: PHYSICIAN ASSISTANT

## 2023-11-24 PROCEDURE — 87651 POCT STREP A MOLECULAR: ICD-10-PCS | Mod: QW,S$GLB,, | Performed by: PHYSICIAN ASSISTANT

## 2023-11-24 PROCEDURE — 3074F PR MOST RECENT SYSTOLIC BLOOD PRESSURE < 130 MM HG: ICD-10-PCS | Mod: CPTII,S$GLB,, | Performed by: PHYSICIAN ASSISTANT

## 2023-11-24 PROCEDURE — 3008F BODY MASS INDEX DOCD: CPT | Mod: CPTII,S$GLB,, | Performed by: PHYSICIAN ASSISTANT

## 2023-11-24 PROCEDURE — 99999 PR PBB SHADOW E&M-EST. PATIENT-LVL V: ICD-10-PCS | Mod: PBBFAC,,, | Performed by: PHYSICIAN ASSISTANT

## 2023-11-24 PROCEDURE — 87502 INFLUENZA DNA AMP PROBE: CPT | Mod: QW,S$GLB,, | Performed by: PHYSICIAN ASSISTANT

## 2023-11-24 PROCEDURE — 99999 PR PBB SHADOW E&M-EST. PATIENT-LVL V: CPT | Mod: PBBFAC,,, | Performed by: PHYSICIAN ASSISTANT

## 2023-11-24 PROCEDURE — 99214 PR OFFICE/OUTPT VISIT, EST, LEVL IV, 30-39 MIN: ICD-10-PCS | Mod: S$GLB,,, | Performed by: PHYSICIAN ASSISTANT

## 2023-11-24 PROCEDURE — 1160F PR REVIEW ALL MEDS BY PRESCRIBER/CLIN PHARMACIST DOCUMENTED: ICD-10-PCS | Mod: CPTII,S$GLB,, | Performed by: PHYSICIAN ASSISTANT

## 2023-11-24 PROCEDURE — 1160F RVW MEDS BY RX/DR IN RCRD: CPT | Mod: CPTII,S$GLB,, | Performed by: PHYSICIAN ASSISTANT

## 2023-11-24 PROCEDURE — 3074F SYST BP LT 130 MM HG: CPT | Mod: CPTII,S$GLB,, | Performed by: PHYSICIAN ASSISTANT

## 2023-11-24 PROCEDURE — 87635 SARS-COV-2 COVID-19 AMP PRB: CPT | Mod: QW,S$GLB,, | Performed by: PHYSICIAN ASSISTANT

## 2023-11-24 PROCEDURE — 99214 OFFICE O/P EST MOD 30 MIN: CPT | Mod: S$GLB,,, | Performed by: PHYSICIAN ASSISTANT

## 2023-11-24 PROCEDURE — 3008F PR BODY MASS INDEX (BMI) DOCUMENTED: ICD-10-PCS | Mod: CPTII,S$GLB,, | Performed by: PHYSICIAN ASSISTANT

## 2023-11-24 PROCEDURE — 87651 STREP A DNA AMP PROBE: CPT | Mod: QW,S$GLB,, | Performed by: PHYSICIAN ASSISTANT

## 2023-11-24 PROCEDURE — 3078F PR MOST RECENT DIASTOLIC BLOOD PRESSURE < 80 MM HG: ICD-10-PCS | Mod: CPTII,S$GLB,, | Performed by: PHYSICIAN ASSISTANT

## 2023-11-24 PROCEDURE — 87635: ICD-10-PCS | Mod: QW,S$GLB,, | Performed by: PHYSICIAN ASSISTANT

## 2023-11-24 PROCEDURE — 1159F MED LIST DOCD IN RCRD: CPT | Mod: CPTII,S$GLB,, | Performed by: PHYSICIAN ASSISTANT

## 2023-11-24 PROCEDURE — 3078F DIAST BP <80 MM HG: CPT | Mod: CPTII,S$GLB,, | Performed by: PHYSICIAN ASSISTANT

## 2023-11-24 PROCEDURE — 1159F PR MEDICATION LIST DOCUMENTED IN MEDICAL RECORD: ICD-10-PCS | Mod: CPTII,S$GLB,, | Performed by: PHYSICIAN ASSISTANT

## 2023-11-24 RX ORDER — FLUCONAZOLE 150 MG/1
150 TABLET ORAL DAILY
Qty: 1 TABLET | Refills: 0 | Status: SHIPPED | OUTPATIENT
Start: 2023-11-24 | End: 2023-11-25

## 2023-11-24 RX ORDER — AMOXICILLIN 500 MG/1
500 CAPSULE ORAL EVERY 12 HOURS
Qty: 20 CAPSULE | Refills: 0 | Status: SHIPPED | OUTPATIENT
Start: 2023-11-24 | End: 2023-12-04

## 2023-11-24 RX ORDER — LIDOCAINE HYDROCHLORIDE 20 MG/ML
SOLUTION OROPHARYNGEAL
Qty: 100 ML | Refills: 0 | Status: SHIPPED | OUTPATIENT
Start: 2023-11-24

## 2023-11-24 RX ORDER — NAPROXEN 375 MG/1
375 TABLET ORAL 2 TIMES DAILY
Qty: 10 TABLET | Refills: 0 | Status: SHIPPED | OUTPATIENT
Start: 2023-11-24 | End: 2023-11-29

## 2023-11-24 RX ORDER — TOPIRAMATE 100 MG/1
TABLET, FILM COATED ORAL
Qty: 90 TABLET | Refills: 1 | Status: SHIPPED | OUTPATIENT
Start: 2023-11-24

## 2023-11-24 NOTE — PROGRESS NOTES
Subjective     Patient ID: Pura Gudino is a 43 y.o. female.    Chief Complaint: Sore Throat (For about 2 weeks. /), Otalgia, Generalized Body Aches, Cough, and Nasal Congestion      Sore Throat   This is a recurrent problem. The current episode started 1 to 4 weeks ago. The problem has been rapidly worsening. Neither side of throat is experiencing more pain than the other. The maximum temperature recorded prior to her arrival was 101 - 101.9 F. The fever has been present for 1 to 2 days. The pain is at a severity of 9/10. The pain is moderate. Associated symptoms include congestion, coughing, ear pain, headaches, a plugged ear sensation, swollen glands and trouble swallowing. Pertinent negatives include no abdominal pain, diarrhea, drooling, ear discharge, hoarse voice, neck pain, shortness of breath, stridor or vomiting. She has had no exposure to strep or mono. She has tried NSAIDs and cool liquids for the symptoms. The treatment provided no relief.         Pt is new to me, PCP Dr. Garrido.     Pt is a 43 year old female with BPD, MATT, PCOS, RA, IBS, GERD, fibromyalgia. She presents today complaining of fever, sore throat, ear pain. Sore throat started almost two weeks ago, and the body aches and ear pain started a few days ago. No fever today. Temp last night was 101. Swallowing is painful, but no true dysphagia or difficulty moving air.     Review of Systems   Constitutional:  Positive for appetite change, fatigue and fever.   HENT:  Positive for nasal congestion, ear pain, sore throat and trouble swallowing. Negative for drooling, ear discharge and hoarse voice.    Respiratory:  Positive for cough. Negative for shortness of breath and stridor.    Gastrointestinal:  Negative for abdominal pain, diarrhea and vomiting.   Musculoskeletal:  Positive for myalgias. Negative for neck pain.   Neurological:  Positive for headaches.          Objective     Physical Exam  Constitutional:       General: She is not  in acute distress.     Appearance: Normal appearance. She is not ill-appearing, toxic-appearing or diaphoretic.   HENT:      Head: Normocephalic and atraumatic.      Right Ear: Tympanic membrane, ear canal and external ear normal. There is no impacted cerumen.      Left Ear: Tympanic membrane, ear canal and external ear normal. There is no impacted cerumen.      Mouth/Throat:      Pharynx: Oropharyngeal exudate and posterior oropharyngeal erythema present.      Comments: 2+ tonsils with exudate and erythema  Cardiovascular:      Heart sounds: Normal heart sounds.   Pulmonary:      Effort: No respiratory distress.      Breath sounds: Normal breath sounds.   Neurological:      General: No focal deficit present.      Mental Status: She is alert and oriented to person, place, and time.   Psychiatric:         Mood and Affect: Mood normal.         Behavior: Behavior normal.         Thought Content: Thought content normal.         Judgment: Judgment normal.       1. Strep pharyngitis  - amoxicillin (AMOXIL) 500 MG capsule; Take 1 capsule (500 mg total) by mouth every 12 (twelve) hours for 10 days. Take with food and daily probiotic.  Dispense: 20 capsule; Refill: 0  - fluconazole (DIFLUCAN) 150 MG Tab; Take 1 tablet (150 mg total) by mouth once daily for 1 day  Dispense: 1 tablet; Refill: 0---ONLY take if symptoms develop  - naproxen (EC-NAPROSYN) 375 MG TbEC EC tablet; Take 1 tablet (375 mg total) by mouth 2 (two) times daily. for 5 days  Dispense: 10 tablet; Refill: 0  - POCT Influenza A/B Molecular  - POCT COVID-19 Rapid Screening  - POCT Strep A, Molecular    2. Bipolar affective disorder, remission status unspecified  -stable, continue chronic meds    3. Generalized anxiety disorder  -stable, continue chronic meds    4. Gastroesophageal reflux disease, unspecified whether esophagitis present  -stable, no symptoms at this time, will be fine with sort term nsaid, continue chronic meds    6. Fibromyalgia  -stable,  continue chronic meds      RTC/ER precautions given. F/U if symptoms persist or worsen    Mary Zayas PA-C

## 2023-11-24 NOTE — PATIENT INSTRUCTIONS
A few reminders from today:    Start amoxicillin, finish completely  Naproxen as prescribed  Can take tylenol as needed  Lidocaine gargle as needed  Warm salt water gargles  Cough drops as needed  Honey lemon tea  Hot steamy showers/warm compresses over sinuses  Rest, hydrate, eat healthy  Contagious until 24 hours on antibiotic      Follow up with me if needed.   Please go to ER/urgent care if after hours or symptoms persist/worsen.     Do not hesitate to get in touch with me should you have any further questions.     Thank you for trusting me with your care!  I wish you health and happiness.    -Mary Zayas PA-C

## 2023-12-03 ENCOUNTER — OFFICE VISIT (OUTPATIENT)
Dept: URGENT CARE | Facility: CLINIC | Age: 43
End: 2023-12-03
Payer: COMMERCIAL

## 2023-12-03 VITALS
OXYGEN SATURATION: 97 % | HEART RATE: 85 BPM | TEMPERATURE: 98 F | DIASTOLIC BLOOD PRESSURE: 76 MMHG | SYSTOLIC BLOOD PRESSURE: 114 MMHG

## 2023-12-03 DIAGNOSIS — J06.9 UPPER RESPIRATORY TRACT INFECTION, UNSPECIFIED TYPE: Primary | ICD-10-CM

## 2023-12-03 DIAGNOSIS — J02.9 SORE THROAT: ICD-10-CM

## 2023-12-03 LAB
CTP QC/QA: YES
MOLECULAR STREP A: NEGATIVE
POC MOLECULAR INFLUENZA A AGN: NEGATIVE
POC MOLECULAR INFLUENZA B AGN: NEGATIVE
SARS-COV-2 AG RESP QL IA.RAPID: NEGATIVE

## 2023-12-03 PROCEDURE — 87651 POCT STREP A MOLECULAR: ICD-10-PCS | Mod: QW,S$GLB,, | Performed by: NURSE PRACTITIONER

## 2023-12-03 PROCEDURE — 87502 INFLUENZA DNA AMP PROBE: CPT | Mod: QW,S$GLB,, | Performed by: NURSE PRACTITIONER

## 2023-12-03 PROCEDURE — 87651 STREP A DNA AMP PROBE: CPT | Mod: QW,S$GLB,, | Performed by: NURSE PRACTITIONER

## 2023-12-03 PROCEDURE — 87811 SARS-COV-2 COVID19 W/OPTIC: CPT | Mod: QW,S$GLB,, | Performed by: NURSE PRACTITIONER

## 2023-12-03 PROCEDURE — 99213 OFFICE O/P EST LOW 20 MIN: CPT | Mod: S$GLB,,, | Performed by: NURSE PRACTITIONER

## 2023-12-03 PROCEDURE — 87811 SARS CORONAVIRUS 2 ANTIGEN POCT, MANUAL READ: ICD-10-PCS | Mod: QW,S$GLB,, | Performed by: NURSE PRACTITIONER

## 2023-12-03 PROCEDURE — 99213 PR OFFICE/OUTPT VISIT, EST, LEVL III, 20-29 MIN: ICD-10-PCS | Mod: S$GLB,,, | Performed by: NURSE PRACTITIONER

## 2023-12-03 PROCEDURE — 87502 POCT INFLUENZA A/B MOLECULAR: ICD-10-PCS | Mod: QW,S$GLB,, | Performed by: NURSE PRACTITIONER

## 2023-12-03 NOTE — PROGRESS NOTES
Subjective:      Patient ID: Pura Gudino is a 43 y.o. female.    Vitals:  oral temperature is 98 °F (36.7 °C). Her blood pressure is 114/76 and her pulse is 85. Her oxygen saturation is 97%.     Chief Complaint: Sore Throat (Just finushed antibiotics for strep throat - Entered by patient)    Sore Throat   This is a recurrent problem. The current episode started in the past 7 days. The problem has been unchanged. There has been no fever. Associated symptoms include congestion and coughing. Associated symptoms comments: Sore throat.       HENT:  Positive for congestion and sore throat.    Respiratory:  Positive for cough.       Objective:     Physical Exam   Constitutional: She is oriented to person, place, and time. She appears well-developed. She is cooperative.  Non-toxic appearance. She does not appear ill. No distress.   HENT:   Head: Normocephalic and atraumatic.   Ears:   Right Ear: Hearing, tympanic membrane, external ear and ear canal normal.   Left Ear: Hearing, tympanic membrane, external ear and ear canal normal.   Nose: Nose normal. No mucosal edema, rhinorrhea or nasal deformity. No epistaxis. Right sinus exhibits no maxillary sinus tenderness and no frontal sinus tenderness. Left sinus exhibits no maxillary sinus tenderness and no frontal sinus tenderness.   Mouth/Throat: Uvula is midline, oropharynx is clear and moist and mucous membranes are normal. No trismus in the jaw. Normal dentition. No uvula swelling. No oropharyngeal exudate, posterior oropharyngeal edema or posterior oropharyngeal erythema.   Eyes: Conjunctivae and lids are normal. No scleral icterus.   Neck: Trachea normal and phonation normal. Neck supple. No edema present. No erythema present. No neck rigidity present.   Cardiovascular: Normal rate, regular rhythm, normal heart sounds and normal pulses.   Pulmonary/Chest: Effort normal and breath sounds normal. No respiratory distress. She has no decreased breath sounds. She has  no rhonchi.   Abdominal: Normal appearance.   Musculoskeletal: Normal range of motion.         General: No deformity. Normal range of motion.   Neurological: She is alert and oriented to person, place, and time. She exhibits normal muscle tone. Coordination normal.   Skin: Skin is warm, dry, intact, not diaphoretic and not pale.   Psychiatric: Her speech is normal and behavior is normal. Judgment and thought content normal.   Nursing note and vitals reviewed.      Assessment:     1. Upper respiratory tract infection, unspecified type    2. Sore throat      Results for orders placed or performed in visit on 12/03/23   POCT Strep A, Molecular   Result Value Ref Range    Molecular Strep A, POC Negative Negative     Acceptable Yes    SARS Coronavirus 2 Antigen, POCT Manual Read   Result Value Ref Range    SARS Coronavirus 2 Antigen Negative Negative     Acceptable Yes    POCT Influenza A/B MOLECULAR   Result Value Ref Range    POC Molecular Influenza A Ag Negative Negative, Not Reported    POC Molecular Influenza B Ag Negative Negative, Not Reported     Acceptable Yes        Plan:       Upper respiratory tract infection, unspecified type    Sore throat  -     POCT Strep A, Molecular  -     SARS Coronavirus 2 Antigen, POCT Manual Read  -     POCT Influenza A/B MOLECULAR      Patient Instructions   INSTRUCTIONS:  - Rest.  - Drink plenty of fluids.  - Take Tylenol and/or Ibuprofen as directed as needed for fever/pain.  Do not take more than the recommended dose.  - follow up with your PCP within the next 1-2 weeks as needed.  - You must understand that you have received an Urgent Care treatment only and that you may be released before all of your medical problems are known or treated.   - You, the patient, will arrange for follow up care as instructed.   - If your condition worsens or fails to improve we recommend that you receive another evaluation at the ER immediately or  contact your PCP to discuss your concerns.   - You can call [phone] or [phone] to help schedule an appointment with the appropriate provider.     -If you smoke cigarettes, it would be beneficial for you to stop.

## 2023-12-03 NOTE — PATIENT INSTRUCTIONS

## 2023-12-04 ENCOUNTER — OFFICE VISIT (OUTPATIENT)
Dept: FAMILY MEDICINE | Facility: CLINIC | Age: 43
End: 2023-12-04
Payer: COMMERCIAL

## 2023-12-04 VITALS
HEART RATE: 76 BPM | OXYGEN SATURATION: 99 % | HEIGHT: 67 IN | RESPIRATION RATE: 18 BRPM | TEMPERATURE: 97 F | BODY MASS INDEX: 24.79 KG/M2 | WEIGHT: 157.94 LBS | DIASTOLIC BLOOD PRESSURE: 72 MMHG | SYSTOLIC BLOOD PRESSURE: 110 MMHG

## 2023-12-04 DIAGNOSIS — J02.0 STREP PHARYNGITIS: Primary | ICD-10-CM

## 2023-12-04 PROCEDURE — 3074F PR MOST RECENT SYSTOLIC BLOOD PRESSURE < 130 MM HG: ICD-10-PCS | Mod: CPTII,S$GLB,, | Performed by: NURSE PRACTITIONER

## 2023-12-04 PROCEDURE — 1159F MED LIST DOCD IN RCRD: CPT | Mod: CPTII,S$GLB,, | Performed by: NURSE PRACTITIONER

## 2023-12-04 PROCEDURE — 3078F DIAST BP <80 MM HG: CPT | Mod: CPTII,S$GLB,, | Performed by: NURSE PRACTITIONER

## 2023-12-04 PROCEDURE — 99214 PR OFFICE/OUTPT VISIT, EST, LEVL IV, 30-39 MIN: ICD-10-PCS | Mod: S$GLB,,, | Performed by: NURSE PRACTITIONER

## 2023-12-04 PROCEDURE — 99214 OFFICE O/P EST MOD 30 MIN: CPT | Mod: S$GLB,,, | Performed by: NURSE PRACTITIONER

## 2023-12-04 PROCEDURE — 3074F SYST BP LT 130 MM HG: CPT | Mod: CPTII,S$GLB,, | Performed by: NURSE PRACTITIONER

## 2023-12-04 PROCEDURE — 3008F PR BODY MASS INDEX (BMI) DOCUMENTED: ICD-10-PCS | Mod: CPTII,S$GLB,, | Performed by: NURSE PRACTITIONER

## 2023-12-04 PROCEDURE — 3008F BODY MASS INDEX DOCD: CPT | Mod: CPTII,S$GLB,, | Performed by: NURSE PRACTITIONER

## 2023-12-04 PROCEDURE — 3078F PR MOST RECENT DIASTOLIC BLOOD PRESSURE < 80 MM HG: ICD-10-PCS | Mod: CPTII,S$GLB,, | Performed by: NURSE PRACTITIONER

## 2023-12-04 PROCEDURE — 1159F PR MEDICATION LIST DOCUMENTED IN MEDICAL RECORD: ICD-10-PCS | Mod: CPTII,S$GLB,, | Performed by: NURSE PRACTITIONER

## 2023-12-04 RX ORDER — AZITHROMYCIN 500 MG/1
500 TABLET, FILM COATED ORAL DAILY
Qty: 5 TABLET | Refills: 0 | Status: SHIPPED | OUTPATIENT
Start: 2023-12-04 | End: 2023-12-09

## 2023-12-04 NOTE — PROGRESS NOTES
"Subjective:       Patient ID: Pura Gudino is a 43 y.o. female.    Chief Complaint: Sore Throat  The patient presents today to discuss persistent upper respiratory infection symptoms.  She was diagnosed with strep on 11/24/23.   She was given amoxicillin and tells me she felt better a few days afterwards but then quickly felt poorly again.  She went to an urgent care yesterday and which they tested her for strep and it was negative so she was diagnosed with an upper respiratory infection and told to do symptomatic treatment.  She is still having significant throat pain and tells me she had to get up all night and gargle with warm salt water.  She does have asthma and tells me she has had some wheezing at night and has been using her albuterol inhaler.  HPI  Review of Systems   Constitutional:  Positive for appetite change and fatigue.   HENT:  Positive for congestion, postnasal drip and rhinorrhea.    Eyes:  Negative for pain and redness.   Respiratory:  Positive for cough and wheezing. Negative for choking, chest tightness and shortness of breath.    Cardiovascular:  Negative for chest pain and palpitations.   Gastrointestinal:  Negative for abdominal distention, abdominal pain, constipation, diarrhea, nausea and vomiting.   Endocrine: Negative for polydipsia and polyphagia.   Genitourinary:  Negative for dysuria and hematuria.   Musculoskeletal:  Negative for arthralgias, joint swelling and myalgias.   Skin:  Negative for color change and pallor.   Neurological:  Positive for headaches. Negative for dizziness and light-headedness.       Past medical, surgical, family and social history reviewed.  Objective:     Vitals:    12/04/23 0741   BP: 110/72   Pulse: 76   Resp: 18   Temp: 97.4 °F (36.3 °C)   TempSrc: Temporal   SpO2: 99%   Weight: 71.6 kg (157 lb 15.4 oz)   Height: 5' 7" (1.702 m)   PainSc:   3   PainLoc: Throat     Body mass index is 24.74 kg/m².     Physical Exam  Constitutional:       " Appearance: She is well-developed.   HENT:      Head: Normocephalic and atraumatic.      Right Ear: External ear normal. Tympanic membrane has decreased mobility.      Left Ear: External ear normal. Tympanic membrane has decreased mobility.      Nose: Mucosal edema and rhinorrhea present.      Mouth/Throat:      Pharynx: Posterior oropharyngeal erythema and uvula swelling present.   Eyes:      Conjunctiva/sclera: Conjunctivae normal.      Pupils: Pupils are equal, round, and reactive to light.   Neck:      Trachea: No tracheal deviation.   Cardiovascular:      Rate and Rhythm: Normal rate and regular rhythm.      Heart sounds: No murmur heard.     No friction rub. No gallop.   Pulmonary:      Effort: Pulmonary effort is normal. No respiratory distress.      Breath sounds: Normal breath sounds. No stridor. No wheezing or rales.   Musculoskeletal:         General: Normal range of motion.      Cervical back: Normal range of motion and neck supple.   Lymphadenopathy:      Cervical: Cervical adenopathy present.      Right cervical: Superficial cervical adenopathy present.      Left cervical: Superficial cervical adenopathy present.   Skin:     General: Skin is warm and dry.   Neurological:      Mental Status: She is alert and oriented to person, place, and time.   Psychiatric:         Behavior: Behavior normal.         Thought Content: Thought content normal.         Judgment: Judgment normal.         Assessment:       1. Strep pharyngitis        Plan:       Pura was seen today for sore throat.    Diagnoses and all orders for this visit:    Strep pharyngitis    I believe that she may have had treatment failure with the amoxicillin.  We will treat with Zithromax.  -     azithromycin (ZITHROMAX) 500 MG tablet; Take 1 tablet (500 mg total) by mouth once daily. for 5 days    The patient does not have any wheezing today and will notify me if any asthma symptoms worsen.    I spent 30 minutes on this encounter, time includes  face-to-face, chart review, documentation, test review and orders.

## 2023-12-07 ENCOUNTER — PATIENT MESSAGE (OUTPATIENT)
Dept: FAMILY MEDICINE | Facility: CLINIC | Age: 43
End: 2023-12-07
Payer: COMMERCIAL

## 2024-01-08 RX ORDER — BUPROPION HYDROCHLORIDE 300 MG/1
300 TABLET ORAL DAILY
Qty: 90 TABLET | Refills: 3 | Status: SHIPPED | OUTPATIENT
Start: 2024-01-08

## 2024-01-08 NOTE — TELEPHONE ENCOUNTER
No care due was identified.  [facility] Embedded Care Due Messages. Reference number: [id number].   1/08/2024 5:08:37 AM CST

## 2024-01-08 NOTE — TELEPHONE ENCOUNTER
Refill Routing Note   Medication(s) are not appropriate for processing by [facility] for the following reason(s):        New or recently adjusted medication    [facility] action(s):  Defer               Appointments  past 12m or future 3m with PCP    Date Provider   Last Visit   1/3/2023 Berenice Garrido MD   Next Visit   Visit date not found Berenice Garrido MD   ED visits in past 90 days: 0        Note composed:7:37 AM 01/08/2024

## 2024-02-19 ENCOUNTER — OFFICE VISIT (OUTPATIENT)
Dept: FAMILY MEDICINE | Facility: CLINIC | Age: 44
End: 2024-02-19
Payer: COMMERCIAL

## 2024-02-19 VITALS
SYSTOLIC BLOOD PRESSURE: 104 MMHG | DIASTOLIC BLOOD PRESSURE: 62 MMHG | WEIGHT: 150.56 LBS | HEART RATE: 82 BPM | TEMPERATURE: 98 F | BODY MASS INDEX: 23.63 KG/M2 | OXYGEN SATURATION: 99 % | HEIGHT: 67 IN

## 2024-02-19 DIAGNOSIS — Z79.899 DRUG-INDUCED IMMUNODEFICIENCY: ICD-10-CM

## 2024-02-19 DIAGNOSIS — F31.9 BIPOLAR AFFECTIVE DISORDER, REMISSION STATUS UNSPECIFIED: ICD-10-CM

## 2024-02-19 DIAGNOSIS — D84.821 DRUG-INDUCED IMMUNODEFICIENCY: ICD-10-CM

## 2024-02-19 DIAGNOSIS — J06.9 UPPER RESPIRATORY TRACT INFECTION, UNSPECIFIED TYPE: Primary | ICD-10-CM

## 2024-02-19 LAB
CTP QC/QA: YES
CTP QC/QA: YES
POC MOLECULAR INFLUENZA A AGN: NEGATIVE
POC MOLECULAR INFLUENZA B AGN: NEGATIVE
SARS-COV-2 RDRP RESP QL NAA+PROBE: NEGATIVE

## 2024-02-19 PROCEDURE — 1160F RVW MEDS BY RX/DR IN RCRD: CPT | Mod: CPTII,S$GLB,, | Performed by: PHYSICIAN ASSISTANT

## 2024-02-19 PROCEDURE — 87635 SARS-COV-2 COVID-19 AMP PRB: CPT | Mod: QW,S$GLB,, | Performed by: PHYSICIAN ASSISTANT

## 2024-02-19 PROCEDURE — 3074F SYST BP LT 130 MM HG: CPT | Mod: CPTII,S$GLB,, | Performed by: PHYSICIAN ASSISTANT

## 2024-02-19 PROCEDURE — 99999 PR PBB SHADOW E&M-EST. PATIENT-LVL V: CPT | Mod: PBBFAC,,, | Performed by: PHYSICIAN ASSISTANT

## 2024-02-19 PROCEDURE — 87070 CULTURE OTHR SPECIMN AEROBIC: CPT | Performed by: PHYSICIAN ASSISTANT

## 2024-02-19 PROCEDURE — 3008F BODY MASS INDEX DOCD: CPT | Mod: CPTII,S$GLB,, | Performed by: PHYSICIAN ASSISTANT

## 2024-02-19 PROCEDURE — 1159F MED LIST DOCD IN RCRD: CPT | Mod: CPTII,S$GLB,, | Performed by: PHYSICIAN ASSISTANT

## 2024-02-19 PROCEDURE — 87147 CULTURE TYPE IMMUNOLOGIC: CPT | Performed by: PHYSICIAN ASSISTANT

## 2024-02-19 PROCEDURE — 87502 INFLUENZA DNA AMP PROBE: CPT | Mod: QW,S$GLB,, | Performed by: PHYSICIAN ASSISTANT

## 2024-02-19 PROCEDURE — 99214 OFFICE O/P EST MOD 30 MIN: CPT | Mod: S$GLB,,, | Performed by: PHYSICIAN ASSISTANT

## 2024-02-19 PROCEDURE — 3078F DIAST BP <80 MM HG: CPT | Mod: CPTII,S$GLB,, | Performed by: PHYSICIAN ASSISTANT

## 2024-02-19 RX ORDER — BENZONATATE 100 MG/1
100 CAPSULE ORAL 3 TIMES DAILY PRN
Qty: 30 CAPSULE | Refills: 0 | Status: SHIPPED | OUTPATIENT
Start: 2024-02-19 | End: 2024-03-01

## 2024-02-19 NOTE — PATIENT INSTRUCTIONS
A few reminders from today:    Cough drops as needed  Honey lemon tea  Hot steamy showers/warm compresses over sinuses  Rest, hydrate, eat healthy  Rotate tylenol and ibuprofen every 4 hours for discomfort  Take daily multivitamin, eat plenty of protein to help with strength and healing  Warm salt water gargles as needed for throat irritation  Tessalon as needed for cough  Throat culture today      Follow up with me if needed.   Please go to ER/urgent care if after hours or symptoms persist/worsen.     Do not hesitate to get in touch with me should you have any further questions.     Thank you for trusting me with your care!  I wish you health and happiness.    -Mary Zayas PA-C

## 2024-02-19 NOTE — PROGRESS NOTES
Subjective     Patient ID: Pura Gudino is a 44 y.o. female.    Chief Complaint: Sore Throat (Sore throat x 1 week; sinus pressure)        Pt is known to me, PCP Dr. Garrido.     Pt is a 44 year old female with MATT, BPD, RA, IBS, GERD, fibromyalgia. She presents today complaining of nasal congestion, cough, sneezing, sore throat x 6 days. No fever, chills. She has had body aches. She did have sick contact at work, as well as mom is feeling similar symptoms.     Review of Systems   Constitutional:  Positive for fatigue.   HENT:  Positive for nasal congestion, ear pain, mouth dryness, sinus pressure/congestion and sore throat. Negative for drooling, ear discharge and trouble swallowing.    Respiratory:  Positive for cough. Negative for shortness of breath and stridor.    Gastrointestinal:  Negative for abdominal pain, diarrhea, nausea and vomiting.   Musculoskeletal:  Positive for myalgias. Negative for neck pain.   Neurological:  Positive for headaches.          Objective     Physical Exam  Vitals and nursing note reviewed.   Constitutional:       General: She is not in acute distress.     Appearance: Normal appearance. She is not ill-appearing, toxic-appearing or diaphoretic.   HENT:      Head: Normocephalic and atraumatic.      Right Ear: Tympanic membrane, ear canal and external ear normal. There is no impacted cerumen.      Left Ear: Tympanic membrane, ear canal and external ear normal. There is no impacted cerumen.      Nose: No congestion or rhinorrhea.      Mouth/Throat:      Pharynx: Posterior oropharyngeal erythema (2 + tonsil enlargement) present. No oropharyngeal exudate.   Eyes:      General: No scleral icterus.        Right eye: No discharge.         Left eye: No discharge.      Conjunctiva/sclera: Conjunctivae normal.      Pupils: Pupils are equal, round, and reactive to light.   Cardiovascular:      Rate and Rhythm: Normal rate and regular rhythm.      Pulses: Normal pulses.      Heart sounds:  Normal heart sounds. No murmur heard.     No friction rub. No gallop.   Pulmonary:      Effort: Pulmonary effort is normal. No respiratory distress.      Breath sounds: Normal breath sounds. No stridor. No wheezing, rhonchi or rales.   Abdominal:      General: Abdomen is flat. Bowel sounds are normal.      Palpations: Abdomen is soft.   Musculoskeletal:         General: No deformity or signs of injury.      Cervical back: Normal range of motion and neck supple.      Right lower leg: No edema.      Left lower leg: No edema.   Lymphadenopathy:      Cervical: No cervical adenopathy.   Skin:     General: Skin is warm.      Coloration: Skin is not jaundiced or pale.      Findings: No lesion or rash.   Neurological:      General: No focal deficit present.      Mental Status: She is alert and oriented to person, place, and time. Mental status is at baseline.   Psychiatric:         Mood and Affect: Mood normal.         Behavior: Behavior normal.         Thought Content: Thought content normal.         Judgment: Judgment normal.       1. Upper respiratory tract infection, unspecified type  - POCT COVID-19 Rapid Screening  - POCT Influenza A/B Molecular  - benzonatate (TESSALON) 100 MG capsule; Take 1 capsule (100 mg total) by mouth 3 (three) times daily as needed for Cough.  Dispense: 30 capsule; Refill: 0  - CULTURE, RESPIRATORY  - THROAT    2. Drug-induced immunodeficiency  -followed by rheum    3. Bipolar affective disorder, remission status unspecified  -continue chronic meds    RTC/ER precautions given. F/U if symptoms persist or worsen/pending throat culture    Mary Zayas PA-C

## 2024-02-20 ENCOUNTER — TELEPHONE (OUTPATIENT)
Dept: FAMILY MEDICINE | Facility: CLINIC | Age: 44
End: 2024-02-20
Payer: COMMERCIAL

## 2024-02-20 DIAGNOSIS — A49.1 GROUP B STREPTOCOCCAL INFECTION: ICD-10-CM

## 2024-02-20 DIAGNOSIS — A49.1 GROUP B STREPTOCOCCAL INFECTION: Primary | ICD-10-CM

## 2024-02-20 RX ORDER — CEFDINIR 300 MG/1
300 CAPSULE ORAL 2 TIMES DAILY
Qty: 20 CAPSULE | Refills: 0 | Status: SHIPPED | OUTPATIENT
Start: 2024-02-20 | End: 2024-02-20 | Stop reason: SDUPTHER

## 2024-02-20 RX ORDER — CEFDINIR 300 MG/1
300 CAPSULE ORAL 2 TIMES DAILY
Qty: 20 CAPSULE | Refills: 0 | Status: SHIPPED | OUTPATIENT
Start: 2024-02-20 | End: 2024-03-01

## 2024-02-21 ENCOUNTER — PATIENT MESSAGE (OUTPATIENT)
Dept: FAMILY MEDICINE | Facility: CLINIC | Age: 44
End: 2024-02-21
Payer: COMMERCIAL

## 2024-02-21 LAB — BACTERIA THROAT CULT: ABNORMAL

## 2024-02-29 ENCOUNTER — OFFICE VISIT (OUTPATIENT)
Dept: FAMILY MEDICINE | Facility: CLINIC | Age: 44
End: 2024-02-29
Payer: COMMERCIAL

## 2024-02-29 VITALS — HEART RATE: 80 BPM | HEIGHT: 67 IN | OXYGEN SATURATION: 98 % | BODY MASS INDEX: 24.83 KG/M2 | WEIGHT: 158.19 LBS

## 2024-02-29 DIAGNOSIS — J45.909 ASTHMA, UNSPECIFIED ASTHMA SEVERITY, UNSPECIFIED WHETHER COMPLICATED, UNSPECIFIED WHETHER PERSISTENT: ICD-10-CM

## 2024-02-29 DIAGNOSIS — J02.0 STREP PHARYNGITIS: Primary | ICD-10-CM

## 2024-02-29 PROCEDURE — 99999 PR PBB SHADOW E&M-EST. PATIENT-LVL IV: CPT | Mod: PBBFAC,,, | Performed by: INTERNAL MEDICINE

## 2024-02-29 PROCEDURE — 1159F MED LIST DOCD IN RCRD: CPT | Mod: CPTII,S$GLB,, | Performed by: INTERNAL MEDICINE

## 2024-02-29 PROCEDURE — 99213 OFFICE O/P EST LOW 20 MIN: CPT | Mod: S$GLB,,, | Performed by: INTERNAL MEDICINE

## 2024-02-29 PROCEDURE — 3008F BODY MASS INDEX DOCD: CPT | Mod: CPTII,S$GLB,, | Performed by: INTERNAL MEDICINE

## 2024-02-29 RX ORDER — METHYLPREDNISOLONE 4 MG/1
TABLET ORAL
Qty: 21 EACH | Refills: 0 | Status: SHIPPED | OUTPATIENT
Start: 2024-02-29 | End: 2024-03-21

## 2024-02-29 RX ORDER — METHYLPREDNISOLONE 4 MG/1
TABLET ORAL
Qty: 21 EACH | Refills: 0 | Status: CANCELLED | OUTPATIENT
Start: 2024-02-29 | End: 2024-03-21

## 2024-02-29 NOTE — TELEPHONE ENCOUNTER
No care due was identified.  [facility] Embedded Care Due Messages. Reference number: [id number].   2/29/2024 1:43:48 PM CST

## 2024-02-29 NOTE — PROGRESS NOTES
Subjective     Patient ID: Pura Gudino is a 44 y.o. female.    Chief Complaint: Sinus Problem and Sore Throat    Pt here for sore throat. Finsihed cefdinir for strep but still with some pain.  No fever.      Sinus Problem  Associated symptoms include congestion, coughing, headaches, a hoarse voice, neck pain, shortness of breath, a sore throat and swollen glands. Pertinent negatives include no ear pain.   Sore Throat   This is a recurrent problem. The current episode started 1 to 4 weeks ago. The problem has been unchanged. Neither side of throat is experiencing more pain than the other. There has been no fever. The pain is at a severity of 4/10. The pain is mild. Associated symptoms include congestion, coughing, headaches, a hoarse voice, a plugged ear sensation, neck pain, shortness of breath and swollen glands. Pertinent negatives include no abdominal pain, diarrhea, drooling, ear discharge, ear pain, stridor, trouble swallowing or vomiting. She has had exposure to strep. She has had no exposure to mono. She has tried NSAIDs and gargles for the symptoms. The treatment provided moderate relief.     Review of Systems   HENT:  Positive for nasal congestion, hoarse voice and sore throat. Negative for drooling, ear discharge, ear pain and trouble swallowing.    Respiratory:  Positive for cough and shortness of breath. Negative for stridor.    Gastrointestinal:  Negative for abdominal pain, diarrhea and vomiting.   Musculoskeletal:  Positive for neck pain.   Neurological:  Positive for headaches.          Objective     Physical Exam  Constitutional:       Appearance: Normal appearance.   HENT:      Head: Normocephalic.   Cardiovascular:      Rate and Rhythm: Normal rate and regular rhythm.   Pulmonary:      Effort: Pulmonary effort is normal.      Breath sounds: Normal breath sounds.   Musculoskeletal:         General: Normal range of motion.      Cervical back: Normal range of motion.   Neurological:       General: No focal deficit present.      Mental Status: She is alert.   Psychiatric:         Mood and Affect: Mood normal.         Behavior: Behavior normal.            Assessment and Plan     1. Strep pharyngitis    Other orders  -     methylPREDNISolone (MEDROL DOSEPACK) 4 mg tablet; use as directed  Dispense: 21 each; Refill: 0        Update me next week- if no improvemetn will get her into ent         No follow-ups on file.

## 2024-03-01 RX ORDER — METHYLPREDNISOLONE 4 MG/1
TABLET ORAL
Qty: 21 EACH | Refills: 0 | OUTPATIENT
Start: 2024-03-01 | End: 2024-03-21

## 2024-03-03 ENCOUNTER — PATIENT MESSAGE (OUTPATIENT)
Dept: FAMILY MEDICINE | Facility: CLINIC | Age: 44
End: 2024-03-03
Payer: COMMERCIAL

## 2024-03-04 ENCOUNTER — PATIENT MESSAGE (OUTPATIENT)
Dept: RHEUMATOLOGY | Facility: CLINIC | Age: 44
End: 2024-03-04
Payer: COMMERCIAL

## 2024-03-04 RX ORDER — FLUCONAZOLE 150 MG/1
150 TABLET ORAL DAILY
Qty: 1 TABLET | Refills: 0 | Status: SHIPPED | OUTPATIENT
Start: 2024-03-04 | End: 2024-03-05

## 2024-03-07 ENCOUNTER — PATIENT MESSAGE (OUTPATIENT)
Dept: FAMILY MEDICINE | Facility: CLINIC | Age: 44
End: 2024-03-07
Payer: COMMERCIAL

## 2024-03-10 RX ORDER — FLUCONAZOLE 150 MG/1
150 TABLET ORAL DAILY
Qty: 3 TABLET | Refills: 0 | Status: SHIPPED | OUTPATIENT
Start: 2024-03-10 | End: 2024-03-13

## 2024-03-15 ENCOUNTER — PATIENT MESSAGE (OUTPATIENT)
Dept: GASTROENTEROLOGY | Facility: CLINIC | Age: 44
End: 2024-03-15
Payer: COMMERCIAL

## 2024-03-18 RX ORDER — TOPIRAMATE 25 MG/1
TABLET ORAL
Qty: 90 TABLET | Refills: 0 | Status: SHIPPED | OUTPATIENT
Start: 2024-03-18 | End: 2024-04-12 | Stop reason: SDUPTHER

## 2024-04-05 DIAGNOSIS — I10 ESSENTIAL HYPERTENSION: ICD-10-CM

## 2024-04-05 NOTE — TELEPHONE ENCOUNTER
No care due was identified.  [facility] Embedded Care Due Messages. Reference number: [id number].   4/05/2024 6:11:11 AM CDT

## 2024-04-06 RX ORDER — PROPRANOLOL HYDROCHLORIDE 80 MG/1
CAPSULE, EXTENDED RELEASE ORAL
Qty: 90 CAPSULE | Refills: 3 | Status: SHIPPED | OUTPATIENT
Start: 2024-04-06 | End: 2024-04-12 | Stop reason: SDUPTHER

## 2024-04-06 NOTE — TELEPHONE ENCOUNTER
Refill Decision Note   Pura Gudino  is requesting a refill authorization.  Brief Assessment and Rationale for Refill:  Approve     Medication Therapy Plan:        Comments:     Note composed:1:39 AM 04/06/2024

## 2024-04-12 DIAGNOSIS — I10 ESSENTIAL HYPERTENSION: ICD-10-CM

## 2024-04-12 RX ORDER — TOPIRAMATE 25 MG/1
TABLET ORAL
Qty: 90 TABLET | Refills: 0 | Status: SHIPPED | OUTPATIENT
Start: 2024-04-12 | End: 2024-05-30 | Stop reason: SDUPTHER

## 2024-04-12 RX ORDER — PROPRANOLOL HYDROCHLORIDE 80 MG/1
CAPSULE, EXTENDED RELEASE ORAL
Qty: 90 CAPSULE | Refills: 3 | Status: SHIPPED | OUTPATIENT
Start: 2024-04-12

## 2024-04-12 NOTE — TELEPHONE ENCOUNTER
No care due was identified.  [facility] Embedded Care Due Messages. Reference number: [id number].   4/12/2024 6:41:01 AM CDT

## 2024-05-07 RX ORDER — TOPIRAMATE 100 MG/1
TABLET, FILM COATED ORAL
Qty: 90 TABLET | Refills: 0 | Status: SHIPPED | OUTPATIENT
Start: 2024-05-07 | End: 2024-05-30 | Stop reason: SDUPTHER

## 2024-05-10 ENCOUNTER — OFFICE VISIT (OUTPATIENT)
Dept: FAMILY MEDICINE | Facility: CLINIC | Age: 44
End: 2024-05-10
Payer: COMMERCIAL

## 2024-05-10 VITALS
HEART RATE: 92 BPM | RESPIRATION RATE: 18 BRPM | SYSTOLIC BLOOD PRESSURE: 120 MMHG | HEIGHT: 67 IN | BODY MASS INDEX: 24.35 KG/M2 | WEIGHT: 155.13 LBS | DIASTOLIC BLOOD PRESSURE: 82 MMHG | OXYGEN SATURATION: 98 %

## 2024-05-10 DIAGNOSIS — H69.92 DYSFUNCTION OF LEFT EUSTACHIAN TUBE: ICD-10-CM

## 2024-05-10 DIAGNOSIS — B96.89 ACUTE BACTERIAL SINUSITIS: Primary | ICD-10-CM

## 2024-05-10 DIAGNOSIS — J01.90 ACUTE BACTERIAL SINUSITIS: Primary | ICD-10-CM

## 2024-05-10 PROCEDURE — 1159F MED LIST DOCD IN RCRD: CPT | Mod: CPTII,S$GLB,, | Performed by: FAMILY MEDICINE

## 2024-05-10 PROCEDURE — 3074F SYST BP LT 130 MM HG: CPT | Mod: CPTII,S$GLB,, | Performed by: FAMILY MEDICINE

## 2024-05-10 PROCEDURE — 3008F BODY MASS INDEX DOCD: CPT | Mod: CPTII,S$GLB,, | Performed by: FAMILY MEDICINE

## 2024-05-10 PROCEDURE — 1160F RVW MEDS BY RX/DR IN RCRD: CPT | Mod: CPTII,S$GLB,, | Performed by: FAMILY MEDICINE

## 2024-05-10 PROCEDURE — 3079F DIAST BP 80-89 MM HG: CPT | Mod: CPTII,S$GLB,, | Performed by: FAMILY MEDICINE

## 2024-05-10 PROCEDURE — 99999 PR PBB SHADOW E&M-EST. PATIENT-LVL IV: CPT | Mod: PBBFAC,,, | Performed by: FAMILY MEDICINE

## 2024-05-10 PROCEDURE — 99214 OFFICE O/P EST MOD 30 MIN: CPT | Mod: S$GLB,,, | Performed by: FAMILY MEDICINE

## 2024-05-10 RX ORDER — AMOXICILLIN 875 MG/1
875 TABLET, FILM COATED ORAL EVERY 12 HOURS
Qty: 20 TABLET | Refills: 0 | Status: SHIPPED | OUTPATIENT
Start: 2024-05-10

## 2024-05-10 NOTE — PROGRESS NOTES
"Subjective:       Patient ID: Pura Gudino is a 44 y.o. female.    Chief Complaint: Facial Pain    HPI:  Pleasant 44-year-old patient with a about a 3 history of intensifying upper respiratory tract symptoms.  She is now showing signs of bacterial sinus involvement with darkening mucus and some pain congestion.  She has also had some ear pain exam ears fine today.  She has showing no major systemic symptoms although she is very tired taking naps lately wheezes not usual her.  Do recommend a course of antibiotics and also a trial of Advil cold and Sinus congestion ear pain since you followed has an element of Eustachian tube dysfunction.  Review of Systems    Objective:      Vitals:    05/10/24 0954   BP: 120/82   Pulse: 92   Resp: 18   SpO2: 98%   Weight: 70.4 kg (155 lb 1.5 oz)   Height: 5' 7" (1.702 m)      Physical Exam    Results for orders placed or performed in visit on 02/19/24   CULTURE, RESPIRATORY  - THROAT    Specimen: Throat   Result Value Ref Range    RESPIRATORY CULTURE - THROAT (A)      STREPTOCOCCUS AGALACTIAE (GROUP B)  Many  Beta-hemolytic streptococci are routinely susceptible to   penicillins,cephalosporins and carbapenems.  Susceptibility testing not routinely performed  Normal respiratory shiraz also present     POCT COVID-19 Rapid Screening   Result Value Ref Range    POC Rapid COVID Negative Negative     Acceptable Yes    POCT Influenza A/B Molecular   Result Value Ref Range    POC Molecular Influenza A Ag Negative Negative, Not Reported    POC Molecular Influenza B Ag Negative Negative, Not Reported     Acceptable Yes       Assessment:       1. Acute bacterial sinusitis    2. Dysfunction of left eustachian tube        Plan:       Acute bacterial sinusitis  -     amoxicillin (AMOXIL) 875 MG tablet; Take 1 tablet (875 mg total) by mouth every 12 (twelve) hours.  Dispense: 20 tablet; Refill: 0    Dysfunction of left eustachian tube          Medication List " with Changes/Refills   New Medications    AMOXICILLIN (AMOXIL) 875 MG TABLET    Take 1 tablet (875 mg total) by mouth every 12 (twelve) hours.   Current Medications    ALBUTEROL (PROVENTIL/VENTOLIN HFA) 90 MCG/ACTUATION INHALER    Inhale 2 puffs into the lungs every 4 (four) hours as needed for Wheezing or Shortness of Breath. Rescue    BUPROPION (WELLBUTRIN XL) 300 MG 24 HR TABLET    Take 1 tablet (300 mg total) by mouth once daily.    CLONAZEPAM (KLONOPIN) 0.5 MG TABLET    Take 1 ½ tablets by mouth once a day as needed for anxiety    DUPIXENT  MG/2 ML PNIJ    Inject into the skin. Every 2 weeks    ETANERCEPT (ENBREL SURECLICK) 50 MG/ML (1 ML)    Inject 1 mL (50 mg total) into the skin every 7 days.    FLUTICASONE-SALMETEROL DISKUS INHALER 250-50 MCG    Inhale 1 puff into the lungs 2 (two) times daily. Controller    FREMANEZUMAB-VFRM (AJOVY AUTOINJECTOR) 225 MG/1.5 ML AUTOINJECTOR    Inject 1.5 mL (225 mg total) into the skin every 30 days.    NEBULIZER ACCESSORIES KIT    1 kit by Misc.(Non-Drug; Combo Route) route daily as needed.    PROPRANOLOL (INDERAL LA) 80 MG 24 HR CAPSULE    TAKE 1 CAPSULE(80 MG) BY MOUTH EVERY NIGHT    TOPIRAMATE (TOPAMAX) 100 MG TABLET    Take 1 tablet by mouth every night two hours before bed    TOPIRAMATE (TOPAMAX) 25 MG TABLET    Take 1 tablet (25 mg) by mouth every morning and keep 100 mg (separate Rx) at night    VENLAFAXINE (EFFEXOR-XR) 150 MG CP24    Take 1 capsule (150 mg total) by mouth once daily.   Discontinued Medications    BUPROPION (WELLBUTRIN XL) 300 MG 24 HR TABLET    Take 1 tablet (300 mg total) by mouth once daily.    BUPROPION (WELLBUTRIN XL) 300 MG 24 HR TABLET    Take 1 tablet (300 mg total) by mouth once daily.    CLONAZEPAM (KLONOPIN) 0.5 MG TABLET    Take 1 and 1/2 tablet by mouth once  a day as needed for anxiety 30 days    CLONAZEPAM (KLONOPIN) 0.5 MG TABLET    Take 1 tablet by mouth once a day as needed for anxiety for 30 days    LIDOCAINE HCL 2%  (XYLOCAINE) 2 % SOLN    Gargle, then spit 10mLs every 8 hours as needed for sore throat    MONTELUKAST (SINGULAIR) 10 MG TABLET    TAKE 1 TABLET(10 MG) BY MOUTH EVERY EVENING    UBROGEPANT (UBRELVY) 100 MG TABLET    Take 1 tablet by mouth at onset of headache. Take second tablet 2 hours later, if needed. No more than 2 tablets/day OR 3 days use per week.    VENLAFAXINE (EFFEXOR-XR) 150 MG CP24    Take 1 capsule (150 mg total) by mouth once daily.

## 2024-05-10 NOTE — PATIENT INSTRUCTIONS
So ask your pharmacist about the Advil cold and Sinus caplets not the gelcaps.  Get the name brand red box 40 count take 1 or 2 of those every 6 hours as needed for congestion and or ear discomfort.

## 2024-05-23 ENCOUNTER — PATIENT MESSAGE (OUTPATIENT)
Dept: FAMILY MEDICINE | Facility: CLINIC | Age: 44
End: 2024-05-23
Payer: COMMERCIAL

## 2024-05-30 ENCOUNTER — OFFICE VISIT (OUTPATIENT)
Dept: NEUROLOGY | Facility: CLINIC | Age: 44
End: 2024-05-30
Payer: COMMERCIAL

## 2024-05-30 DIAGNOSIS — Z71.1 PERSON WITH FEARED COMPLAINT IN WHOM NO DIAGNOSIS IS MADE: Primary | ICD-10-CM

## 2024-05-30 PROCEDURE — 99499 UNLISTED E&M SERVICE: CPT | Mod: 95,,, | Performed by: PHYSICIAN ASSISTANT

## 2024-05-30 RX ORDER — TOPIRAMATE 25 MG/1
TABLET ORAL
Qty: 30 TABLET | Refills: 0 | Status: SHIPPED | OUTPATIENT
Start: 2024-05-30 | End: 2024-05-31 | Stop reason: SDUPTHER

## 2024-05-30 RX ORDER — TOPIRAMATE 100 MG/1
TABLET, FILM COATED ORAL
Qty: 30 TABLET | Refills: 0 | Status: SHIPPED | OUTPATIENT
Start: 2024-05-30 | End: 2024-05-31 | Stop reason: SDUPTHER

## 2024-05-30 NOTE — PROGRESS NOTES
Neurology fast note:    She couldn't connect via video to today's virtual appt.     I gave courtesy refills of topamax 25 mg/100 mg.    Will need in person appt, staff, please help her reschedule.     ANNITAJ

## 2024-05-31 ENCOUNTER — OFFICE VISIT (OUTPATIENT)
Dept: NEUROLOGY | Facility: CLINIC | Age: 44
End: 2024-05-31
Payer: COMMERCIAL

## 2024-05-31 DIAGNOSIS — G43.019 INTRACTABLE MIGRAINE WITHOUT AURA AND WITHOUT STATUS MIGRAINOSUS: Primary | ICD-10-CM

## 2024-05-31 PROCEDURE — 1159F MED LIST DOCD IN RCRD: CPT | Mod: CPTII,95,, | Performed by: PHYSICIAN ASSISTANT

## 2024-05-31 PROCEDURE — 1160F RVW MEDS BY RX/DR IN RCRD: CPT | Mod: CPTII,95,, | Performed by: PHYSICIAN ASSISTANT

## 2024-05-31 PROCEDURE — 99213 OFFICE O/P EST LOW 20 MIN: CPT | Mod: 95,,, | Performed by: PHYSICIAN ASSISTANT

## 2024-05-31 RX ORDER — TOPIRAMATE 25 MG/1
TABLET ORAL
Qty: 30 TABLET | Refills: 6 | Status: SHIPPED | OUTPATIENT
Start: 2024-05-31

## 2024-05-31 RX ORDER — TOPIRAMATE 100 MG/1
TABLET, FILM COATED ORAL
Qty: 30 TABLET | Refills: 6 | Status: SHIPPED | OUTPATIENT
Start: 2024-05-31

## 2024-05-31 RX ORDER — FREMANEZUMAB-VFRM 225 MG/1.5ML
225 INJECTION SUBCUTANEOUS
Qty: 1.5 ML | Refills: 11 | Status: SHIPPED | OUTPATIENT
Start: 2024-05-31

## 2024-05-31 NOTE — PROGRESS NOTES
[facility]  [address] [place] [address]  Phone:[phone]  Fax: [phone]   Follow up visit -telemed    Provider Location: Work         Name of Location: [facility]  City: [place]   State: [place]  Medium to connect: Video  Patient Location: parked car   Name of Location:   parked car                                   City: [place]                                                               State: [place]                                         Consent for Electronic Treatment:   This visit was conducted with the use of an interactive audio and/or video telecommunications system that permits real-time communication between the patient and this provider. The patient has submitted their consent to be treated electronically by way of this telephone and/or video technology.  The risks and limitations of the process of telemedicine have been conveyed verbally during this encounter.Each patient to whom he or she provides medical services by telemedicine is:  (1) informed of the relationship between the physician and patient and the respective role of any other health care provider with respect to management of the patient; and (2) notified that he or she may decline to receive medical services by telemedicine and may withdraw from such care at any time.    Face to Face time with patient:   12 minutes of total time spent on the encounter, which includes face to face time and non-face to face time preparing to see the patient (eg, review of tests), Obtaining and/or reviewing separately obtained history, Documenting clinical information in the electronic or other health record, Independently interpreting results (not separately reported) and communicating results to the patient/family/caregiver, or Care coordination (not separately reported).         Patient Name: Pura Gudino  : 1980  MRN:  [id number]  Today: 2024  LOV:   "2023  chief complaint: Headache    PCP: Berenice Garrido MD.    Assessment:   Pura Gudino is a 44 y.o. right female  with a PMHx of: with fibromyalgia,  IBS -C, PCOS, RA, childhood seizures, GERD, anxiety , depression, asthma and migraines     whom presents w via telemed in follow up for HA.  KAPLAN appear to be chronic migrainous, however on current management, more manageable and "doing well" overall.     ICD-10-CM ICD-9-CM   1. Intractable migraine without aura and without status migrainosus  G43.019 346.11               Plan:   For HA Prevention:  1 continue ajovy 1 shot Q28 days , refills given   2  topamax 25 mg/100 mg, refills given   3 mood medicines per PCP        For HA :   ubrelvy   Limit OTC to <3 days use in week     To break up Headaches:  N/a       Other orders:  N/a       All test results as well as any necessary instructions were reviewed and discussed with patient.    Review:  Orders Placed This Encounter    fremanezumab-vfrm (AJOVY AUTOINJECTOR) 225 mg/1.5 mL autoinjector    topiramate (TOPAMAX) 100 MG tablet    topiramate (TOPAMAX) 25 MG tablet         No orders of the defined types were placed in this encounter.    No orders of the defined types were placed in this encounter.        Patient to return to PCP/other specialists for all other problems  Patient to continue on all medications as Rx'd  Full office note available online   RTO-6 months   The patient indicates understanding of these issues and agrees to the plan.    HPI:   Pura Gudino is a 44 y.o.right handed, female with a PMHx of: with fibromyalgia,  IBS -C, PCOS, RA, childhood seizures, GERD, anxiety , depression, asthma and migraines     whom presentsvia telemed in follow up for HA.     At last visit: ajovy 1 shot Q28 days, has topamax 25 mg/100 mg, and ubrelvy to abort HA.   PT for TMJ has helped   1-2 HD a month , aleve is abortive   Hasn't had to use ubrelvy   No side effects from ajovy or " "topamax  Doesn't want to make any changes          From previous visit:  continue ajovy, topamax, and ubrelvy.   Pain in the LEFT orbital/cheek region         -been to dentist/ENT-diagnosed with TMJ        -pain mostly in LEFT ramus of mandible, hard foods make it worse, stress causes her to clench          -historically did well with , was destroyed by dog, just got a new one and started wearing for a few weeks          -has tried zanaflex or flexeril  (both make her tired)                 -when asked about TN symptoms, some pain below the LEFT eye near the nose and radiation to facie on the LEFT (high cheek). Gentle touch, temperature, chewing doesn't make it worse.   When she has bad HA ubrelvy helps                           On topamax at 100 mg, memory better  Migraines are doing better overall (1 migraine in past month)    States no side effects from ajovy or ubrelvy  Mentions she has had some sinus and ear changes ("feels like fluid in the left ear, dripping into throat and in has fluid under skin in behind the left ear"). Has seen ENT and allergy (notes reviewed). Discomfort is NOT in concert with KAPLAN.   No other neurologic concerns.       From previous visit: continued ajovy, topamax taper and has ubrelvy.   Pain in LEFT cheek and under left eye   Ubrelvy was abortive  75 mg of topamax at present (was coming down d/t 200 mg BID causing cognitive changes, feels cognition is better, but more HA now)   Did have sinus infection last week contributing to HA  10 HD a month now, some with facial pain others migrainous (4-6 of the aforementioned migrainous).   Would like to go back up a little on topamax  Lost 2 days of work d/t TMJ  No HA at present  No side effects from ajovy            From previous visits: with Dr. Augustin (9/15/2022): continue ajovy, topamax 100 mg BID, inderal and effexor to reduce HA, ubrelvy to abort HA.   3-4 HD in past month  Pressure in cheeks and around eyes  Aleve made it go " "away   Had HA this morning  No side effects from ajovy  Ubrelvy for the one migraine she had this month   Feels brain fog is worse, would like to come off topamax   History of trauma (no), History of CNS infection (no), History of Stroke (no)  Severity: range: 1-7/10  Associated factors (bolded positive) WITH HA ( or migraine): Nausea, vomiting, photophobia, phonophobia, tinnitus, scalp pain, vision loss, diplopia, scintillations, eye pain, jaw pain, weakness?    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       From Dr. Augustin's notes (9/15/2022):  "  42Y RH F with fibromyalgia,  IBS -C, PCOS, RA, childhood seizures, GERD, anxiety , depression, asthma who presents for further evaluation of headache. She was initially seen on 6/22.   She mentions that she had headache x 3 more weeks after her initial visit. First month of Ajovy 225 mg she did not notice any improvement in headache. Second month some improvement, but after 3rd months she has really noticed a significant improvement. She has not had any side effects from Ajovy. She mentions that she " "has had 1 migraine attack in the last 4 weeks. She has had 3 mild headache in the last month that  tylenol resolved them and only 1 time did she need 1 migraine that needed eletriptan 40 mg which helped minimally and the attack continued. She has found that Ubrelvy 50 mg works much better than eletriptan 40 mg and only 1 dose of Ubrelvy 50 mg resolves attack and eletriptan 40 mg will need anther mediation like tylenol and the attacks lasts much longer.  She had a basal cell carcinoma on her left temple resected which caused a lot of pain and triggered the severe attacks for longer. This has resolved now. She is holding off TPX weaning off as she is concerned that she might worsen as she has been on TPX for so long.      Headache history initial visit on 6/10/22:  Age at onset and course over time: began having headache around 2009 typically they were everyday, she was very motion sensitive, car rides made her dizzy/nauseated in 2010 started on  mg bid and she had maybe 1 time every 6 months a migraine level headache. She would turn off all the lights, heavy pillow on head, pressure on face, would take clonazepam to get calm, they were lasting a few hours and would resolve. She was having "sinus headaches" which were 1 time every 3 months. They are mainly pressure in the cheek bones, mild pain, was able to be functional and no migraine symoptms resolved quickly.      She had COVID 19 in 1/22 had body aches, fever, sore throat, coughing, fatigue, headache which lasted for about 1 week, then she had the flu type A about a month after that. She reports that since then she has been having multiple sinus infections, lots of drainage, she has had some sinus headaches and will take Aleve 2-3 times a week for those sinus headache.  The sinus headaches she is having now are mainly frontal maxillary, they will stay mild, post nasal drip, if does not treat with Aleve would intensify and worsen her activities and feels " like eyebrows are being pushed. These last for about a few hours the aleve improves it, but will come back in the evening. They don't resolve all the day, but better.      Migraine level pain: every 1-2 weeks or so , starts with the feeling of heat in the left eye crescent shape around the left eye , starts faint and gets stronger that can last up to 2 days or so. She will also have the light/sound/smell sensitivity, nausea, the pain described as pulsing heat/sharp pain. She will then take the rizatriptan 10mg which   These attacks last about 1-2 days or so. Denies any side effects. Typically rizatriptan 10mg about 3-4 hours. She never does a second dose. Since the worsening she has not had any head imaging. She has been under more stress and having new job a school psychologist. She mentions that she is fostering and is 4 years old and visiting more with bio family and been stresses     She mentions that she would only had triggers to the side of the face like waxing/injury to that side. She mentions that when they first started did not start with heat crescent first and then from there.     Location: frontal, around left eye and then temporal and will become variable location  Character: tight band, pressure, heat, sharp, pulsing   Intensity:  1-9/10 , current 1/10   Frequency: 3 times a week milder attack, 1-2 days a week severe   Duration: milder attacks 1 day, migraine 1-2 days   Aura:  Denies   Associated symptoms: photophobia, phonophobia, osmophobia, kinesiophobia, neck tightness/pain, nausea/vomiting  Other neurologic symptoms: nasal congestion, neck tightness/pain, difficulty concentration, problems with task completion, dizziness, OS blurry vision  Precipitating factors:  Stress, physical trigger to the Left V1 distribution like injury or waxing of eye brow   Alleviating factors:  darkness,  local pressure, medications  Aggravating factors: stress  ER/UC visits: 0   Caffeine:  2 cups coffee/work days  "  Sleep: reports good   GYN: s/p hysterectomy "    Medication Reconciliation:   Current Outpatient Medications   Medication Sig Dispense Refill    albuterol (PROVENTIL/VENTOLIN HFA) 90 mcg/actuation inhaler Inhale 2 puffs into the lungs every 4 (four) hours as needed for Wheezing or Shortness of Breath. Rescue 8 g 2    amoxicillin (AMOXIL) 875 MG tablet Take 1 tablet (875 mg total) by mouth every 12 (twelve) hours. 20 tablet 0    buPROPion (WELLBUTRIN XL) 300 MG 24 hr tablet Take 1 tablet (300 mg total) by mouth once daily. 90 tablet 3    clonazePAM (KLONOPIN) 0.5 MG tablet Take 1 ½ tablets by mouth once a day as needed for anxiety 45 tablet 1    DUPIXENT  mg/2 mL PnIj Inject into the skin. Every 2 weeks      etanercept (ENBREL SURECLICK) 50 mg/mL (1 mL) Inject 1 mL (50 mg total) into the skin every 7 days. 12 mL 1    fluticasone-salmeterol diskus inhaler 250-50 mcg Inhale 1 puff into the lungs 2 (two) times daily. Controller 60 each 11    fremanezumab-vfrm (AJOVY AUTOINJECTOR) 225 mg/1.5 mL autoinjector Inject 1.5 mL (225 mg total) into the skin every 30 days. 1.5 mL 11    nebulizer accessories Kit 1 kit by Misc.(Non-Drug; Combo Route) route daily as needed. 1 kit 0    propranoloL (INDERAL LA) 80 MG 24 hr capsule TAKE 1 CAPSULE(80 MG) BY MOUTH EVERY NIGHT 90 capsule 3    topiramate (TOPAMAX) 100 MG tablet Take 1 tablet by mouth every night two hours before bed 30 tablet 0    topiramate (TOPAMAX) 25 MG tablet Take 1 tablet (25 mg) by mouth every morning and keep 100 mg (separate Rx) at night 30 tablet 0    venlafaxine (EFFEXOR-XR) 150 MG Cp24 Take 1 capsule (150 mg total) by mouth once daily. 30 capsule 2     No current facility-administered medications for this visit.     Review of patient's allergies indicates:   Allergen Reactions    Pertussis vaccines     Decongestant allergy Palpitations       PMHx:  Past Medical History:   Diagnosis Date    Allergy     Anemia     Asthma     Chronic diarrhea     Elevated " C-reactive protein (CRP)     Fibromyalgia     GERD (gastroesophageal reflux disease)     Headache(784.0)     Irritable bowel syndrome     Mental disorder     Bipolar Disorder, Anxiety, Depression    Motion sickness     Motion sickness     PCOS (polycystic ovarian syndrome)     Rheumatoid arthritis     Seizures     childhood     Past Surgical History:   Procedure Laterality Date    COLONOSCOPY N/A 12/21/2022    Procedure: COLONOSCOPY;  Surgeon: Mikala Pakrer MD;  Location: [facility];  Service: Endoscopy;  Laterality: N/A;    HYSTERECTOMY      LASER LAPAROSCOPY      left hip surgery had hardware replaced then removed      SINUS SURGERY         Fhx:  Family History   Problem Relation Name Age of Onset    Rheum arthritis Maternal Grandmother      Bipolar disorder Other Great uncle     Cataracts Mother      Cataracts Maternal Grandfather      Asthma Maternal Aunt      Alcohol abuse Maternal Aunt      Depression Maternal Aunt      Diabetes Mellitus Maternal Aunt      Heart disease Maternal Aunt      Alcohol abuse Maternal Uncle      Bipolar disorder Maternal Uncle      Heart failure Maternal Uncle      Breast cancer Neg Hx      Ovarian cancer Neg Hx      Migraines Neg Hx         Shx:   Social History     Socioeconomic History    Marital status:     Number of children: 0    Years of education: 19   Occupational History    Occupation: psychologist   Tobacco Use    Smoking status: Never    Smokeless tobacco: Never   Substance and Sexual Activity    Alcohol use: No    Drug use: No    Sexual activity: Yes     Birth control/protection: OCP     Social Determinants of Health     Financial Resource Strain: Patient Declined (11/24/2023)    Overall Financial Resource Strain (CARDIA)     Difficulty of Paying Living Expenses: Patient declined   Food Insecurity: Patient Declined (11/24/2023)    Hunger Vital Sign     Worried About Running Out of Food in the Last Year: Patient declined     Ran Out of Food in the Last Year:  Patient declined   Transportation Needs: Patient Declined (2023)    PRAPARE - Transportation     Lack of Transportation (Medical): Patient declined     Lack of Transportation (Non-Medical): Patient declined   Physical Activity: Unknown (2023)    Exercise Vital Sign     Days of Exercise per Week: Patient declined   Stress: Patient Declined (2023)    [employer] - Occupational Stress Questionnaire     Feeling of Stress : Patient declined   Housing Stability: Unknown (2023)    Housing Stability Vital Sign     Unable to Pay for Housing in the Last Year: Patient refused     Unstable Housing in the Last Year: Patient refused           Labs:   Reviewed in chart     Imagin/3/2022 MRI brain (report): 1.  Normal imaging of the brain.  There is no acute abnormality.  There is no hemorrhage, mass/mass effect, acute infarction.  There is no pathologic enhancement.         Other testing:  Reviewed in chart     Note: I have independently reviewed any/all imaging/labs/tests and agree with the report (s) as documented.  Any discrepancies will be as noted/demarcated by free text.  ADAN LEAL 2024                     ROS:   Review Of Systems (questions asked, positive or additions in BOLD)  Gen: Weight change, fatigue/malaise, pyrexia   HEENT: Tinnitus, headache,  blurred vision, eye pain, diplopia, photophobia,  nose bleeds, congestion, sore throat, jaw pain, scalp pain, neck stiffness   Card: Palpitations, CP   Pulm: SOB, cough   Vas: Easy bruising, easy bleeding   GI: N/V/D/C, incontinence, hematemesis, hematochezia    : incontinence, hematuria        M/S:   falls    Neuro: PER HPI   PSY: Memory loss, confusion, depression, anxiety, trouble in sleep          EXAM:   LMP 2018   <---none taken as this was a virtual visit   GEN:  NAD  HEENT: NC/AT      NEURO:  Mental Status:  Awake, alert and appropriately oriented to time, place, and person.  Normal attention and  concentration.  Speech is fluent and appropriate language function (I.e., comprehension).       Cranial Nerves:   Extraocular movements are intact and without nystagmus.  Hearing appears normal.  FROM of neck in all (6) directions, shoulder shrug symmetrical. Tongue in midline without fasiculation.                Motor:  antigravity bilat UE     No resting tremor             Gait and Stance: Not tested     This document has been electronically signed by Mr. Sb GASTELUMDemarco Fisher MPA, PA-C on 5/31/2024, I have personally typed this message using the EMR.       Dr Deep MD  was available during today's visit.          CC: Berenice Garrido MD

## 2024-07-23 DIAGNOSIS — M06.00 SERONEGATIVE RHEUMATOID ARTHRITIS: ICD-10-CM

## 2024-07-23 DIAGNOSIS — D84.821 DRUG-INDUCED IMMUNODEFICIENCY: ICD-10-CM

## 2024-07-23 DIAGNOSIS — Z79.899 DRUG-INDUCED IMMUNODEFICIENCY: ICD-10-CM

## 2024-07-24 RX ORDER — ETANERCEPT 50 MG/ML
50 SOLUTION SUBCUTANEOUS
Qty: 12 ML | Refills: 1 | Status: ACTIVE | OUTPATIENT
Start: 2024-07-24 | End: 2024-10-22

## 2024-07-24 NOTE — TELEPHONE ENCOUNTER
Pharmacy requesting refill on Enbrel Sureclick 50 mg/ml  Pt's LOV 11/9/23  Pt's NOV no appt.  Medication pending

## 2024-07-26 NOTE — TELEPHONE ENCOUNTER
No care due was identified.  [facility] Embedded Care Due Messages. Reference number: [id number].   7/26/2024 2:07:54 PM CDT

## 2024-07-28 RX ORDER — CLOBETASOL PROPIONATE 0.5 MG/G
OINTMENT TOPICAL
Qty: 60 G | Refills: 0 | Status: SHIPPED | OUTPATIENT
Start: 2024-07-28

## 2024-08-07 ENCOUNTER — OFFICE VISIT (OUTPATIENT)
Dept: FAMILY MEDICINE | Facility: CLINIC | Age: 44
End: 2024-08-07
Payer: COMMERCIAL

## 2024-08-07 VITALS
OXYGEN SATURATION: 99 % | HEIGHT: 67 IN | SYSTOLIC BLOOD PRESSURE: 112 MMHG | BODY MASS INDEX: 23.91 KG/M2 | DIASTOLIC BLOOD PRESSURE: 74 MMHG | WEIGHT: 152.31 LBS | HEART RATE: 82 BPM

## 2024-08-07 DIAGNOSIS — R10.30 LOWER ABDOMINAL PAIN: Primary | ICD-10-CM

## 2024-08-07 LAB
BILIRUBIN, UA POC OHS: NEGATIVE
BLOOD, UA POC OHS: NEGATIVE
CLARITY, UA POC OHS: CLEAR
COLOR, UA POC OHS: YELLOW
GLUCOSE, UA POC OHS: NEGATIVE
KETONES, UA POC OHS: NEGATIVE
LEUKOCYTES, UA POC OHS: NEGATIVE
NITRITE, UA POC OHS: NEGATIVE
PH, UA POC OHS: 7
PROTEIN, UA POC OHS: NEGATIVE
SPECIFIC GRAVITY, UA POC OHS: 1.02
UROBILINOGEN, UA POC OHS: 0.2

## 2024-08-07 PROCEDURE — 81003 URINALYSIS AUTO W/O SCOPE: CPT | Mod: QW,S$GLB,, | Performed by: INTERNAL MEDICINE

## 2024-08-07 PROCEDURE — 1159F MED LIST DOCD IN RCRD: CPT | Mod: CPTII,S$GLB,, | Performed by: INTERNAL MEDICINE

## 2024-08-07 PROCEDURE — 3008F BODY MASS INDEX DOCD: CPT | Mod: CPTII,S$GLB,, | Performed by: INTERNAL MEDICINE

## 2024-08-07 PROCEDURE — 99999 PR PBB SHADOW E&M-EST. PATIENT-LVL IV: CPT | Mod: PBBFAC,,, | Performed by: INTERNAL MEDICINE

## 2024-08-07 PROCEDURE — 3074F SYST BP LT 130 MM HG: CPT | Mod: CPTII,S$GLB,, | Performed by: INTERNAL MEDICINE

## 2024-08-07 PROCEDURE — 3078F DIAST BP <80 MM HG: CPT | Mod: CPTII,S$GLB,, | Performed by: INTERNAL MEDICINE

## 2024-08-07 PROCEDURE — 99213 OFFICE O/P EST LOW 20 MIN: CPT | Mod: S$GLB,,, | Performed by: INTERNAL MEDICINE

## 2024-08-07 RX ORDER — FLUCONAZOLE 150 MG/1
150 TABLET ORAL DAILY
Qty: 3 TABLET | Refills: 0 | Status: SHIPPED | OUTPATIENT
Start: 2024-08-07 | End: 2024-08-10

## 2024-08-07 RX ORDER — DICYCLOMINE HYDROCHLORIDE 10 MG/1
10 CAPSULE ORAL
Qty: 40 CAPSULE | Refills: 0 | Status: SHIPPED | OUTPATIENT
Start: 2024-08-07 | End: 2024-09-06

## 2024-08-28 ENCOUNTER — HOSPITAL ENCOUNTER (OUTPATIENT)
Dept: RADIOLOGY | Facility: HOSPITAL | Age: 44
Discharge: HOME | End: 2024-08-28
Attending: STUDENT IN AN ORGANIZED HEALTH CARE EDUCATION/TRAINING PROGRAM
Payer: COMMERCIAL

## 2024-08-28 ENCOUNTER — OFFICE VISIT (OUTPATIENT)
Dept: PAIN MEDICINE | Facility: CLINIC | Age: 44
End: 2024-08-28
Payer: COMMERCIAL

## 2024-08-28 VITALS — BODY MASS INDEX: 23.86 KG/M2 | HEIGHT: 67 IN | WEIGHT: 152 LBS

## 2024-08-28 DIAGNOSIS — M54.2 NECK PAIN: ICD-10-CM

## 2024-08-28 DIAGNOSIS — G89.29 CHRONIC PAIN OF BOTH SHOULDERS: ICD-10-CM

## 2024-08-28 DIAGNOSIS — M25.511 CHRONIC PAIN OF BOTH SHOULDERS: ICD-10-CM

## 2024-08-28 DIAGNOSIS — M25.512 CHRONIC PAIN OF BOTH SHOULDERS: ICD-10-CM

## 2024-08-28 DIAGNOSIS — M54.2 NECK PAIN: Primary | ICD-10-CM

## 2024-08-28 PROCEDURE — 73030 X-RAY EXAM OF SHOULDER: CPT | Mod: TC,50,PO

## 2024-08-28 PROCEDURE — 3008F BODY MASS INDEX DOCD: CPT | Mod: CPTII,S$GLB,, | Performed by: STUDENT IN AN ORGANIZED HEALTH CARE EDUCATION/TRAINING PROGRAM

## 2024-08-28 PROCEDURE — 1159F MED LIST DOCD IN RCRD: CPT | Mod: CPTII,S$GLB,, | Performed by: STUDENT IN AN ORGANIZED HEALTH CARE EDUCATION/TRAINING PROGRAM

## 2024-08-28 PROCEDURE — 1160F RVW MEDS BY RX/DR IN RCRD: CPT | Mod: CPTII,S$GLB,, | Performed by: STUDENT IN AN ORGANIZED HEALTH CARE EDUCATION/TRAINING PROGRAM

## 2024-08-28 PROCEDURE — 73030 X-RAY EXAM OF SHOULDER: CPT | Mod: 26,,, | Performed by: RADIOLOGY

## 2024-08-28 PROCEDURE — 99204 OFFICE O/P NEW MOD 45 MIN: CPT | Mod: S$GLB,,, | Performed by: STUDENT IN AN ORGANIZED HEALTH CARE EDUCATION/TRAINING PROGRAM

## 2024-08-28 PROCEDURE — 72040 X-RAY EXAM NECK SPINE 2-3 VW: CPT | Mod: 26,,, | Performed by: RADIOLOGY

## 2024-08-28 PROCEDURE — 99999 PR PBB SHADOW E&M-EST. PATIENT-LVL IV: CPT | Mod: PBBFAC,,, | Performed by: STUDENT IN AN ORGANIZED HEALTH CARE EDUCATION/TRAINING PROGRAM

## 2024-08-28 PROCEDURE — 72040 X-RAY EXAM NECK SPINE 2-3 VW: CPT | Mod: TC,PO

## 2024-08-28 NOTE — PROGRESS NOTES
[place] - Department    Berenice Garrido MD      First Office Visit: 8/28/2024   Today' Date: 8/28/2024  Last Office Visit: None    Chief complaint: neck pain    HPI: Pt is a pleasant 44 y.o., who presents for evaluation. Referred by self. Pt complains of neck pain and bilateral shoulder pain since 2010. Endorses pain starting in the neck going down to both shoulders. Of note, pt states she does have known L rotator cuff tear. Denies having sharp, shooting pain going down both arms. MR C-spine from 5 yrs ago does show L foraminal stenosis at C5-6. Denies headaches, balance issues, no problems dropping objects. No BB changes. Is doing HEP.         Pain disability index score: 63  Pain score: 3    Relevant Imaging/ Testing:   XR L-spine 10/16  XR C-spine 10/16  MR C-spine 9/16  MR L-spine 9/16    Procedures: None    Date of board of pharmacy review:8/28/2024  Date of opioid risk screening/ pain psych: None  Date of opioid agreement and consent: None  Date of urine drug screen: None  Date of random pill count: None     was reviewed today: reviewed, clonazepam use    Prescribed medications: None    See EHR for  PMH, PSH, FH, SH, Medications and Allergy    ROS:  Positive for pain  ROS     PE:  There were no vitals filed for this visit.  General: Pleasant, no distress  HEENT: NC/ AT. PERRLA  CV: Radial pulses intact  Pulm: No distress  Ext: No edema    Physical Exam     Neuromusculoskeletal:  Head: NC, AT. PERRLA. No occipital tenderness  Neck: Intact range of motions, extension, flexion, rotation. Bilat Facet loading. Neg Spurling. Min Tenderness.  5/5 Strength, normal tone. Neg Brady's  Shoulder: Intact range of motion. Min Bicep groove tenderness. 5/5 Strength  Lumbar: Intact range of motion  Hip: Intact range of motion  SI: Level  Knee: Intact range of motion  Reflexes: normal Bicep  Strength: 5/5 globally   Sensory: decreased sensation of R arm   Skin: No bruising, erythema  Gait:  Normal      Impression:  Neck pain  Bilateral shoulder pain  Relevant History  BMI 23.81  Bipolar  Anxiety  Fibromyalgia     Assessment:  Cervical radiculopathy       Plan:  Discussed options  Imaging/ relevant records viewed/ reviewed/ discussed  Imaging results viewed and reviewed (noted above)/ reviewed with patient   reviewed  Cont HEP  XR B shoulders  XR C-spine  Re-eval after  MR C-spine if pain persists  Consider ILESI C7-T1  Consider MR shoulders         Prescribed medications:  1. None    The impression and plan were discussed and explained in detail. All the questions were answered. Education was provided accordingly.         Follow-up:  6 wks or sooner if needed    Chloe Huerta MD

## 2024-08-29 ENCOUNTER — PATIENT MESSAGE (OUTPATIENT)
Dept: PAIN MEDICINE | Facility: CLINIC | Age: 44
End: 2024-08-29
Payer: COMMERCIAL

## 2024-08-29 DIAGNOSIS — Z12.31 OTHER SCREENING MAMMOGRAM: ICD-10-CM

## 2024-08-29 DIAGNOSIS — G62.9 NEUROPATHY: Primary | ICD-10-CM

## 2024-09-04 ENCOUNTER — TELEPHONE (OUTPATIENT)
Dept: NEUROLOGY | Facility: CLINIC | Age: 44
End: 2024-09-04
Payer: COMMERCIAL

## 2024-09-04 NOTE — TELEPHONE ENCOUNTER
LVM informing patient of time and date of EMG study in [place] office.  She can call back for changes if needed.

## 2024-09-17 ENCOUNTER — OFFICE VISIT (OUTPATIENT)
Dept: FAMILY MEDICINE | Facility: CLINIC | Age: 44
End: 2024-09-17
Payer: COMMERCIAL

## 2024-09-17 VITALS
BODY MASS INDEX: 24.04 KG/M2 | SYSTOLIC BLOOD PRESSURE: 100 MMHG | OXYGEN SATURATION: 96 % | DIASTOLIC BLOOD PRESSURE: 64 MMHG | WEIGHT: 158.63 LBS | HEIGHT: 68 IN | HEART RATE: 74 BPM

## 2024-09-17 DIAGNOSIS — M25.519 SHOULDER PAIN, UNSPECIFIED CHRONICITY, UNSPECIFIED LATERALITY: Primary | ICD-10-CM

## 2024-09-17 DIAGNOSIS — J32.9 SINUSITIS, UNSPECIFIED CHRONICITY, UNSPECIFIED LOCATION: ICD-10-CM

## 2024-09-17 DIAGNOSIS — Z00.00 ANNUAL PHYSICAL EXAM: ICD-10-CM

## 2024-09-17 PROCEDURE — 99999 PR PBB SHADOW E&M-EST. PATIENT-LVL IV: CPT | Mod: PBBFAC,,, | Performed by: INTERNAL MEDICINE

## 2024-09-17 PROCEDURE — 3074F SYST BP LT 130 MM HG: CPT | Mod: CPTII,S$GLB,, | Performed by: INTERNAL MEDICINE

## 2024-09-17 PROCEDURE — 1159F MED LIST DOCD IN RCRD: CPT | Mod: CPTII,S$GLB,, | Performed by: INTERNAL MEDICINE

## 2024-09-17 PROCEDURE — 99214 OFFICE O/P EST MOD 30 MIN: CPT | Mod: S$GLB,,, | Performed by: INTERNAL MEDICINE

## 2024-09-17 PROCEDURE — 3078F DIAST BP <80 MM HG: CPT | Mod: CPTII,S$GLB,, | Performed by: INTERNAL MEDICINE

## 2024-09-17 PROCEDURE — 3008F BODY MASS INDEX DOCD: CPT | Mod: CPTII,S$GLB,, | Performed by: INTERNAL MEDICINE

## 2024-09-17 RX ORDER — AZITHROMYCIN 250 MG/1
TABLET, FILM COATED ORAL
Qty: 6 TABLET | Refills: 0 | Status: SHIPPED | OUTPATIENT
Start: 2024-09-17 | End: 2024-09-22

## 2024-09-17 RX ORDER — FLUCONAZOLE 150 MG/1
150 TABLET ORAL DAILY
Qty: 1 TABLET | Refills: 0 | Status: SHIPPED | OUTPATIENT
Start: 2024-09-17 | End: 2024-09-18

## 2024-09-17 RX ORDER — TIZANIDINE 4 MG/1
4 TABLET ORAL EVERY 8 HOURS
Qty: 30 TABLET | Refills: 0 | Status: SHIPPED | OUTPATIENT
Start: 2024-09-17 | End: 2024-09-27

## 2024-09-17 NOTE — PROGRESS NOTES
Answers submitted by the patient for this visit:  Sore Throat Questionnaire (Submitted on 9/17/2024)  Chief Complaint: Sore throat  Chronicity: recurrent  Onset: 1 to 4 weeks ago  Progression since onset: unchanged  Pain worse on: right  Fever: no fever  Pain - numeric: 4/10  abdominal pain: No  congestion: Yes  cough: Yes  diarrhea: No  drooling: No  ear discharge: No  ear pain: Yes  headaches: Yes  hoarse voice: Yes  neck pain: Yes  plugged ear sensation: Yes  stridor: No  shortness of breath: Yes  swollen glands: No  trouble swallowing: No  vomiting: No  Treatments tried: NSAIDs, cool liquids, gargles  Improvement on treatment: mild  Pain severity: mild

## 2024-09-17 NOTE — PROGRESS NOTES
Subjective     Patient ID: Pura Gudino is a 44 y.o. female.    Chief Complaint: Cough, Sinus Problem, and Otalgia    Here for a few reasons  1 sinus pain, cough, sore throat, ear tenderness for two weeks. Coughing up green sputum  2. Shoulder pain- was hue garcia on [facility] wants someone over ehere    Cough  Associated symptoms include ear pain, headaches, a sore throat and shortness of breath.   Sinus Problem  Associated symptoms include congestion, coughing, ear pain, headaches, a hoarse voice, neck pain, shortness of breath and a sore throat. Pertinent negatives include no swollen glands.   Otalgia   Associated symptoms include coughing, headaches, neck pain and a sore throat. Pertinent negatives include no abdominal pain, diarrhea, ear discharge or vomiting.   Sore Throat   This is a recurrent problem. The current episode started 1 to 4 weeks ago. The problem has been unchanged. The pain is worse on the right side. There has been no fever. The pain is at a severity of 4/10. The pain is mild. Associated symptoms include congestion, coughing, ear pain, headaches, a hoarse voice, a plugged ear sensation, neck pain and shortness of breath. Pertinent negatives include no abdominal pain, diarrhea, drooling, ear discharge, stridor, swollen glands, trouble swallowing or vomiting. She has tried NSAIDs, cool liquids and gargles for the symptoms. The treatment provided mild relief.     Review of Systems   HENT:  Positive for nasal congestion, ear pain, hoarse voice and sore throat. Negative for drooling, ear discharge and trouble swallowing.    Respiratory:  Positive for cough and shortness of breath. Negative for stridor.    Gastrointestinal:  Negative for abdominal pain, diarrhea and vomiting.   Musculoskeletal:  Positive for neck pain.   Neurological:  Positive for headaches.          Objective     Physical Exam  Constitutional:       Appearance: Normal appearance.   HENT:      Head: Normocephalic.    Cardiovascular:      Rate and Rhythm: Normal rate and regular rhythm.   Pulmonary:      Effort: Pulmonary effort is normal.      Breath sounds: Normal breath sounds.   Musculoskeletal:         General: Normal range of motion.      Cervical back: Normal range of motion.   Neurological:      General: No focal deficit present.      Mental Status: She is alert.   Psychiatric:         Mood and Affect: Mood normal.         Behavior: Behavior normal.            Assessment and Plan     1. Shoulder pain, unspecified chronicity, unspecified laterality  -     Ambulatory referral/consult to Orthopedics; Future; Expected date: 09/24/2024    2. Sinusitis, unspecified chronicity, unspecified location    3. Annual physical exam  -     CBC Auto Differential; Future; Expected date: 09/17/2024  -     Lipid Panel; Future; Expected date: 09/17/2024  -     Comprehensive Metabolic Panel; Future; Expected date: 09/17/2024  -     TSH; Future; Expected date: 09/17/2024    Other orders  -     tiZANidine (ZANAFLEX) 4 MG tablet; Take 1 tablet (4 mg total) by mouth every 8 (eight) hours. for 10 days  Dispense: 30 tablet; Refill: 0  -     azithromycin (Z-AKIKO) 250 MG tablet; Take 2 tablets by mouth on day 1; Take 1 tablet by mouth on days 2-5  Dispense: 6 tablet; Refill: 0  -     fluconazole (DIFLUCAN) 150 MG Tab; Take 1 tablet (150 mg total) by mouth once daily. for 1 day  Dispense: 1 tablet; Refill: 0                 No follow-ups on file.

## 2024-09-20 RX ORDER — CLOBETASOL PROPIONATE 0.5 MG/G
OINTMENT TOPICAL
Qty: 60 G | Refills: 0 | Status: CANCELLED | OUTPATIENT
Start: 2024-09-20

## 2024-09-20 NOTE — TELEPHONE ENCOUNTER
Refill Routing Note   Medication(s) are not appropriate for processing by [facility] for the following reason(s):        New or recently adjusted medication:     [facility] action(s):  Defer      Medication Therapy Plan:         Appointments  past 12m or future 3m with PCP    Date Provider   Last Visit   9/17/2024 Beernice Garrido MD   Next Visit   Visit date not found Berenice Garrido MD   ED visits in past 90 days: 0        Note composed:10:16 AM 09/20/2024

## 2024-09-20 NOTE — TELEPHONE ENCOUNTER
No care due was identified.  [facility] Embedded Care Due Messages. Reference number: [id number].   9/20/2024 3:51:59 PM CDT

## 2024-09-20 NOTE — TELEPHONE ENCOUNTER
No care due was identified.  [facility] Embedded Care Due Messages. Reference number: [id number].   9/20/2024 5:08:24 AM CDT

## 2024-09-21 LAB
ALBUMIN SERPL-MCNC: 4.1 G/DL (ref 3.6–5.1)
ALBUMIN/GLOB SERPL: 1.5 (CALC) (ref 1–2.5)
ALP SERPL-CCNC: 85 U/L (ref 31–125)
ALT SERPL-CCNC: 9 U/L (ref 6–29)
AST SERPL-CCNC: 13 U/L (ref 10–30)
BASOPHILS # BLD AUTO: 83 CELLS/UL (ref 0–200)
BASOPHILS NFR BLD AUTO: 0.9 %
BILIRUB SERPL-MCNC: 0.8 MG/DL (ref 0.2–1.2)
BUN SERPL-MCNC: 14 MG/DL (ref 7–25)
BUN/CREAT SERPL: NORMAL (CALC) (ref 6–22)
CALCIUM SERPL-MCNC: 9.3 MG/DL (ref 8.6–10.2)
CHLORIDE SERPL-SCNC: 108 MMOL/L (ref 98–110)
CHOLEST SERPL-MCNC: 179 MG/DL
CHOLEST/HDLC SERPL: 3.6 (CALC)
CO2 SERPL-SCNC: 26 MMOL/L (ref 20–32)
CREAT SERPL-MCNC: 0.76 MG/DL (ref 0.5–0.99)
EGFR: 99 ML/MIN/1.73M2
EOSINOPHIL # BLD AUTO: 101 CELLS/UL (ref 15–500)
EOSINOPHIL NFR BLD AUTO: 1.1 %
ERYTHROCYTE [DISTWIDTH] IN BLOOD BY AUTOMATED COUNT: 14.1 % (ref 11–15)
GLOBULIN SER CALC-MCNC: 2.8 G/DL (CALC) (ref 1.9–3.7)
GLUCOSE SERPL-MCNC: 80 MG/DL (ref 65–99)
HCT VFR BLD AUTO: 39.8 % (ref 35–45)
HDLC SERPL-MCNC: 50 MG/DL
HGB BLD-MCNC: 12.4 G/DL (ref 11.7–15.5)
LDLC SERPL CALC-MCNC: 111 MG/DL (CALC)
LYMPHOCYTES # BLD AUTO: 2208 CELLS/UL (ref 850–3900)
LYMPHOCYTES NFR BLD AUTO: 24 %
MCH RBC QN AUTO: 27.2 PG (ref 27–33)
MCHC RBC AUTO-ENTMCNC: 31.2 G/DL (ref 32–36)
MCV RBC AUTO: 87.3 FL (ref 80–100)
MONOCYTES # BLD AUTO: 552 CELLS/UL (ref 200–950)
MONOCYTES NFR BLD AUTO: 6 %
NEUTROPHILS # BLD AUTO: 6256 CELLS/UL (ref 1500–7800)
NEUTROPHILS NFR BLD AUTO: 68 %
NONHDLC SERPL-MCNC: 129 MG/DL (CALC)
PLATELET # BLD AUTO: 350 THOUSAND/UL (ref 140–400)
PMV BLD REES-ECKER: 10.7 FL (ref 7.5–12.5)
POTASSIUM SERPL-SCNC: 4.4 MMOL/L (ref 3.5–5.3)
PROT SERPL-MCNC: 6.9 G/DL (ref 6.1–8.1)
RBC # BLD AUTO: 4.56 MILLION/UL (ref 3.8–5.1)
SODIUM SERPL-SCNC: 138 MMOL/L (ref 135–146)
TRIGL SERPL-MCNC: 85 MG/DL
TSH SERPL-ACNC: 0.76 MIU/L
WBC # BLD AUTO: 9.2 THOUSAND/UL (ref 3.8–10.8)

## 2024-09-23 RX ORDER — CLOBETASOL PROPIONATE 0.5 MG/G
OINTMENT TOPICAL
Qty: 60 G | Refills: 0 | Status: SHIPPED | OUTPATIENT
Start: 2024-09-23

## 2024-10-20 RX ORDER — CLOBETASOL PROPIONATE 0.5 MG/G
OINTMENT TOPICAL
Qty: 60 G | Refills: 0 | Status: SHIPPED | OUTPATIENT
Start: 2024-10-20

## 2024-10-20 NOTE — TELEPHONE ENCOUNTER
No care due was identified.  [facility] Embedded Care Due Messages. Reference number: [id number].   10/20/2024 5:08:31 AM CDT

## 2024-10-20 NOTE — TELEPHONE ENCOUNTER
Refill Decision Note   Pura Gudino  is requesting a refill authorization.  Brief Assessment and Rationale for Refill:  Approve     Medication Therapy Plan:        Comments:     Note composed:4:24 PM 10/20/2024

## 2024-10-21 ENCOUNTER — INITIAL CONSULT (OUTPATIENT)
Dept: VASCULAR SURGERY | Facility: CLINIC | Age: 44
End: 2024-10-21
Attending: SURGERY
Payer: COMMERCIAL

## 2024-10-21 VITALS
WEIGHT: 158.75 LBS | BODY MASS INDEX: 24.06 KG/M2 | SYSTOLIC BLOOD PRESSURE: 111 MMHG | DIASTOLIC BLOOD PRESSURE: 71 MMHG | HEART RATE: 82 BPM | TEMPERATURE: 98 F | HEIGHT: 68 IN

## 2024-10-21 DIAGNOSIS — M25.511 CHRONIC PAIN IN RIGHT SHOULDER: ICD-10-CM

## 2024-10-21 DIAGNOSIS — G24.3 CERVICAL DYSTONIA: ICD-10-CM

## 2024-10-21 DIAGNOSIS — I77.89 PECTORALIS MINOR SYNDROME: ICD-10-CM

## 2024-10-21 DIAGNOSIS — G89.29 CHRONIC PAIN IN RIGHT SHOULDER: ICD-10-CM

## 2024-10-21 DIAGNOSIS — G54.0 NEUROGENIC THORACIC OUTLET SYNDROME OF RIGHT BRACHIAL PLEXUS: Primary | ICD-10-CM

## 2024-10-21 PROCEDURE — 99203 OFFICE O/P NEW LOW 30 MIN: CPT | Mod: S$GLB,,, | Performed by: SURGERY

## 2024-10-21 PROCEDURE — 99999 PR PBB SHADOW E&M-EST. PATIENT-LVL V: CPT | Mod: PBBFAC,,, | Performed by: SURGERY

## 2024-10-21 NOTE — PROGRESS NOTES
VASCULAR SURGERY NOTE    Patient ID: Pura Gudino is a 44 y.o. female.    I. HISTORY     Chief Complaint: Thoracic Outlet Syndrome    HPI: Pura Gudino is a 44 y.o. female who is here today for new patient initial appointment. Initially evaluated in 2011 for symptoms. Reports that she has seen 4-5 different physical therapists and has seen a number of medical providers for evaluation. Reports that pain is constant and is located in the right shoulder. Has worsened since August. Pain moves up to the right face and jaw. Feels a hot sensation that moves up to the right side of the face, the right ear, and down the upper arm and the fingers. Starts dropping objects, such as plates and her laptop. Left hand dominant. Has difficulty completing work due to pain and difficulty with driving. Improvement with muscle relaxers, aleve, and heating pads. Has had relief in the past with steroid shots from rheumatologist. Intermittent pain on the left side, but not as extensive as right arm.    Previously played basketball and shotput in track. Previous weight .    Past Medical History:   Diagnosis Date    Allergy     Anemia     Asthma     Bipolar disorder, unspecified     Chronic diarrhea     Elevated C-reactive protein (CRP)     Fibromyalgia     MATT (generalized anxiety disorder)     GERD (gastroesophageal reflux disease)     Headache(784.0)     Insomnia     Irritable bowel syndrome     MDD (major depressive disorder)     Motion sickness     PCOS (polycystic ovarian syndrome)     Seizures     childhood    Seronegative rheumatoid arthritis         Past Surgical History:   Procedure Laterality Date    COLONOSCOPY N/A 12/21/2022    Procedure: COLONOSCOPY;  Surgeon: Mikala Parker MD;  Location: [facility];  Service: Endoscopy;  Laterality: N/A;    HYSTERECTOMY      LASER LAPAROSCOPY      left hip surgery had hardware replaced then removed      SINUS SURGERY       Social History     Occupational History     Occupation: psychologist   Tobacco Use    Smoking status: Never    Smokeless tobacco: Never   Substance and Sexual Activity    Alcohol use: No    Drug use: No    Sexual activity: Yes     Birth control/protection: OCP         Review of Systems   Constitutional: Negative for weight loss.   HENT:  Negative for ear pain and nosebleeds.    Eyes:  Negative for discharge and pain.   Cardiovascular:  Negative for chest pain and palpitations.   Respiratory:  Negative for cough, shortness of breath and wheezing.    Endocrine: Negative for cold intolerance, heat intolerance and polyphagia.   Hematologic/Lymphatic: Negative for adenopathy. Does not bruise/bleed easily.   Skin:  Negative for itching and rash.   Musculoskeletal:  Positive for muscle weakness. Negative for joint swelling and muscle cramps.   Gastrointestinal:  Negative for abdominal pain, diarrhea, nausea and vomiting.   Genitourinary:  Negative for dysuria and flank pain.   Neurological:  Positive for focal weakness and numbness. Negative for seizures.         II. PHYSICAL EXAM     Physical Exam  Constitutional:       Appearance: She is normal weight. She is not ill-appearing.   HENT:      Head: Normocephalic and atraumatic.   Eyes:      Extraocular Movements: Extraocular movements intact.      Pupils: Pupils are equal, round, and reactive to light.   Neck:      Comments: Tenderness of right scalene triangle with head rotation to the left. No tenderness of left scalene triangle with opposite head rotation.  Pulmonary:      Effort: Pulmonary effort is normal.   Chest:      Comments: Tenderness of right pec minor insertion site. Very tense pec minor insertion site. No tenderness in supraclavicular region. No tenderness of left side.  Musculoskeletal:      Cervical back: Normal range of motion. Tenderness present.   Skin:     General: Skin is warm and dry.   Neurological:      Mental Status: She is alert.       Arm Abduction:  Left: Normal ROM with no pain  Right:  Normal ROM, but patient reports pain of right shoulder and right upper arm    Scalene Triangle palpation:  Left: No tenderness to palpation  Right: No tenderness to palpation at rest, tender to palpation with head rotation to left    Pec Minor Insertion palpation:  Left: negative  Right: + local and + peripheral with resulting tingling sensation down upper arm and into fingers    EAST:  Left: negative  Right: heaviness and tingling in the right arm, no cyanosis notable. Veins appear slightly larger in size. Slightly dampened radial pulse    ULTT:  Left: 2+ radial artery, no change with raising arm  Right: 2+ radial artery, no change with raising arm    QuickDash Score: 56    III. ASSESSMENT & PLAN (MEDICAL DECISION MAKING)     1. Neurogenic thoracic outlet syndrome of right brachial plexus    2. Chronic pain in right shoulder    3. Cervical dystonia    4. Pectoralis minor syndrome      Imaging Results:  CXR 9/23/24: normal first ribs bilaterally. No cervical rib    Assessment/Diagnosis and Plan:  44 y.o. female who presents for evaluation of thoracic outlet syndrome. Physical exam and symptoms suggest neurogenic thoracic outlet/neurogenic pec minor syndrome. Thankfully patient was able to tolerate three minutes of EAST which would suggest that she would benefit from medical therapy prior to considering surgery. First would like patient to undergo PT/OT specifically for TOS. She has some symptoms complains of neck and shoulder tightness so will refer her to pain management for consideration of less invasive treatment options. Will re-evaluate patient in 6-8 weeks after botox and PT/OT.    - Referral to pain management for consideration of botox injections for treatment of possible cervical dystonia, neurogenic pec minor, and neurogenic TOS  - PT/OT referral for thoracic outlet syndrome  - Return to clinic in 6-8 weeks for evaluation of symptoms    Sarah Espinal DO  PGY-1    Attending attestation:  I have seen and  examined the patient and I have made the appropriate edit/attestation to the above note and agree with the above.    JAELYN Abel II, MD, [facility]  Vascular Surgery  [facility]

## 2024-10-23 ENCOUNTER — PATIENT MESSAGE (OUTPATIENT)
Dept: VASCULAR SURGERY | Facility: CLINIC | Age: 44
End: 2024-10-23
Payer: COMMERCIAL

## 2024-10-31 ENCOUNTER — CLINICAL SUPPORT (OUTPATIENT)
Dept: REHABILITATION | Facility: HOSPITAL | Age: 44
End: 2024-10-31
Attending: SURGERY
Payer: COMMERCIAL

## 2024-10-31 DIAGNOSIS — G54.0 NEUROGENIC THORACIC OUTLET SYNDROME OF RIGHT BRACHIAL PLEXUS: ICD-10-CM

## 2024-10-31 DIAGNOSIS — I77.89 PECTORALIS MINOR SYNDROME: ICD-10-CM

## 2024-10-31 DIAGNOSIS — R29.898 UPPER EXTREMITY WEAKNESS: Primary | ICD-10-CM

## 2024-10-31 PROCEDURE — 97161 PT EVAL LOW COMPLEX 20 MIN: CPT | Mod: PO

## 2024-10-31 PROCEDURE — 97530 THERAPEUTIC ACTIVITIES: CPT | Mod: PO

## 2024-11-04 ENCOUNTER — CLINICAL SUPPORT (OUTPATIENT)
Dept: REHABILITATION | Facility: HOSPITAL | Age: 44
End: 2024-11-04
Payer: COMMERCIAL

## 2024-11-04 ENCOUNTER — OFFICE VISIT (OUTPATIENT)
Dept: PHYSICAL MEDICINE AND REHAB | Facility: CLINIC | Age: 44
End: 2024-11-04
Payer: COMMERCIAL

## 2024-11-04 ENCOUNTER — HOSPITAL ENCOUNTER (OUTPATIENT)
Dept: RADIOLOGY | Facility: HOSPITAL | Age: 44
Discharge: HOME | End: 2024-11-04
Attending: INTERNAL MEDICINE
Payer: COMMERCIAL

## 2024-11-04 VITALS — HEIGHT: 68 IN | WEIGHT: 158 LBS | BODY MASS INDEX: 23.95 KG/M2

## 2024-11-04 DIAGNOSIS — G62.9 NEUROPATHY: ICD-10-CM

## 2024-11-04 DIAGNOSIS — R29.898 UPPER EXTREMITY WEAKNESS: Primary | ICD-10-CM

## 2024-11-04 DIAGNOSIS — G56.03 BILATERAL CARPAL TUNNEL SYNDROME: Primary | ICD-10-CM

## 2024-11-04 DIAGNOSIS — Z12.31 OTHER SCREENING MAMMOGRAM: ICD-10-CM

## 2024-11-04 PROCEDURE — 95911 NRV CNDJ TEST 9-10 STUDIES: CPT | Mod: S$GLB,,, | Performed by: STUDENT IN AN ORGANIZED HEALTH CARE EDUCATION/TRAINING PROGRAM

## 2024-11-04 PROCEDURE — 99499 UNLISTED E&M SERVICE: CPT | Mod: S$GLB,,, | Performed by: STUDENT IN AN ORGANIZED HEALTH CARE EDUCATION/TRAINING PROGRAM

## 2024-11-04 PROCEDURE — 99999 PR PBB SHADOW E&M-EST. PATIENT-LVL I: CPT | Mod: PBBFAC,,, | Performed by: STUDENT IN AN ORGANIZED HEALTH CARE EDUCATION/TRAINING PROGRAM

## 2024-11-04 PROCEDURE — 97110 THERAPEUTIC EXERCISES: CPT | Mod: PO,CQ

## 2024-11-04 PROCEDURE — 77067 SCR MAMMO BI INCL CAD: CPT | Mod: 26,,, | Performed by: RADIOLOGY

## 2024-11-04 PROCEDURE — 97140 MANUAL THERAPY 1/> REGIONS: CPT | Mod: PO,CQ

## 2024-11-04 PROCEDURE — 97112 NEUROMUSCULAR REEDUCATION: CPT | Mod: PO,CQ

## 2024-11-04 PROCEDURE — 77063 BREAST TOMOSYNTHESIS BI: CPT | Mod: TC,PO

## 2024-11-04 PROCEDURE — 77063 BREAST TOMOSYNTHESIS BI: CPT | Mod: 26,,, | Performed by: RADIOLOGY

## 2024-11-04 PROCEDURE — 95886 MUSC TEST DONE W/N TEST COMP: CPT | Mod: S$GLB,,, | Performed by: STUDENT IN AN ORGANIZED HEALTH CARE EDUCATION/TRAINING PROGRAM

## 2024-11-04 NOTE — PROGRESS NOTES
[facility]  Physical Medicine and Rehabilitation   [address] [place] [address], [address]  [place] [id number]      Full Name:  Pura Gudino  MRN#:       [id number]  Gender:     Female      Visit Date:          11/4/2024 08:54  Examining Physician:  Dorian Marcelino MD  Referring Physician:  Elvia Huerta MD  Height:              5 feet 6 inch  Weight:              158 lbs  Conclusion: BUE numbness in both hands.+ tinels at the left elbow      Sensory NCS      Nerve / Sites Rec. Site Onset Lat Peak Lat Ref. NP Amp Ref. PP Amp Ref. Segments Distance Velocity     ms ms ms µV µV µV µV  cm m/s   L Median - Digit II (Antidromic)      Wrist Dig II 3.59 4.48 <=3.40 28.5 >=15.0 61.2 >=20.0 Wrist - Dig II 13 36   R Median - Digit II (Antidromic)      Wrist Dig II 3.18 4.17 <=3.40 47.2 >=15.0 78.2 >=20.0 Wrist - Dig II 13 41   L Ulnar - Digit V (Antidromic)      Wrist Dig V 2.34 2.97 <=3.10 18.3 >=10.0 28.2 >=15.0 Wrist - Dig V 11 47   R Ulnar - Digit V (Antidromic)      Wrist Dig V 2.19 3.07 <=3.10 38.5 >=10.0 86.7 >=15.0 Wrist - Dig V 11 50   L Radial - Anatomical snuff box (Forearm)      Forearm Wrist 1.61 2.50 <=2.90 42.9 >=15.0 17.6 >=15.0 Forearm - Wrist 10 62   R Radial - Anatomical snuff box (Forearm)      Forearm Wrist 1.35 2.24 <=2.90 62.7 >=15.0 35.6 >=15.0 Forearm - Wrist 10 74       Motor NCS      Nerve / Sites Muscle Latency Ref. Amplitude Ref. Amp % Duration Segments Distance Lat Diff Velocity Ref.     ms ms mV mV % ms  cm ms m/s m/s   L Median - APB      Wrist APB 4.53 <=4.40 7.7 >=4.0 100 6.20 Wrist - APB 7         Elbow APB 8.33  7.4  95.8 6.61 Elbow - Wrist 20 3.80 53 >=49   R Median - APB      Wrist APB 4.64 <=4.40 7.5 >=4.0 100 6.41 Wrist - APB 7         Elbow APB 8.75  7.4  99.5 6.30 Elbow - Wrist 21 4.11 51 >=49   L Ulnar - ADM      Wrist ADM 2.92 <=3.60 8.5 >=5.0 100 6.77 Wrist - ADM 7         B.Elbow ADM 5.99  8.0  94 6.46 B.Elbow - Wrist 20 3.07 65 >=49      A.Elbow ADM 8.54  7.7  90.2 6.30  A.Elbow - B.Elbow 10 2.55 39 >=49   R Ulnar - ADM      Wrist ADM 3.07 <=3.60 11.3 >=5.0 100 7.03 Wrist - ADM 7         B.Elbow ADM 6.61  11.2  99.2 7.50 B.Elbow - Wrist 19 3.54 54 >=49      A.Elbow ADM 8.65  10.9  95.9 7.86 A.Elbow - B.Elbow 10 2.03 49 >=49       EMG Summary Table     Spontaneous MUAP Recruitment   Muscle Nerve Roots IA Fib PSW Fasc H.F. Amp Dur. PPP Pattern   L. Deltoid Axillary C5-C6 N None None None None N N N N   R. Deltoid Axillary C5-C6 N None None None None N N N N   L. Biceps brachii Musculocutaneous C5-C6 N None None None None N N N N   R. Biceps brachii Musculocutaneous C5-C6 N None None None None N N N N   L. Triceps brachii Radial C6-C8 N None None None None N N N N   R. Triceps brachii Radial C6-C8 N None None None None N N N N   L. Pronator teres Median C6-C7 N None None None None N N N N   R. Pronator teres Median C6-C7 N None None None None N N N N   L. Abductor pollicis brevis Median C8-T1 N None None None None N N N N   R. Abductor pollicis brevis Median C8-T1 N None None None None N N N N   L. First dorsal interosseous Ulnar C8-T1 N None None None None N N N N   R. First dorsal interosseous Ulnar C8-T1 N None None None None N N N N       Summary    The motor conduction test was performed on 4 nerve(s). The results were normal in 1 nerve(s): R Ulnar - ADM. Results outside the specified normal range were found in 3 nerve(s), as follows:  In the L Median - APB study  the take off latency result was increased for Wrist stimulation  In the R Median - APB study  the take off latency result was increased for Wrist stimulation  In the L Ulnar - ADM study  the take off velocity result was reduced for A.Elbow - B.Elbow segment    The sensory conduction test was performed on 6 nerve(s). The results were normal in 4 nerve(s): L Ulnar - Digit V (Antidromic), R Ulnar - Digit V (Antidromic), L Radial - Anatomical snuff box (Forearm), R Radial - Anatomical snuff box (Forearm). Results outside the  specified normal range were found in 2 nerve(s), as follows:  In the L Median - Digit II (Antidromic) study  the peak latency result was increased for Wrist stimulation  In the R Median - Digit II (Antidromic) study  the peak latency result was increased for Wrist stimulation    The needle EMG study was normal in all 12 tested muscles: L. Deltoid, R. Deltoid, L. Biceps brachii, R. Biceps brachii, L. Triceps brachii, R. Triceps brachii, L. Pronator teres, R. Pronator teres, L. Abductor pollicis brevis, R. Abductor pollicis brevis, L. First dorsal interosseous, R. First dorsal interosseous.    Conclusion:     Abnormal study    EMG and nerve conduction study of both upper extremities revealed the following:     BILATERAL mild to moderate median mononeuropathy at the wrist (carpal tunnel syndrome)   LEFT ulnar neuropathy across the elbow (cubital tunnel syndrome)     There was no electrodiagnostic evidence of cervical radiculopathy, plexopathy, peripheral polyneuropathy or myopathy.     PLAN: Recommended bracing of the involved wrists and elbow and f/u with referring provider for additional management.  ______________________  Dorian Marcelino MD

## 2024-11-04 NOTE — PROGRESS NOTES
"Physical Therapy Daily Treatment Note     Name: Pura Plummer [facility] Number: [id number]    Therapy Diagnosis:   Encounter Diagnosis   Name Primary?    Upper extremity weakness Yes     Physician: JAELYN Abel II, *    Visit Date: 11/4/2024    Physician Orders: PT Eval and Treat   Medical Diagnosis from Referral:   G54.0 (ICD-10-CM) - Neurogenic thoracic outlet syndrome of right brachial plexus   I77.89 (ICD-10-CM) - Pectoralis minor syndrome      Evaluation Date: 10/31/2024  Authorization Period Expiration: 10/21/2025  Plan of Care Expiration: 12/26/2024  Progress Note Due: 12/1/2024  Date of Surgery: NA  Visit # / Visits authorized: 1/ 20  FOTO: 1/ 3    Time In: 2:50 pm  Time Out: 3:43 pm  Total Billable Time: 53 minutes    Precautions: Standard    Subjective     Pt reports: moderate R UE pain levels upon arrival for first follow up visit. She notes difficulty with  strength as well.    She was compliant with home exercise program.  Response to previous treatment: First follow up  Functional change: TBD    Pain: 5/10  Location:  right upper extremity      Objective     Objective measures displayed on progress notes unless otherwise noted       Treatment      Pura received therapeutic exercises to develop strength, endurance, ROM, flexibility, posture and core stabilization for 30 minutes including:    UBE  x 6 minutes (forward/backward)  Pec stretch over towel roll x 3 minutes  Open books x 20 ea  UT stretch 3 x 30" ea  Scalene stretch 3 x 30" ea  Seated thoracic ext over 1/2 bolster x 20 3" holds    Pura received the following manual therapy techniques: Joint mobilizations, Manual traction, and Soft tissue Mobilization were applied to the: R shoulder and Pec minor for 08 minutes, including:    Gentle oscillations to R G/H  Posterior G/H joint mob  STM/cross friction massage to Pec Minor    Pura participated in neuromuscular re-education activities to improve: Balance, Coordination, " "Proprioception and Posture for 15 minutes. The following activities were included:    Horizontal abd over towel roll RTB 2 x 15  Diagonals over towel roll RTB x 20 ea  Chin tucks 2 x 10 3" hold    Home Exercises Provided and Patient Education Provided     Education provided:   - HEP review    Written Home Exercises Provided: Patient instructed to cont prior HEP.  Exercises were reviewed and Pura was able to demonstrate them prior to the end of the session.  Pura demonstrated good  understanding of the education provided.     See EMR under Patient Instructions for exercises provided prior visit.    Assessment     Pura returned for her first follow up visit reporting moderate R UE pain levels/symptoms. Treatment began with HEP review with additional emphasis on postural strengthening and mobility. Verbal cueing was required to ensure proper form on chin tucks to isolate cervical paraspinal musculature. Manual therapy techniques noted above performed in effort to decrease muscular tightness and improve habitual posture. Good tolerance overall to today's treatment with pt noting slight decrease in pain levels post treatment. Will continue to progress per pt's tolerance.    Pura Is progressing well towards her goals.   Pt prognosis is Fair.     Pt will continue to benefit from skilled outpatient physical therapy to address the deficits listed in the problem list box on initial evaluation, provide pt/family education and to maximize pt's level of independence in the home and community environment.     Pt's spiritual, cultural and educational needs considered and pt agreeable to plan of care and goals.     Anticipated barriers to physical therapy: pmhx    Goals: Short Term Goals: 4 weeks   Pt to report worst pain 4/10  Pt to improve FOTO by 10%  Pt to demo independence with initial HEP     Long Term Goals: 8 weeks   Pt to report worst pain 1/10  Pt to improve BUE strength by 1/2 grade  Pt to improve cervical " range of motion by 25%  Pt to improve FOTO by 20%  Pt to demo independence with final HEP     Plan      Plan of care Certification: 10/31/2024 to 12/26/2024.     Outpatient Physical Therapy 2 times weekly for 8 weeks to include the following interventions: Cervical/Lumbar Traction, Manual Therapy, Moist Heat/ Ice, Neuromuscular Re-ed, Patient Education, Self Care, Therapeutic Activities, and Therapeutic Exercise.     [place] Youngblood, PTA

## 2024-11-05 ENCOUNTER — TELEPHONE (OUTPATIENT)
Dept: RADIOLOGY | Facility: HOSPITAL | Age: 44
End: 2024-11-05
Payer: COMMERCIAL

## 2024-11-06 ENCOUNTER — OFFICE VISIT (OUTPATIENT)
Dept: PAIN MEDICINE | Facility: CLINIC | Age: 44
End: 2024-11-06
Payer: COMMERCIAL

## 2024-11-06 VITALS — BODY MASS INDEX: 23.95 KG/M2 | HEIGHT: 68 IN | WEIGHT: 158 LBS

## 2024-11-06 DIAGNOSIS — I77.89 PECTORALIS MINOR SYNDROME: ICD-10-CM

## 2024-11-06 DIAGNOSIS — G24.3 CERVICAL DYSTONIA: ICD-10-CM

## 2024-11-06 DIAGNOSIS — G54.0 NEUROGENIC THORACIC OUTLET SYNDROME OF RIGHT BRACHIAL PLEXUS: ICD-10-CM

## 2024-11-06 DIAGNOSIS — M54.2 CERVICALGIA: Primary | ICD-10-CM

## 2024-11-06 PROCEDURE — 99999 PR PBB SHADOW E&M-EST. PATIENT-LVL V: CPT | Mod: PBBFAC,,, | Performed by: STUDENT IN AN ORGANIZED HEALTH CARE EDUCATION/TRAINING PROGRAM

## 2024-11-06 PROCEDURE — 99214 OFFICE O/P EST MOD 30 MIN: CPT | Mod: S$GLB,,, | Performed by: STUDENT IN AN ORGANIZED HEALTH CARE EDUCATION/TRAINING PROGRAM

## 2024-11-06 PROCEDURE — 3008F BODY MASS INDEX DOCD: CPT | Mod: CPTII,S$GLB,, | Performed by: STUDENT IN AN ORGANIZED HEALTH CARE EDUCATION/TRAINING PROGRAM

## 2024-11-06 PROCEDURE — 1160F RVW MEDS BY RX/DR IN RCRD: CPT | Mod: CPTII,S$GLB,, | Performed by: STUDENT IN AN ORGANIZED HEALTH CARE EDUCATION/TRAINING PROGRAM

## 2024-11-06 PROCEDURE — 1159F MED LIST DOCD IN RCRD: CPT | Mod: CPTII,S$GLB,, | Performed by: STUDENT IN AN ORGANIZED HEALTH CARE EDUCATION/TRAINING PROGRAM

## 2024-11-06 NOTE — PROGRESS NOTES
[facility] - Department    Berenice Garrido MD      First Office Visit: 8/28/24  Today' Date: 11/6/2024  Last Office Visit: 8/28/24    Chief complaint: neck pain    HPI: Pt is a pleasant 44 y.o., who presents for evaluation. Referred by self. Pt seen previously for neck pain and bilateral shoulder pain since 2010. Pt seen today for 6 wk f/u. In the interim, pt has had XR C-spine, XR B shoulder and BUE EMG. Findings were remarkable for some degenerative changes at C5-6, B CTS, and L cubital tunnel syndrome. States pain currently is mostly in the neck and goes down to bilateral shoulders and traps.  Does endorse tenderness of bilateral traps.  Is open to trying Botox. Of note, pt states she does have known L rotator cuff tear. Denies having sharp, shooting pain going down both arms. MR C-spine from 5 yrs ago does show L foraminal stenosis at C5-6. Denies headaches, balance issues, no problems dropping objects. No BB changes. Is doing HEP.         Pain disability index score: 63  Pain score: 3    Relevant Imaging/ Testing:     MR R shoulder 9/24 - partial thickness tear of infraspinatus tendon  XR R shoulder 9/24  XR B shoulders 8/24  XR L-spine 10/16  XR C-spine 10/16, 8/24  MR C-spine 9/16  MR L-spine 9/16    Procedures: None    Date of board of pharmacy review:11/6/2024  Date of opioid risk screening/ pain psych: None  Date of opioid agreement and consent: None  Date of urine drug screen: None  Date of random pill count: None     was reviewed today: reviewed, clonazepam use    Prescribed medications: None    See EHR for  PMH, PSH, FH, SH, Medications and Allergy    ROS:  Positive for pain  ROS     PE:  There were no vitals filed for this visit.  General: Pleasant, no distress  HEENT: NC/ AT. PERRLA  CV: Radial pulses intact  Pulm: No distress  Ext: No edema    Physical Exam     Neuromusculoskeletal:  Head: NC, AT. PERRLA. No occipital tenderness  Neck: Intact range of motions, extension, flexion, rotation.  Bilat Facet loading. Neg Spurling. Min Tenderness.  5/5 Strength, normal tone. Neg Brady's  Shoulder: Intact range of motion. Min Bicep groove tenderness. 5/5 Strength  Lumbar: Intact range of motion  Hip: Intact range of motion  SI: Level  Knee: Intact range of motion  Reflexes: normal Bicep  Strength: 5/5 globally   Sensory: decreased sensation of R arm   Skin: No bruising, erythema  Gait: Normal      Impression:  Neck pain  Bilateral shoulder pain  Relevant History  BMI 23.81  Bipolar  Anxiety  Fibromyalgia     Assessment:  Cervical radiculopathy   R shoulder rotator cuff tear  B CTS  L cubital tunnel syndrome       Plan:  Discussed options  Imaging/ relevant records viewed/ reviewed/ discussed  Imaging results viewed and reviewed (noted above)/ reviewed with patient   reviewed  Cont HEP  MR BUZZ-pine  Schedule for cervical Botox   Re-eval after  Consider ILESI C7-T1  Cont care with Dr. Peña for shoulder          Prescribed medications:  1. None    The impression and plan were discussed and explained in detail. All the questions were answered. Education was provided accordingly.     The procedure was explained in detail, along with risks and potential side effects.        Follow-up:  For botox     Chloe Huerta MD

## 2024-11-13 ENCOUNTER — PATIENT MESSAGE (OUTPATIENT)
Dept: PAIN MEDICINE | Facility: CLINIC | Age: 44
End: 2024-11-13
Payer: COMMERCIAL

## 2024-11-18 ENCOUNTER — PATIENT MESSAGE (OUTPATIENT)
Dept: FAMILY MEDICINE | Facility: CLINIC | Age: 44
End: 2024-11-18
Payer: COMMERCIAL

## 2024-11-18 ENCOUNTER — HOSPITAL ENCOUNTER (OUTPATIENT)
Dept: RADIOLOGY | Facility: HOSPITAL | Age: 44
Discharge: HOME | End: 2024-11-18
Attending: STUDENT IN AN ORGANIZED HEALTH CARE EDUCATION/TRAINING PROGRAM
Payer: COMMERCIAL

## 2024-11-18 DIAGNOSIS — M54.2 CERVICALGIA: ICD-10-CM

## 2024-11-18 DIAGNOSIS — E04.1 THYROID NODULE: Primary | ICD-10-CM

## 2024-11-18 PROCEDURE — 72141 MRI NECK SPINE W/O DYE: CPT | Mod: 26,,, | Performed by: RADIOLOGY

## 2024-11-18 PROCEDURE — 72141 MRI NECK SPINE W/O DYE: CPT | Mod: TC,PO

## 2024-11-19 ENCOUNTER — CLINICAL SUPPORT (OUTPATIENT)
Dept: REHABILITATION | Facility: HOSPITAL | Age: 44
End: 2024-11-19
Payer: COMMERCIAL

## 2024-11-19 DIAGNOSIS — R29.898 UPPER EXTREMITY WEAKNESS: Primary | ICD-10-CM

## 2024-11-19 PROCEDURE — 97140 MANUAL THERAPY 1/> REGIONS: CPT | Mod: PO,CQ

## 2024-11-19 PROCEDURE — 97112 NEUROMUSCULAR REEDUCATION: CPT | Mod: PO,CQ

## 2024-11-19 PROCEDURE — 97110 THERAPEUTIC EXERCISES: CPT | Mod: PO,CQ

## 2024-11-19 RX ORDER — CLOBETASOL PROPIONATE 0.5 MG/G
OINTMENT TOPICAL
Qty: 60 G | Refills: 0 | Status: CANCELLED | OUTPATIENT
Start: 2024-11-19

## 2024-11-19 RX ORDER — CLOBETASOL PROPIONATE 0.5 MG/G
OINTMENT TOPICAL
Qty: 60 G | Refills: 0 | Status: SHIPPED | OUTPATIENT
Start: 2024-11-19

## 2024-11-19 NOTE — TELEPHONE ENCOUNTER
No care due was identified.  Health [facility] Embedded Care Due Messages. Reference number: [id number].   11/19/2024 5:08:23 AM CST

## 2024-11-19 NOTE — TELEPHONE ENCOUNTER
No care due was identified.  Health [facility] Embedded Care Due Messages. Reference number: [id number].   11/19/2024 9:59:41 AM CST

## 2024-11-19 NOTE — PROGRESS NOTES
"Physical Therapy Daily Treatment Note     Name: Pura Plummer [facility] Number: [id number]    Therapy Diagnosis:   Encounter Diagnosis   Name Primary?    Upper extremity weakness Yes     Physician: JAELYN Abel II, *    Visit Date: 11/19/2024    Physician Orders: PT Eval and Treat   Medical Diagnosis from Referral:   G54.0 (ICD-10-CM) - Neurogenic thoracic outlet syndrome of right brachial plexus   I77.89 (ICD-10-CM) - Pectoralis minor syndrome      Evaluation Date: 10/31/2024  Authorization Period Expiration: 10/21/2025  Plan of Care Expiration: 12/26/2024  Progress Note Due: 12/1/2024  Date of Surgery: NA  Visit # / Visits authorized: 2/ 20  FOTO: 1/ 3    Time In: 4:00 pm  Time Out: 5:00 pm  Total Billable Time: 60 minutes    Precautions: Standard    Subjective     Pt reports: getting MRI done on her neck last week where imaging found "nodules on my lymph nodes". Pt notes continued  strength deficits that make it difficult to hold most objects.    She was compliant with home exercise program.  Response to previous treatment: no adverse effects  Functional change: TBD    Pain: 4/10  Location:  right upper extremity      Objective     Objective measures displayed on progress notes unless otherwise noted       Treatment      Pura received therapeutic exercises to develop strength, endurance, ROM, flexibility, posture and core stabilization for 35 minutes including:    UBE  x 6 minutes (forward/backward)  Pec stretch over towel roll x 3 minutes  Open books x 20 ea  UT stretch 3 x 30" ea  Scalene stretch 3 x 30" ea  Seated thoracic ext over 1/2 bolster x 20 3" holds  Standing L sided lat stretch 3 x 30"    Pura received the following manual therapy techniques: Joint mobilizations, Manual traction, and Soft tissue Mobilization were applied to the: R shoulder and Pec minor for 08 minutes, including:    Gentle oscillations to R G/H  Posterior G/H joint mob  STM/cross friction massage to Pec " "Minor    Pura participated in neuromuscular re-education activities to improve: Balance, Coordination, Proprioception and Posture for 17 minutes. The following activities were included:    Horizontal abd over towel roll RTB 2 x 15  Diagonals over towel roll RTB x 20 ea  Chin tucks 2 x 10 3" hold    Home Exercises Provided and Patient Education Provided     Education provided:   - HEP review    Written Home Exercises Provided: Patient instructed to cont prior HEP.  Exercises were reviewed and Pura was able to demonstrate them prior to the end of the session.  Pura demonstrated good  understanding of the education provided.     See EMR under Patient Instructions for exercises provided prior visit.    Assessment   Pura returned reporting continued R UE pain levels and  strength deficits. Pt recently got an MRI which found nodules on thyroid glands. Pt notes further testing will be done on this in near future. Treatment continued with postural strengthening and mobility and manual therapy techniques in effort to decrease muscular tightness and relieve symptoms down R UE. Good tolerance overall to today's treatment with pt noting slight decrease in pain levels post treatment. Will continue to progress per pt's tolerance.    Pura Is progressing well towards her goals.   Pt prognosis is Fair.     Pt will continue to benefit from skilled outpatient physical therapy to address the deficits listed in the problem list box on initial evaluation, provide pt/family education and to maximize pt's level of independence in the home and community environment.     Pt's spiritual, cultural and educational needs considered and pt agreeable to plan of care and goals.     Anticipated barriers to physical therapy: pmhx    Goals: Short Term Goals: 4 weeks   Pt to report worst pain 4/10  Pt to improve FOTO by 10%  Pt to demo independence with initial HEP     Long Term Goals: 8 weeks   Pt to report worst pain 1/10  Pt to " improve BUE strength by 1/2 grade  Pt to improve cervical range of motion by 25%  Pt to improve FOTO by 20%  Pt to demo independence with final HEP     Plan      Plan of care Certification: 10/31/2024 to 12/26/2024.     Outpatient Physical Therapy 2 times weekly for 8 weeks to include the following interventions: Cervical/Lumbar Traction, Manual Therapy, Moist Heat/ Ice, Neuromuscular Re-ed, Patient Education, Self Care, Therapeutic Activities, and Therapeutic Exercise.     Montana Youngblood, PTA

## 2024-11-20 ENCOUNTER — TELEPHONE (OUTPATIENT)
Dept: PAIN MEDICINE | Facility: CLINIC | Age: 44
End: 2024-11-20
Payer: COMMERCIAL

## 2024-11-20 ENCOUNTER — HOSPITAL ENCOUNTER (OUTPATIENT)
Dept: RADIOLOGY | Facility: HOSPITAL | Age: 44
Discharge: HOME | End: 2024-11-20
Attending: INTERNAL MEDICINE
Payer: COMMERCIAL

## 2024-11-20 DIAGNOSIS — E04.1 THYROID NODULE: ICD-10-CM

## 2024-11-20 PROCEDURE — 76536 US EXAM OF HEAD AND NECK: CPT | Mod: 26,,, | Performed by: RADIOLOGY

## 2024-11-20 PROCEDURE — 76536 US EXAM OF HEAD AND NECK: CPT | Mod: TC,PO

## 2024-11-20 NOTE — TELEPHONE ENCOUNTER
I have tried this patient the past two days and today I left a message for her to call back for her imaging results per her doctor

## 2024-11-21 DIAGNOSIS — G24.3 CERVICAL DYSTONIA: Primary | ICD-10-CM

## 2024-11-22 ENCOUNTER — PATIENT MESSAGE (OUTPATIENT)
Dept: FAMILY MEDICINE | Facility: CLINIC | Age: 44
End: 2024-11-22
Payer: COMMERCIAL

## 2024-11-25 ENCOUNTER — OFFICE VISIT (OUTPATIENT)
Dept: FAMILY MEDICINE | Facility: CLINIC | Age: 44
End: 2024-11-25
Payer: COMMERCIAL

## 2024-11-25 DIAGNOSIS — E04.2 MULTIPLE THYROID NODULES: Primary | ICD-10-CM

## 2024-11-25 DIAGNOSIS — E04.1 THYROID NODULE: Primary | ICD-10-CM

## 2024-11-25 PROCEDURE — 99212 OFFICE O/P EST SF 10 MIN: CPT | Mod: 95,,,

## 2024-11-25 NOTE — PROGRESS NOTES
"The patient location is: [place]   The chief complaint leading to consultation is: lab/imaging results    Visit type: audiovisual    Face to Face time with patient: 5  15 minutes of total time spent on the encounter, which includes face to face time and non-face to face time preparing to see the patient (eg, review of tests), Obtaining and/or reviewing separately obtained history, Documenting clinical information in the electronic or other health record, Independently interpreting results (not separately reported) and communicating results to the patient/family/caregiver, or Care coordination (not separately reported).         Each patient to whom he or she provides medical services by telemedicine is:  (1) informed of the relationship between the physician and patient and the respective role of any other health care provider with respect to management of the patient; and (2) notified that he or she may decline to receive medical services by telemedicine and may withdraw from such care at any time.    Notes:       Name: Pura Gudino  MRN: [id number]  : 1980  PCP: Berenice Garrido MD    HPI    Patient follows with Dr. Garrido, new to me. She presents for wanting to review her thyroid US results. US results showed "Multinodular goiter with 11 mm TI-RADS 5 nodule in the lower pole of the right thyroid lobe medially and 11 mm TI-RADS 5 nodule at the junction of the upper pole and midpole of the left thyroid lobe, both of which meet the criteria for FNA/core biopsy." The US FNA biopsy has been ordered per Dr. Garrido.     Review of Systems   HENT:  Positive for ear pain and sore throat. Negative for ear discharge, hearing loss and rhinorrhea.    Respiratory:  Negative for cough.    Gastrointestinal:  Negative for abdominal pain, diarrhea and vomiting.   Musculoskeletal:  Positive for neck pain.   Skin:  Negative for rash.   Neurological:  Positive for headaches.       Patient Active Problem List   Diagnosis    " Fibromyalgia    History of polycystic ovarian disease    Bipolar disorder    Generalized anxiety disorder    IBS (irritable bowel syndrome)    GERD (gastroesophageal reflux disease)    Shoulder pain, bilateral    Pain of both elbows    Headache(784.0)    RA (rheumatoid arthritis)    Drug-induced immunodeficiency    Upper extremity weakness       There were no vitals filed for this visit.    Physical Exam  Constitutional:       General: She is not in acute distress.     Appearance: She is well-developed.   HENT:      Head: Normocephalic and atraumatic.      Right Ear: External ear normal.      Left Ear: External ear normal.   Eyes:      Conjunctiva/sclera: Conjunctivae normal.      Pupils: Pupils are equal, round, and reactive to light.   Musculoskeletal:         General: No swelling or tenderness. Normal range of motion.      Cervical back: Normal range of motion and neck supple.   Skin:     General: Skin is warm and dry.      Coloration: Skin is not jaundiced or pale.   Neurological:      General: No focal deficit present.      Mental Status: She is alert and oriented to person, place, and time.      Cranial Nerves: No cranial nerve deficit.   Psychiatric:         Mood and Affect: Mood normal.         Behavior: Behavior normal.       1. Multiple thyroid nodules   Schedule US FNA ordered by Dr. Garrido. TSH and US thyroid reviewed with patient        MIKEY Castano  11/25/2024

## 2024-11-26 ENCOUNTER — PATIENT MESSAGE (OUTPATIENT)
Dept: FAMILY MEDICINE | Facility: CLINIC | Age: 44
End: 2024-11-26
Payer: COMMERCIAL

## 2024-11-26 ENCOUNTER — CLINICAL SUPPORT (OUTPATIENT)
Dept: REHABILITATION | Facility: HOSPITAL | Age: 44
End: 2024-11-26
Payer: COMMERCIAL

## 2024-11-26 ENCOUNTER — HOSPITAL ENCOUNTER (OUTPATIENT)
Dept: RADIOLOGY | Facility: HOSPITAL | Age: 44
Discharge: HOME | End: 2024-11-26
Attending: INTERNAL MEDICINE
Payer: COMMERCIAL

## 2024-11-26 ENCOUNTER — TELEPHONE (OUTPATIENT)
Dept: FAMILY MEDICINE | Facility: CLINIC | Age: 44
End: 2024-11-26
Payer: COMMERCIAL

## 2024-11-26 DIAGNOSIS — R92.8 ABNORMALITY OF RIGHT BREAST ON SCREENING MAMMOGRAM: ICD-10-CM

## 2024-11-26 DIAGNOSIS — R29.898 UPPER EXTREMITY WEAKNESS: Primary | ICD-10-CM

## 2024-11-26 PROCEDURE — 97110 THERAPEUTIC EXERCISES: CPT | Mod: PO

## 2024-11-26 PROCEDURE — 77061 BREAST TOMOSYNTHESIS UNI: CPT | Mod: 26,RT,, | Performed by: RADIOLOGY

## 2024-11-26 PROCEDURE — 97140 MANUAL THERAPY 1/> REGIONS: CPT | Mod: PO

## 2024-11-26 PROCEDURE — 77065 DX MAMMO INCL CAD UNI: CPT | Mod: TC,PO,RT

## 2024-11-26 PROCEDURE — 77065 DX MAMMO INCL CAD UNI: CPT | Mod: 26,RT,, | Performed by: RADIOLOGY

## 2024-11-26 PROCEDURE — 97112 NEUROMUSCULAR REEDUCATION: CPT | Mod: PO

## 2024-11-26 NOTE — TELEPHONE ENCOUNTER
Left voicemail for patient regarding thyroid message.    Looks like pt appointment was schedule one minute after this message was received.

## 2024-11-26 NOTE — PROGRESS NOTES
"Physical Therapy Daily Treatment Note     Name: Pura Plummer [facility] Number: [id number]    Therapy Diagnosis:   Encounter Diagnosis   Name Primary?    Upper extremity weakness Yes     Physician: JAELYN Abel II, *    Visit Date: 11/26/2024    Physician Orders: PT Eval and Treat   Medical Diagnosis from Referral:   G54.0 (ICD-10-CM) - Neurogenic thoracic outlet syndrome of right brachial plexus   I77.89 (ICD-10-CM) - Pectoralis minor syndrome      Evaluation Date: 10/31/2024  Authorization Period Expiration: 10/21/2025  Plan of Care Expiration: 12/26/2024  Progress Note Due: 12/1/2024  Date of Surgery: NA  Visit # / Visits authorized: 3/ 20  FOTO: 1/ 3    Time In: 3:00 pm  Time Out: 3:55 pm  Total Billable Time: 55 minutes    Precautions: Standard    Subjective     Pt reports: having an MRI done, which revealed significant degenerative changes at C5-C6. She reports continues N/T in her BUEs to this point.     She was compliant with home exercise program.  Response to previous treatment: no adverse effects  Functional change: TBD    Pain: 4/10  Location:  right upper extremity      Objective     Objective measures displayed on progress notes unless otherwise noted       Treatment      Pura received therapeutic exercises to develop strength, endurance, ROM, flexibility, posture and core stabilization for 30 minutes including:    UBE  x 6 minutes (forward/backward)  Pec stretch over towel roll x 3 minutes  Open books x 20 ea  UT stretch 3 x 30" ea  Scalene stretch 3 x 30" ea  Seated thoracic ext over 1/2 bolster x 20 3" holds  Standing L sided lat stretch 3 x 30"    Pura received the following manual therapy techniques: Joint mobilizations, Manual traction, and Soft tissue Mobilization were applied to the: R shoulder and Pec minor for 10 minutes, including:    Suboccipital release  STM to B upper trap  STM to B cervical paraspinals  Manual cervical traction    Pura participated in neuromuscular " "re-education activities to improve: Balance, Coordination, Proprioception and Posture for 15 minutes. The following activities were included:    Horizontal abd over towel roll RTB 2 x 15  Diagonals over towel roll RTB x 20 ea  Chin tucks 2 x 10 3" hold  Median nerve glides x30  No money c RTB 3x10    Home Exercises Provided and Patient Education Provided     Education provided:   - HEP review    Written Home Exercises Provided: Patient instructed to cont prior HEP.  Exercises were reviewed and Pura was able to demonstrate them prior to the end of the session.  Pura demonstrated good  understanding of the education provided.     See EMR under Patient Instructions for exercises provided prior visit.    Assessment   Pt noted to tolerate today's increased focus on neural mobility and cervical flexibility well. Mild N/T still present, but this improved with cervical traction today, suggesting radicular referral of pain at this time. Patient will continue to benefit from loading and progressions to tolerance going forward to improve overall function.    Pura Is progressing well towards her goals.   Pt prognosis is Fair.     Pt will continue to benefit from skilled outpatient physical therapy to address the deficits listed in the problem list box on initial evaluation, provide pt/family education and to maximize pt's level of independence in the home and community environment.     Pt's spiritual, cultural and educational needs considered and pt agreeable to plan of care and goals.     Anticipated barriers to physical therapy: pmhx    Goals: Short Term Goals: 4 weeks   Pt to report worst pain 4/10  Pt to improve FOTO by 10%  Pt to demo independence with initial HEP     Long Term Goals: 8 weeks   Pt to report worst pain 1/10  Pt to improve BUE strength by 1/2 grade  Pt to improve cervical range of motion by 25%  Pt to improve FOTO by 20%  Pt to demo independence with final HEP     Plan      Plan of care " Certification: 10/31/2024 to 12/26/2024.     Outpatient Physical Therapy 2 times weekly for 8 weeks to include the following interventions: Cervical/Lumbar Traction, Manual Therapy, Moist Heat/ Ice, Neuromuscular Re-ed, Patient Education, Self Care, Therapeutic Activities, and Therapeutic Exercise.     Akhil Rogers, PT

## 2024-12-02 ENCOUNTER — TELEPHONE (OUTPATIENT)
Dept: PAIN MEDICINE | Facility: CLINIC | Age: 44
End: 2024-12-02
Payer: COMMERCIAL

## 2024-12-02 NOTE — TELEPHONE ENCOUNTER
Chloe Huerta MD Cromer, Tiffany Ann, LPN  Yes please. TY. ROXANN          Previous Messages       ----- Message -----  From: Aruna Cortes LPN  Sent: 11/27/2024   3:56 PM CST  To: Chloe Huerta MD  Subject: FW: Botox Denied                                Would you like me to schedule a p2p on 12/02 when I return?  ----- Message -----  From: Emilie Long  Sent: 11/27/2024   3:52 PM CST  To: Aruna Cortes LPN; Deanna Corona Staff  Subject: Botox Denied                                    Dr. Huerta,    Botox request has been denied, here's an explanation why:  There is no documentation that the patient cervical dystonia is associated with sustained head tilt or abnormal posturing with limited posturing with limited range of motion in the neck and a history of recurrent involuntary contraction of one or more of the muscles of the neck ( for example, sternocleidomastoid, splenius, trapezius, or posterior cervical muscles). Services considered to be investigational are not covered.  The provider may verbally request an informal reconsideration with a physician within 10 days of the denial by calling [phone].    Thanks,  Emilie        Spoke with Yasmin and this case has to have 3 days available 9-5 gave Tuesday 12/03 12/04 wed or Thursday 12/06 the number will be unknown as any Dr Can call they will call your cell

## 2024-12-02 NOTE — TELEPHONE ENCOUNTER
Spoke with this patient and let her know that her doctor is working on getting her botox appointment approved and we will call her to get her scheduled once it is approved.

## 2024-12-02 NOTE — TELEPHONE ENCOUNTER
Estela, Pt p2p will likely not be done by appt this week please call her and let her know we are working on it and when overturned we will call to RS.       Dr Huerta, Spoke with Yasmin and this case has to have 3 days available 9-5 gave Tuesday 12/03 12/04 wed or Thursday 12/06 the number will be unknown as any Dr Can call they will call your cell

## 2024-12-04 ENCOUNTER — CLINICAL SUPPORT (OUTPATIENT)
Dept: REHABILITATION | Facility: HOSPITAL | Age: 44
End: 2024-12-04
Payer: COMMERCIAL

## 2024-12-04 DIAGNOSIS — R29.898 UPPER EXTREMITY WEAKNESS: Primary | ICD-10-CM

## 2024-12-04 PROCEDURE — 97110 THERAPEUTIC EXERCISES: CPT | Mod: PO

## 2024-12-04 PROCEDURE — 97112 NEUROMUSCULAR REEDUCATION: CPT | Mod: PO

## 2024-12-04 NOTE — PROGRESS NOTES
"Physical Therapy Daily Treatment Note     Name: Pura Plummer [facility] Number: [id number]    Therapy Diagnosis:   Encounter Diagnosis   Name Primary?    Upper extremity weakness Yes     Physician: JAELYN Abel II, *    Visit Date: 12/4/2024    Physician Orders: PT Eval and Treat   Medical Diagnosis from Referral:   G54.0 (ICD-10-CM) - Neurogenic thoracic outlet syndrome of right brachial plexus   I77.89 (ICD-10-CM) - Pectoralis minor syndrome      Evaluation Date: 10/31/2024  Authorization Period Expiration: 10/21/2025  Plan of Care Expiration: 12/26/2024  Progress Note Due: 12/1/2024  Date of Surgery: NA  Visit # / Visits authorized: 4/ 20  FOTO: 1/ 3    Time In: 4:00 pm  Time Out: 4:53 pm  Total Billable Time: 53 minutes    Precautions: Standard    Subjective     Pt reports: having some noticeable improvements in pain over recent days    She was compliant with home exercise program.  Response to previous treatment: no adverse effects  Functional change: TBD    Pain: 4/10  Location:  right upper extremity      Objective     Objective measures displayed on progress notes unless otherwise noted       Treatment      Pura received therapeutic exercises to develop strength, endurance, ROM, flexibility, posture and core stabilization for 30 minutes including:    UBE  x 6 minutes (forward/backward)  Pec stretch over towel roll x 3 minutes  Open books x 20 ea  UT stretch 3 x 30" ea  Scalene stretch 3 x 30" ea  Seated thoracic ext over 1/2 bolster x 20 3" holds  Standing L sided lat stretch 3 x 30"  PRECOR ROW 2x10 30#    Pura received the following manual therapy techniques: Joint mobilizations, Manual traction, and Soft tissue Mobilization were applied to the: R shoulder and Pec minor for 0 minutes, including:    Suboccipital release  STM to B upper trap  STM to B cervical paraspinals  Manual cervical traction    Pura participated in neuromuscular re-education activities to improve: Balance, " "Coordination, Proprioception and Posture for 23 minutes. The following activities were included:    Horizontal abd over towel roll RTB 2 x 15  Diagonals over towel roll RTB x 20 ea  Chin tucks 2 x 10 3" hold  Median nerve glides x30  No money c RTB 3x10    Home Exercises Provided and Patient Education Provided     Education provided:   - HEP review    Written Home Exercises Provided: Patient instructed to cont prior HEP.  Exercises were reviewed and Pura was able to demonstrate them prior to the end of the session.  Pura demonstrated good  understanding of the education provided.     See EMR under Patient Instructions for exercises provided prior visit.    Assessment   Pt progressed to initiate machine based postural re-education exercises today with good overall tolerance and no adverse reactions. Going forward, pt will continue to benefit from progressive loading in more functional movement patterns to maximize return of overall function.    Pura Is progressing well towards her goals.   Pt prognosis is Fair.     Pt will continue to benefit from skilled outpatient physical therapy to address the deficits listed in the problem list box on initial evaluation, provide pt/family education and to maximize pt's level of independence in the home and community environment.     Pt's spiritual, cultural and educational needs considered and pt agreeable to plan of care and goals.     Anticipated barriers to physical therapy: pmhx    Goals: Short Term Goals: 4 weeks   Pt to report worst pain 4/10  Pt to improve FOTO by 10%  Pt to demo independence with initial HEP     Long Term Goals: 8 weeks   Pt to report worst pain 1/10  Pt to improve BUE strength by 1/2 grade  Pt to improve cervical range of motion by 25%  Pt to improve FOTO by 20%  Pt to demo independence with final HEP     Plan      Plan of care Certification: 10/31/2024 to 12/26/2024.     Outpatient Physical Therapy 2 times weekly for 8 weeks to include the " following interventions: Cervical/Lumbar Traction, Manual Therapy, Moist Heat/ Ice, Neuromuscular Re-ed, Patient Education, Self Care, Therapeutic Activities, and Therapeutic Exercise.     Akhil Rogers, PT

## 2024-12-05 ENCOUNTER — TELEPHONE (OUTPATIENT)
Dept: PAIN MEDICINE | Facility: CLINIC | Age: 44
End: 2024-12-05
Payer: COMMERCIAL

## 2024-12-05 NOTE — TELEPHONE ENCOUNTER
----- Message from Chloe Huerta MD sent at 12/5/2024 12:15 PM CST -----  Regarding: botox not approved  Botox was not approved unfortunately. PARVEEN HACKETT

## 2024-12-10 ENCOUNTER — HOSPITAL ENCOUNTER (OUTPATIENT)
Dept: RADIOLOGY | Facility: HOSPITAL | Age: 44
Discharge: HOME | End: 2024-12-10
Attending: INTERNAL MEDICINE
Payer: COMMERCIAL

## 2024-12-10 DIAGNOSIS — E04.1 THYROID NODULE: ICD-10-CM

## 2024-12-10 PROCEDURE — 10005 FNA BX W/US GDN 1ST LES: CPT | Mod: ,,, | Performed by: RADIOLOGY

## 2024-12-10 PROCEDURE — 10005 FNA BX W/US GDN 1ST LES: CPT | Mod: PO

## 2024-12-10 PROCEDURE — 88173 CYTOPATH EVAL FNA REPORT: CPT | Mod: 59,PO | Performed by: PATHOLOGY

## 2024-12-10 PROCEDURE — 10006 FNA BX W/US GDN EA ADDL: CPT | Mod: PO

## 2024-12-10 PROCEDURE — 10006 FNA BX W/US GDN EA ADDL: CPT | Mod: ,,, | Performed by: RADIOLOGY

## 2024-12-10 PROCEDURE — 63600175 PHARM REV CODE 636 W HCPCS: Mod: PO | Performed by: INTERNAL MEDICINE

## 2024-12-10 RX ORDER — LIDOCAINE HYDROCHLORIDE 10 MG/ML
5 INJECTION, SOLUTION INFILTRATION; PERINEURAL ONCE
Status: COMPLETED | OUTPATIENT
Start: 2024-12-10 | End: 2024-12-10

## 2024-12-10 RX ADMIN — LIDOCAINE HYDROCHLORIDE 5 ML: 10 INJECTION, SOLUTION INFILTRATION; PERINEURAL at 08:12

## 2024-12-11 LAB
FINAL PATHOLOGIC DIAGNOSIS: NORMAL
FINAL PATHOLOGIC DIAGNOSIS: NORMAL
Lab: NORMAL
Lab: NORMAL

## 2024-12-16 ENCOUNTER — OFFICE VISIT (OUTPATIENT)
Dept: VASCULAR SURGERY | Facility: CLINIC | Age: 44
End: 2024-12-16
Attending: SURGERY
Payer: COMMERCIAL

## 2024-12-16 VITALS
HEART RATE: 68 BPM | SYSTOLIC BLOOD PRESSURE: 100 MMHG | WEIGHT: 158.75 LBS | HEIGHT: 68 IN | DIASTOLIC BLOOD PRESSURE: 62 MMHG | TEMPERATURE: 98 F | BODY MASS INDEX: 24.06 KG/M2

## 2024-12-16 DIAGNOSIS — G56.00 MEDIAN NERVE COMPRESSION: ICD-10-CM

## 2024-12-16 DIAGNOSIS — G89.29 CHRONIC PAIN IN RIGHT SHOULDER: Primary | ICD-10-CM

## 2024-12-16 DIAGNOSIS — G56.20 CUBITAL TUNNEL SYNDROME, UNSPECIFIED LATERALITY: ICD-10-CM

## 2024-12-16 DIAGNOSIS — M25.511 CHRONIC PAIN IN RIGHT SHOULDER: Primary | ICD-10-CM

## 2024-12-16 PROCEDURE — 99999 PR PBB SHADOW E&M-EST. PATIENT-LVL V: CPT | Mod: PBBFAC,,, | Performed by: SURGERY

## 2024-12-16 PROCEDURE — 99213 OFFICE O/P EST LOW 20 MIN: CPT | Mod: S$GLB,,, | Performed by: SURGERY

## 2024-12-16 PROCEDURE — 3074F SYST BP LT 130 MM HG: CPT | Mod: CPTII,S$GLB,, | Performed by: SURGERY

## 2024-12-16 PROCEDURE — 1159F MED LIST DOCD IN RCRD: CPT | Mod: CPTII,S$GLB,, | Performed by: SURGERY

## 2024-12-16 PROCEDURE — 3078F DIAST BP <80 MM HG: CPT | Mod: CPTII,S$GLB,, | Performed by: SURGERY

## 2024-12-16 PROCEDURE — 3008F BODY MASS INDEX DOCD: CPT | Mod: CPTII,S$GLB,, | Performed by: SURGERY

## 2024-12-16 NOTE — PROGRESS NOTES
VASCULAR SURGERY NOTE    Patient ID: Pura Gudino is a 44 y.o. female.    I. HISTORY     Chief Complaint: Thoracic Outlet Syndrome    HPI: Pura Gudino is a 44 y.o. female who is here today for new patient initial appointment. Initially evaluated in 2011 for symptoms. Reports that she has seen 4-5 different physical therapists and has seen a number of medical providers for evaluation. Reports that pain is constant and is located in the right shoulder. Has worsened since August. Pain moves up to the right face and jaw. Feels a hot sensation that moves up to the right side of the face, the right ear, and down the upper arm and the fingers. Starts dropping objects, such as plates and her laptop. Left hand dominant. Has difficulty completing work due to pain and difficulty with driving. Improvement with muscle relaxers, aleve, and heating pads. Has had relief in the past with steroid shots from rheumatologist. Intermittent pain on the left side, but not as extensive as right arm.    Previously played basketball and shotput in track. Previous weight .    Interval Update:   Since most recent visit, patient found to have median nerve compression bilaterally, along with left sided ulnar nerve compression. Cervical spine MRI was also completed which demonstrated C5-C6 central stenosis. She has complaints today of non-localized neck pain, and numbness and tingling in the arms (L>R). She reports that insurance denied her when attempting botox injections that was recommended after her last visit, but that she has worked with physical therapy with minimal improvement.      Past Medical History:   Diagnosis Date    Allergy     Anemia     Asthma     Bipolar disorder, unspecified     Chronic diarrhea     Elevated C-reactive protein (CRP)     Fibromyalgia     MATT (generalized anxiety disorder)     GERD (gastroesophageal reflux disease)     Headache(784.0)     Insomnia     Irritable bowel syndrome     MDD  (major depressive disorder)     Motion sickness     PCOS (polycystic ovarian syndrome)     Seizures     childhood    Seronegative rheumatoid arthritis         Past Surgical History:   Procedure Laterality Date    COLONOSCOPY N/A 12/21/2022    Procedure: COLONOSCOPY;  Surgeon: Mikala Parker MD;  Location: [facility];  Service: Endoscopy;  Laterality: N/A;    HYSTERECTOMY      LASER LAPAROSCOPY      left hip surgery had hardware replaced then removed      SINUS SURGERY       Social History     Occupational History    Occupation: psychologist   Tobacco Use    Smoking status: Never    Smokeless tobacco: Never   Substance and Sexual Activity    Alcohol use: No    Drug use: No    Sexual activity: Yes     Birth control/protection: OCP         Review of Systems   Constitutional: Negative for weight loss.   HENT:  Negative for ear pain and nosebleeds.    Eyes:  Negative for discharge and pain.   Cardiovascular:  Negative for chest pain and palpitations.   Respiratory:  Negative for cough, shortness of breath and wheezing.    Endocrine: Negative for cold intolerance, heat intolerance and polyphagia.   Hematologic/Lymphatic: Negative for adenopathy. Does not bruise/bleed easily.   Skin:  Negative for itching and rash.   Musculoskeletal:  Negative for joint swelling and muscle cramps.   Gastrointestinal:  Negative for abdominal pain, diarrhea, nausea and vomiting.   Genitourinary:  Negative for dysuria and flank pain.   Neurological:  Negative for numbness and seizures.         II. PHYSICAL EXAM     Physical Exam  Constitutional:       General: She is not in acute distress.     Appearance: Normal appearance. She is normal weight. She is not ill-appearing or diaphoretic.   HENT:      Head: Normocephalic and atraumatic.   Eyes:      General: No scleral icterus.        Right eye: No discharge.         Left eye: No discharge.      Extraocular Movements: Extraocular movements intact.      Conjunctiva/sclera: Conjunctivae normal.    Cardiovascular:      Rate and Rhythm: Normal rate and regular rhythm.      Pulses: Normal pulses.   Pulmonary:      Effort: Pulmonary effort is normal. No respiratory distress.   Musculoskeletal:         General: Normal range of motion.   Skin:     General: Skin is warm and dry.      Coloration: Skin is not jaundiced or pale.      Findings: No erythema or rash.   Neurological:      General: No focal deficit present.      Mental Status: She is alert and oriented to person, place, and time.   Psychiatric:         Mood and Affect: Mood normal.         Behavior: Behavior normal.       Arm Abduction:  Left: Normal ROM with no pain  Right: Normal ROM, but patient reports pain of right shoulder and right upper arm    Scalene Triangle palpation:  Left: No tenderness to palpation  Right: No tenderness to palpation at rest, tender to palpation with head rotation to left    Pec Minor Insertion palpation:  Left: negative  Right: + local and + peripheral with resulting tingling sensation down upper arm and into fingers    EAST:  Left: negative  Right: heaviness and tingling in the right arm, no cyanosis notable. Veins appear slightly larger in size. Slightly dampened radial pulse    ULTT:  Left: 2+ radial artery, no change with raising arm  Right: 2+ radial artery, no change with raising arm    QuickDash Score: 43 (previously 56)    III. ASSESSMENT & PLAN (MEDICAL DECISION MAKING)     1. Chronic pain in right shoulder    2. Median nerve compression    3. Cubital tunnel syndrome, unspecified laterality        Imaging Results:  CXR 9/23/24: normal first ribs bilaterally. No cervical rib    Assessment/Diagnosis and Plan:  44 y.o. female who presents for evaluation of thoracic outlet syndrome. Based on further investigation my suspicion is low that she has NTOS. More likely explanation is cervical radiculopathy and median nerve and possibly ulnar compression. If she has persistent symptoms despite addressing these more likely  causes then she may return for additional TOS eval/treatment.  I explained the findings with the patient who expressed understanding and agreed to the below treatment plan.    - EMG demonstrating bilateral CTS and left cubital tunnel syndrome; discussed with pt that we would recommend revisiting her orthopedic hand surgeon to release these areas of compression  - Plan to refer to orthopedic spine for cervical symptoms   - Will be available PRN following previously mentioned interventions if sxs persist or fail to improve.       JAELYN Abel II, MD, VI  Vascular Surgery  [facility]

## 2024-12-19 RX ORDER — CLOBETASOL PROPIONATE 0.5 MG/G
OINTMENT TOPICAL
Qty: 60 G | Refills: 0 | Status: SHIPPED | OUTPATIENT
Start: 2024-12-19

## 2024-12-19 RX ORDER — CLOBETASOL PROPIONATE 0.5 MG/G
OINTMENT TOPICAL
Qty: 60 G | Refills: 0 | Status: CANCELLED | OUTPATIENT
Start: 2024-12-19

## 2024-12-19 NOTE — TELEPHONE ENCOUNTER
No care due was identified.  [facility] Embedded Care Due Messages. Reference number: [id number].   12/19/2024 10:44:05 AM CST

## 2024-12-19 NOTE — TELEPHONE ENCOUNTER
No care due was identified.  [facility] Embedded Care Due Messages. Reference number: [id number].   12/19/2024 5:08:19 AM CST

## 2025-01-02 ENCOUNTER — OFFICE VISIT (OUTPATIENT)
Dept: PAIN MEDICINE | Facility: CLINIC | Age: 45
End: 2025-01-02
Payer: COMMERCIAL

## 2025-01-02 ENCOUNTER — PATIENT MESSAGE (OUTPATIENT)
Dept: VASCULAR SURGERY | Facility: CLINIC | Age: 45
End: 2025-01-02
Payer: COMMERCIAL

## 2025-01-02 VITALS — WEIGHT: 158.75 LBS | BODY MASS INDEX: 24.06 KG/M2 | HEIGHT: 68 IN

## 2025-01-02 DIAGNOSIS — I87.1: Primary | ICD-10-CM

## 2025-01-02 PROCEDURE — 99999 PR PBB SHADOW E&M-EST. PATIENT-LVL IV: CPT | Mod: PBBFAC,,, | Performed by: STUDENT IN AN ORGANIZED HEALTH CARE EDUCATION/TRAINING PROGRAM

## 2025-01-03 NOTE — PROGRESS NOTES
[facility] - Department    Berenice Garrido MD      First Office Visit: 8/28/24  Today' Date: 1/2/2025  Last Office Visit: 11/26/24    Chief complaint: neck pain    HPI: Pt is a pleasant 44 y.o., who presents for evaluation. Referred by self. Pt seen previously for neck pain and bilateral shoulder pain since 2010. Pt seen today for f/u after having CTA showing dynamic compression of bilat subclavian veins at the thoracic inlet and concerning for venous TOS. MR Brachial plexus nml. Pt does continue to endorse pain that starts in the neck and goes down to bilat shoulders/traps that is particularly worse with raising arms. Pt does state she notices the pain particularly when she is shopping. Pt has seen Dr. Peña who did note very positive signs of TOS including positive Winters's and Geovanni test. EMG so far has been remarkable for L CTS and MRI showed some degenerative changes at 5-6. Denies having sharp, shooting pain going down both arms. MR C-spine from 5 yrs ago does show L foraminal stenosis at C5-6. Denies headaches, balance issues, no problems dropping objects. Pt does have f/u appt with Dr. Abel soon. No BB changes. Is doing HEP.         Pain disability index score: 63  Pain score: 3    Relevant Imaging/ Testing:   MR Brachial plexus 12/24 - nml  CT chest angiogram 12/24 - Dynamic compression of the distal left subclavian vein and brachial plexus in the pectoralis minor space. Dynamic compression of both proximal subclavian veins at thoracic inlet  MR R shoulder 9/24 - partial thickness tear of infraspinatus tendon  XR R shoulder 9/24  XR B shoulders 8/24  XR L-spine 10/16  XR C-spine 10/16, 8/24  MR C-spine 9/16, 11/24 - moderate L foraminal narrowing C5-6  MR L-spine 9/16    Procedures: None    Date of board of pharmacy review:1/2/2025  Date of opioid risk screening/ pain psych: None  Date of opioid agreement and consent: None  Date of urine drug screen: None  Date of random pill count: None     was  reviewed today: reviewed, clonazepam use    Prescribed medications: None    See EHR for  PMH, PSH, FH, SH, Medications and Allergy    ROS:  Positive for pain  ROS     PE:  There were no vitals filed for this visit.  General: Pleasant, no distress  HEENT: NC/ AT. PERRLA  CV: Radial pulses intact  Pulm: No distress  Ext: No edema    Physical Exam     Neuromusculoskeletal:  Head: NC, AT. PERRLA. No occipital tenderness  Neck: Intact range of motions, extension, flexion, rotation. Bilat Facet loading. Neg Spurling. Min Tenderness.  5/5 Strength, normal tone. Neg Brady's  Shoulder: Intact range of motion. Min Bicep groove tenderness. 5/5 Strength. Pos DAVIS's   Lumbar: Intact range of motion  Hip: Intact range of motion  SI: Level  Knee: Intact range of motion  Reflexes: normal Bicep  Strength: 5/5 globally   Sensory: decreased sensation of R arm   Skin: No bruising, erythema  Gait: Normal      Impression:  Neck pain  Bilateral shoulder pain  Relevant History  BMI 23.81  Bipolar  Anxiety  Fibromyalgia     Assessment:  Venous TOS - CTA showing dynamic compression of proximal subclavian veins at thoracic inlet   Cervical radiculopathy - L foraminal narrowing C5-6   R shoulder rotator cuff tear  B CTS  L cubital tunnel syndrome       Plan:  Discussed options  Imaging/ relevant records viewed/ reviewed/ discussed  Imaging results viewed and reviewed (noted above)/ reviewed with patient   reviewed  Cont HEP  Consider anterior scalene botox for venous TOS should Dr. Abel feel pt would benefit from procedure   Pt to f/u with Dr. Abel - will f/u recs   Re-eval after  Consider ILESI C7-T1 for L cervical radiculopathy C5-6   Cont care with Dr. Peña for shoulder          Prescribed medications:  1. None    The impression and plan were discussed and explained in detail. All the questions were answered. Education was provided accordingly.     The procedure was explained in detail, along with risks and potential side  effects.        Follow-up:  Pt to schedule     Mi Chloe Huerta MD

## 2025-01-06 ENCOUNTER — OFFICE VISIT (OUTPATIENT)
Dept: VASCULAR SURGERY | Facility: CLINIC | Age: 45
End: 2025-01-06
Attending: SURGERY
Payer: COMMERCIAL

## 2025-01-06 VITALS
BODY MASS INDEX: 24.39 KG/M2 | TEMPERATURE: 98 F | DIASTOLIC BLOOD PRESSURE: 77 MMHG | HEIGHT: 68 IN | HEART RATE: 87 BPM | WEIGHT: 160.94 LBS | SYSTOLIC BLOOD PRESSURE: 113 MMHG

## 2025-01-06 DIAGNOSIS — G54.0 NEUROGENIC THORACIC OUTLET SYNDROME OF LEFT BRACHIAL PLEXUS: Primary | ICD-10-CM

## 2025-01-06 PROCEDURE — 1159F MED LIST DOCD IN RCRD: CPT | Mod: CPTII,S$GLB,, | Performed by: SURGERY

## 2025-01-06 PROCEDURE — 3008F BODY MASS INDEX DOCD: CPT | Mod: CPTII,S$GLB,, | Performed by: SURGERY

## 2025-01-06 PROCEDURE — 99214 OFFICE O/P EST MOD 30 MIN: CPT | Mod: S$GLB,,, | Performed by: SURGERY

## 2025-01-06 PROCEDURE — 3078F DIAST BP <80 MM HG: CPT | Mod: CPTII,S$GLB,, | Performed by: SURGERY

## 2025-01-06 PROCEDURE — 3074F SYST BP LT 130 MM HG: CPT | Mod: CPTII,S$GLB,, | Performed by: SURGERY

## 2025-01-06 PROCEDURE — 99999 PR PBB SHADOW E&M-EST. PATIENT-LVL V: CPT | Mod: PBBFAC,,, | Performed by: SURGERY

## 2025-01-06 NOTE — PROGRESS NOTES
VASCULAR SURGERY NOTE    Patient ID: Pura Gudino is a 44 y.o. female.    I. HISTORY     Chief Complaint: Thoracic Outlet Syndrome    HPI: Pura Gudino is a 44 y.o. female who is here today for new patient initial appointment. Initially evaluated in 2011 for symptoms. Reports that she has seen 4-5 different physical therapists and has seen a number of medical providers for evaluation. Reports that pain is constant and is located in the left shoulder. Has worsened since August. Pain moves up to the left face and jaw. Feels a hot sensation that moves up to the left side of the face, the left ear, and down the upper arm and the fingers. Starts dropping objects, such as plates and her laptop. Left hand dominant. Has difficulty completing work due to pain and difficulty with driving. Improvement with muscle relaxers, aleve, and heating pads. Has had relief in the past with steroid shots from rheumatologist. Intermittent pain on the right side, but not as extensive as left.    Previously played basketball and shotput in track. Previous weight .    Interval Update 12/16/2024:   Since most recent visit, patient found to have median nerve compression bilaterally, along with left sided ulnar nerve compression. Cervical spine MRI was also completed which demonstrated C5-C6 central stenosis. She has complaints today of non-localized neck pain, and numbness and tingling in the arms (L>R). She reports that insurance denied her when attempting botox injections that was recommended after her last visit, but that she has worked with physical therapy with minimal improvement.      HPI 1/6/25:  Symptoms have remained the same since her visit on 12/16. Pain seems to originate in back of neck and radiate to shoulders. Endorses some tingling and numbness in arms. Generally, her left side is worse than right. She has had more imaging and studies at [facility] since her last visit. She states that the most disabling  symptom for her is the pain in her shoulder.  Pain is most significant in her left shoulder.  She had a CTA performed when she was evaluated at the [facility] which per report showed severe subclavian vein stenosis bilaterally.  Of note this CTA was performed with her arms up.    Past Medical History:   Diagnosis Date    Allergy     Anemia     Asthma     Bipolar disorder, unspecified     Chronic diarrhea     Elevated C-reactive protein (CRP)     Fibromyalgia     MATT (generalized anxiety disorder)     GERD (gastroesophageal reflux disease)     Headache(784.0)     Insomnia     Irritable bowel syndrome     MDD (major depressive disorder)     Motion sickness     PCOS (polycystic ovarian syndrome)     Seizures     childhood    Seronegative rheumatoid arthritis         Past Surgical History:   Procedure Laterality Date    COLONOSCOPY N/A 12/21/2022    Procedure: COLONOSCOPY;  Surgeon: Mikala Parker MD;  Location: [facility];  Service: Endoscopy;  Laterality: N/A;    HYSTERECTOMY      LASER LAPAROSCOPY      left hip surgery had hardware replaced then removed      SINUS SURGERY       Social History     Occupational History    Occupation: psychologist   Tobacco Use    Smoking status: Never    Smokeless tobacco: Never   Substance and Sexual Activity    Alcohol use: No    Drug use: No    Sexual activity: Yes     Birth control/protection: OCP         Review of Systems   Constitutional: Negative for weight loss.   HENT:  Negative for ear pain and nosebleeds.    Eyes:  Negative for discharge and pain.   Cardiovascular:  Negative for chest pain and palpitations.   Respiratory:  Negative for cough, shortness of breath and wheezing.    Endocrine: Negative for cold intolerance, heat intolerance and polyphagia.   Hematologic/Lymphatic: Negative for adenopathy. Does not bruise/bleed easily.   Skin:  Negative for itching and rash.   Musculoskeletal:  Negative for joint swelling and muscle cramps.   Gastrointestinal:  Negative for  abdominal pain, diarrhea, nausea and vomiting.   Genitourinary:  Negative for dysuria and flank pain.   Neurological:  Negative for numbness and seizures.         II. PHYSICAL EXAM     Physical Exam  Constitutional:       General: She is not in acute distress.     Appearance: Normal appearance. She is normal weight. She is not ill-appearing or diaphoretic.   HENT:      Head: Normocephalic and atraumatic.   Eyes:      General: No scleral icterus.        Right eye: No discharge.         Left eye: No discharge.      Extraocular Movements: Extraocular movements intact.      Conjunctiva/sclera: Conjunctivae normal.   Cardiovascular:      Rate and Rhythm: Normal rate and regular rhythm.      Pulses: Normal pulses.   Pulmonary:      Effort: Pulmonary effort is normal. No respiratory distress.   Musculoskeletal:         General: Normal range of motion.   Skin:     General: Skin is warm and dry.      Coloration: Skin is not jaundiced or pale.      Findings: No erythema or rash.   Neurological:      General: No focal deficit present.      Mental Status: She is alert and oriented to person, place, and time.   Psychiatric:         Mood and Affect: Mood normal.         Behavior: Behavior normal.       Initial TOS Exam:  Arm Abduction:  Left: Normal ROM with no pain  Right: Normal ROM, but patient reports pain of right shoulder and right upper arm    Scalene Triangle palpation:  Left: tender to palpation with head rotation to left, No tenderness to palpation  Right: No tenderness to palpation at rest,     Pec Minor Insertion palpation:  Left: local and + peripheral with resulting tingling sensation down upper arm and into fingers  Right: negative    EAST:  Left: able to complete 2min, no color change     Right:  able to complete 2 min, no color change    ULTT:  Left: 2+ radial artery, no change with raising arm  Right: 2+ radial artery, no change with raising arm    QuickDash Score: 50 (previuosly 43) (previously 56)        III.  ASSESSMENT & PLAN (MEDICAL DECISION MAKING)     1. Neurogenic thoracic outlet syndrome of left brachial plexus          Imaging Results:  CXR 9/23/24: normal first ribs bilaterally. No cervical rib    Assessment/Diagnosis and Plan:  44 y.o. female who presents for evaluation of thoracic outlet syndrome.  She had repeat EMG at [place] which only showed mild median nerve compression.  This would not explain her significant shoulder neck and upper back symptoms. I discussed the read of her CTA in detail.  This can be found in her records.  I explained that many patients have compression of the subclavian vein which is asymptomatic.  For her this is asymptomatic.  She has no venous hypertension when elevating her arms and using them.  She has no enlarged chest wall collaterals.  She has never had a DVT.  She has no swelling of the arm.  She is asymptomatic by every measure from a venous standpoint.   The most likely explanation for her symptoms is possibly neurogenic thoracic outlet syndrome involving the left brachial plexus.  She may also have neurogenic pec minor syndrome.  I will refer her to pain management at [facility] for left anterior scalene muscle block.    - referral to [facility] pain management for LEFT anterior scalene muscle block to diagnose neurogenic thoracic outlet syndrome      JAELYN Abel II, MD, [facility]  Vascular Surgery  [facility]

## 2025-01-10 DIAGNOSIS — G54.0 NEUROGENIC THORACIC OUTLET SYNDROME OF LEFT BRACHIAL PLEXUS: Primary | ICD-10-CM

## 2025-01-14 ENCOUNTER — OFFICE VISIT (OUTPATIENT)
Dept: FAMILY MEDICINE | Facility: CLINIC | Age: 45
End: 2025-01-14
Payer: COMMERCIAL

## 2025-01-14 VITALS
OXYGEN SATURATION: 98 % | DIASTOLIC BLOOD PRESSURE: 78 MMHG | BODY MASS INDEX: 25.11 KG/M2 | WEIGHT: 165.13 LBS | SYSTOLIC BLOOD PRESSURE: 118 MMHG | HEART RATE: 78 BPM

## 2025-01-14 DIAGNOSIS — Z00.00 ANNUAL PHYSICAL EXAM: Primary | ICD-10-CM

## 2025-01-14 DIAGNOSIS — I10 ESSENTIAL HYPERTENSION: ICD-10-CM

## 2025-01-14 PROCEDURE — 3074F SYST BP LT 130 MM HG: CPT | Mod: CPTII,S$GLB,, | Performed by: INTERNAL MEDICINE

## 2025-01-14 PROCEDURE — 1159F MED LIST DOCD IN RCRD: CPT | Mod: CPTII,S$GLB,, | Performed by: INTERNAL MEDICINE

## 2025-01-14 PROCEDURE — 3008F BODY MASS INDEX DOCD: CPT | Mod: CPTII,S$GLB,, | Performed by: INTERNAL MEDICINE

## 2025-01-14 PROCEDURE — 3078F DIAST BP <80 MM HG: CPT | Mod: CPTII,S$GLB,, | Performed by: INTERNAL MEDICINE

## 2025-01-14 PROCEDURE — 99999 PR PBB SHADOW E&M-EST. PATIENT-LVL IV: CPT | Mod: PBBFAC,,, | Performed by: INTERNAL MEDICINE

## 2025-01-14 PROCEDURE — 99396 PREV VISIT EST AGE 40-64: CPT | Mod: S$GLB,,, | Performed by: INTERNAL MEDICINE

## 2025-01-14 RX ORDER — PROPRANOLOL HYDROCHLORIDE 80 MG/1
CAPSULE, EXTENDED RELEASE ORAL
Qty: 90 CAPSULE | Refills: 3 | Status: SHIPPED | OUTPATIENT
Start: 2025-01-14

## 2025-01-14 NOTE — PROGRESS NOTES
Subjective     Patient ID: Pura Gudino is a 44 y.o. female.    Chief Complaint: Medication Refill    Here for annual.  Utd labs. Utd mmg. Nonsmoker.      Medication Refill  Associated symptoms include arthralgias, headaches, joint swelling and neck pain. Pertinent negatives include no chest pain, vomiting or weakness.     Review of Systems   Constitutional:  Negative for activity change and unexpected weight change.   HENT:  Positive for hearing loss. Negative for rhinorrhea and trouble swallowing.    Eyes:  Negative for discharge and visual disturbance.   Respiratory:  Negative for chest tightness and wheezing.    Cardiovascular:  Negative for chest pain and palpitations.   Gastrointestinal:  Negative for blood in stool, constipation, diarrhea and vomiting.   Endocrine: Negative for polydipsia and polyuria.   Genitourinary:  Negative for difficulty urinating, dysuria, hematuria and menstrual problem.   Musculoskeletal:  Positive for arthralgias, joint swelling and neck pain.   Neurological:  Positive for headaches. Negative for weakness.   Psychiatric/Behavioral:  Negative for confusion and dysphoric mood.           Objective     Physical Exam  Constitutional:       Appearance: Normal appearance.   HENT:      Head: Normocephalic.   Cardiovascular:      Rate and Rhythm: Normal rate and regular rhythm.   Pulmonary:      Effort: Pulmonary effort is normal.      Breath sounds: Normal breath sounds.   Musculoskeletal:         General: Normal range of motion.      Cervical back: Normal range of motion.   Neurological:      General: No focal deficit present.      Mental Status: She is alert.   Psychiatric:         Mood and Affect: Mood normal.         Behavior: Behavior normal.            Assessment and Plan     1. Annual physical exam    2. Essential hypertension  -     propranoloL (INDERAL LA) 80 MG 24 hr capsule; TAKE 1 CAPSULE(80 MG) BY MOUTH EVERY NIGHT  Dispense: 90 capsule; Refill: 3        Annual- utd ,  nonsmoker, exercising         No follow-ups on file.

## 2025-01-29 RX ORDER — CLOBETASOL PROPIONATE 0.5 MG/G
OINTMENT TOPICAL
Qty: 60 G | Refills: 3 | Status: SHIPPED | OUTPATIENT
Start: 2025-01-29 | End: 2025-01-29 | Stop reason: CLARIF

## 2025-01-29 RX ORDER — CLOBETASOL PROPIONATE 0.5 MG/G
OINTMENT TOPICAL
Qty: 60 G | Refills: 0 | Status: SHIPPED | OUTPATIENT
Start: 2025-01-29

## 2025-01-29 NOTE — TELEPHONE ENCOUNTER
Refill Decision Note   Pura Gudino  is requesting a refill authorization.  Brief Assessment and Rationale for Refill:  Approve     Medication Therapy Plan:         Comments:     Note composed:8:08 AM 01/29/2025

## 2025-01-29 NOTE — TELEPHONE ENCOUNTER
No care due was identified.  Health [facility] Embedded Care Due Messages. Reference number: [id number].   1/29/2025 5:08:37 AM CST

## 2025-02-10 ENCOUNTER — OFFICE VISIT (OUTPATIENT)
Dept: OTOLARYNGOLOGY | Facility: CLINIC | Age: 45
End: 2025-02-10
Payer: COMMERCIAL

## 2025-02-10 VITALS — BODY MASS INDEX: 25.11 KG/M2 | HEIGHT: 68 IN

## 2025-02-10 DIAGNOSIS — H93.12 TINNITUS, LEFT: Primary | ICD-10-CM

## 2025-02-10 DIAGNOSIS — M26.609 TMJ (TEMPOROMANDIBULAR JOINT SYNDROME): ICD-10-CM

## 2025-02-10 DIAGNOSIS — H69.92 ETD (EUSTACHIAN TUBE DYSFUNCTION), LEFT: ICD-10-CM

## 2025-02-10 PROCEDURE — 3008F BODY MASS INDEX DOCD: CPT | Mod: CPTII,S$GLB,, | Performed by: STUDENT IN AN ORGANIZED HEALTH CARE EDUCATION/TRAINING PROGRAM

## 2025-02-10 PROCEDURE — 99999 PR PBB SHADOW E&M-EST. PATIENT-LVL III: CPT | Mod: PBBFAC,,, | Performed by: STUDENT IN AN ORGANIZED HEALTH CARE EDUCATION/TRAINING PROGRAM

## 2025-02-10 PROCEDURE — 99213 OFFICE O/P EST LOW 20 MIN: CPT | Mod: S$GLB,,, | Performed by: STUDENT IN AN ORGANIZED HEALTH CARE EDUCATION/TRAINING PROGRAM

## 2025-02-10 PROCEDURE — 1159F MED LIST DOCD IN RCRD: CPT | Mod: CPTII,S$GLB,, | Performed by: STUDENT IN AN ORGANIZED HEALTH CARE EDUCATION/TRAINING PROGRAM

## 2025-02-10 NOTE — PROGRESS NOTES
Chief complaint:   Chief Complaint   Patient presents with    Ear Fullness     L ear fullness and ruptured ear drum. Pain comes and goes. Patient c/o L ear itching. She states she had a rash last week.          Referring Provider:  Self, Aaareferral  No address on file    History of Present Illness:     Ms. Gudino is a 44 y.o. female presenting for evaluation of left ear fullness and pain. Onset of this chief complaint was about 2 years ago.  Additional symptoms that also have been associated are popping (which can sometimes help her hearing), tinnitus (left), itching/rash on external a week ago that resolved. The patient denies drainage, vertigo.  Treatment has included flonase with no relief.     Pt had positive allergy to ragweed, pollen, dust mites, mold. Pt had sinus surgery at 13.     The patient denies significant hearing loss risk factors, ototoxic medication exposure, chronic vestibular suppressant use, head/ facial/ jil trauma, and otologic surgery.        History        Past Medical History:   Past Medical History:   Diagnosis Date    Allergy     Anemia     Asthma     Bipolar disorder, unspecified     Chronic diarrhea     Elevated C-reactive protein (CRP)     Fibromyalgia     MATT (generalized anxiety disorder)     GERD (gastroesophageal reflux disease)     Headache(784.0)     Insomnia     Irritable bowel syndrome     MDD (major depressive disorder)     Motion sickness     PCOS (polycystic ovarian syndrome)     Seizures     childhood    Seronegative rheumatoid arthritis     .          Past Surgical History:  Past Surgical History:   Procedure Laterality Date    COLONOSCOPY N/A 12/21/2022    Procedure: COLONOSCOPY;  Surgeon: Mikala Parker MD;  Location: [facility];  Service: Endoscopy;  Laterality: N/A;    HYSTERECTOMY      LASER LAPAROSCOPY      left hip surgery had hardware replaced then removed      SINUS SURGERY     .         Medications: Medication list was reviewed. She  has a current medication  list which includes the following prescription(s): albuterol, bupropion, bupropion, bupropion, bupropion, clobetasol 0.05%, clobetasol 0.05%, clonazepam, clonazepam, clonazepam, clonazepam, clonazepam, dupixent pen, enbrel sureclick, fluticasone-salmeterol 250-50 mcg/dose, ajovy autoinjector, nebulizer accessories, propranolol, tizanidine, topiramate, topiramate, venlafaxine, venlafaxine, and venlafaxine.         Allergies:   Review of patient's allergies indicates:   Allergen Reactions    Pertussis vaccines     Decongestant allergy Palpitations            Family history: family history includes Alcohol abuse in her maternal aunt and maternal uncle; Asthma in her maternal aunt; Bipolar disorder in her maternal uncle and another family member; Cataracts in her maternal grandfather and mother; Depression in her maternal aunt; Diabetes Mellitus in her maternal aunt; Heart disease in her maternal aunt; Heart failure in her maternal uncle; Rheum arthritis in her maternal grandmother.         Social History          Alcohol use:  reports no history of alcohol use.            Tobacco:  reports that she has never smoked. She has never used smokeless tobacco.         Please see the patient's intake form for full details of past medical history, past surgical history, family history, social history and review of systems. ?This information was reviewed by me and noted.      Physical Examination     General: Well developed, well nourished, well hydrated. Verbal with a strong voice and not dysphonic.     Head/Face: Normocephalic, atraumatic. No scars or lesions. Facial musculature equal. Creptius at L TMJ which does recreate her described popping sound/sensatoin    Eyes: No scleral icterus or conjunctival hemorrhage. EOMI. PERRLA.     Ears:     Right ear: No gross deformity. EAC is clear of debris and erythema. The TM is intact with a pneumatized middle ear. No signs of retraction, fluid or infection.      Left ear: No gross  deformity. EAC is clear of debris and erythema. The TM is intact with a pneumatized middle ear. No signs of retraction, fluid or infection.     Hearing: grossly intact    Nose: No gross deformity or lesions. No purulent discharge. No significant NSD.      Mouth/Oropharynx: Lips without any lesions. No mucosal lesions within the oropharynx. No tonsillar exudate or lesions. Pharyngeal walls symmetrical. Uvula midline. Tongue midline without lesions. Open bite.     Neck: Trachea midline. No masses. No thyromegaly or nodules palpated.     Lymphatic: No lymphadenopathy in the neck.     Extremities: No cyanosis. Warm and well-perfused.     Skin: No scars or lesions on face or neck.      Neurologic: Moving all extremities without gross abnormality.CN II-XII grossly intact. House-Brackmann 1/6. No signs of nystagmus.      Psych: Alert and oriented to person, place, and time with an appropriate mood and affect.     Data review:    Review of records:      I reviewed records from the referring provider's office visits.  These describe the history, workup, and/or treatment of this problem thus far.        Assessment/Plan:      1. Tinnitus, left    2. ETD (Eustachian tube dysfunction), left    3. TMJ (temporomandibular joint syndrome)         There is some overlpaping symptoms of TMJ and Eustachian Tube Dysfunction. Creptius at L TMJ which does recreate her described popping sound/sensation, but she does also have have more classic Eustachian Tube Dysfunction popping/crackling. I would recommend TMJ specialist for her TMJ and audio/tymps. If there is concurrent Eustachian Tube Dysfunction/Conductive Hearing Loss we may consider PET or ET dilation.        Sam Daniel MD  [facility]   [facility] [address] [place] [address]  P: [phone]  F: [phone]

## 2025-02-12 ENCOUNTER — TELEPHONE (OUTPATIENT)
Dept: PAIN MEDICINE | Facility: CLINIC | Age: 45
End: 2025-02-12
Payer: COMMERCIAL

## 2025-02-12 ENCOUNTER — OFFICE VISIT (OUTPATIENT)
Dept: PAIN MEDICINE | Facility: CLINIC | Age: 45
End: 2025-02-12
Payer: COMMERCIAL

## 2025-02-12 VITALS
HEIGHT: 68 IN | SYSTOLIC BLOOD PRESSURE: 103 MMHG | BODY MASS INDEX: 24.72 KG/M2 | HEART RATE: 78 BPM | OXYGEN SATURATION: 100 % | RESPIRATION RATE: 18 BRPM | DIASTOLIC BLOOD PRESSURE: 72 MMHG | TEMPERATURE: 98 F | WEIGHT: 163.13 LBS

## 2025-02-12 DIAGNOSIS — I87.1: Primary | ICD-10-CM

## 2025-02-12 DIAGNOSIS — G54.0 NEUROGENIC THORACIC OUTLET SYNDROME OF LEFT BRACHIAL PLEXUS: Primary | ICD-10-CM

## 2025-02-12 DIAGNOSIS — G54.0 NEUROGENIC THORACIC OUTLET SYNDROME OF LEFT BRACHIAL PLEXUS: ICD-10-CM

## 2025-02-12 DIAGNOSIS — G54.0 NEUROGENIC THORACIC OUTLET SYNDROME OF RIGHT BRACHIAL PLEXUS: ICD-10-CM

## 2025-02-12 NOTE — PROGRESS NOTES
[place] - Department    Berenice Garrido MD      First Office Visit: 8/28/24  Today' Date: 2/12/2025  Last Office Visit: 11/26/24    Chief complaint: neck pain    HPI: Pt is a pleasant 44 y.o., who presents for evaluation. Referred by self. Pt seen previously for neck pain and bilateral shoulder pain since 2010. Pt seen today for f/u after having CTA showing dynamic compression of bilat subclavian veins at the thoracic inlet and concerning for venous TOS. MR Brachial plexus nml. Pt does continue to endorse pain that starts in the neck and goes down to bilat shoulders/traps that is particularly worse with raising arms. Pt does state she notices the pain particularly when she is shopping. Pt has seen Dr. Peña who did note very positive signs of TOS including positive Winters's and Geovanni test. EMG so far has been remarkable for L CTS and MRI showed some degenerative changes at 5-6. Denies having sharp, shooting pain going down both arms. MR C-spine from 5 yrs ago does show L foraminal stenosis at C5-6. Denies headaches, balance issues, no problems dropping objects. Pt does have f/u appt with Dr. Abel soon. No BB changes. Is doing HEP.         Pain disability index score: 63  Pain score: 3    Relevant Imaging/ Testing:   MR Brachial plexus 12/24 - nml  CT chest angiogram 12/24 - Dynamic compression of the distal left subclavian vein and brachial plexus in the pectoralis minor space. Dynamic compression of both proximal subclavian veins at thoracic inlet  MR R shoulder 9/24 - partial thickness tear of infraspinatus tendon  XR R shoulder 9/24  XR B shoulders 8/24  XR L-spine 10/16  XR C-spine 10/16, 8/24  MR C-spine 9/16, 11/24 - moderate L foraminal narrowing C5-6  MR L-spine 9/16    Procedures: None    Date of board of pharmacy review:2/12/2025  Date of opioid risk screening/ pain psych: None  Date of opioid agreement and consent: None  Date of urine drug screen: None  Date of random pill count: None      was reviewed today: reviewed, clonazepam use    Prescribed medications: None    See EHR for  PMH, PSH, FH, SH, Medications and Allergy    ROS:  Positive for pain  ROS     PE:  Vitals:    02/12/25 1518   BP: 103/72   Pulse: 78   Resp: 18   Temp: 98 °F (36.7 °C)     General: Pleasant, no distress  HEENT: NC/ AT. PERRLA  CV: Radial pulses intact  Pulm: No distress  Ext: No edema    Physical Exam     Neuromusculoskeletal:  Head: NC, AT. PERRLA. No occipital tenderness  Neck: Intact range of motions, extension, flexion, rotation. Bilat Facet loading. Neg Spurling. Min Tenderness.  5/5 Strength, normal tone. Neg Brady's  Shoulder: Intact range of motion. Min Bicep groove tenderness. 5/5 Strength. Pos DAVIS's   Lumbar: Intact range of motion  Hip: Intact range of motion  SI: Level  Knee: Intact range of motion  Reflexes: normal Bicep  Strength: 5/5 globally   Sensory: decreased sensation of R arm   Skin: No bruising, erythema  Gait: Normal      Impression:  Neck pain  Bilateral shoulder pain  Relevant History  BMI 23.81  Bipolar  Anxiety  Fibromyalgia     Assessment:  Venous TOS - CTA showing dynamic compression of proximal subclavian veins at thoracic inlet   Cervical radiculopathy - L foraminal narrowing C5-6   R shoulder rotator cuff tear  B CTS  L cubital tunnel syndrome       Plan:  Discussed options  Imaging/ relevant records viewed/ reviewed/ discussed  Imaging results viewed and reviewed (noted above)/ reviewed with patient   reviewed  Cont HEP  Consider anterior scalene botox for venous TOS should Dr. Abel feel pt would benefit from procedure   Pt to f/u with Dr. Abel - will f/u recs   Re-eval after  Consider ILESI C7-T1 for L cervical radiculopathy C5-6   Cont care with Dr. Peña for shoulder          Prescribed medications:  1. None    The impression and plan were discussed and explained in detail. All the questions were answered. Education was provided accordingly.     The procedure was  explained in detail, along with risks and potential side effects.        Follow-up:  Pt to schedule     Jose Faria MD

## 2025-02-18 ENCOUNTER — OFFICE VISIT (OUTPATIENT)
Dept: FAMILY MEDICINE | Facility: CLINIC | Age: 45
End: 2025-02-18
Payer: COMMERCIAL

## 2025-02-18 VITALS
HEART RATE: 78 BPM | SYSTOLIC BLOOD PRESSURE: 100 MMHG | OXYGEN SATURATION: 99 % | HEIGHT: 68 IN | DIASTOLIC BLOOD PRESSURE: 58 MMHG | BODY MASS INDEX: 24.81 KG/M2

## 2025-02-18 DIAGNOSIS — Z79.899 DRUG-INDUCED IMMUNODEFICIENCY: ICD-10-CM

## 2025-02-18 DIAGNOSIS — U07.1 COVID-19 VIRUS DETECTED: ICD-10-CM

## 2025-02-18 DIAGNOSIS — U07.1 COVID-19 VIRUS INFECTION: Primary | ICD-10-CM

## 2025-02-18 DIAGNOSIS — J02.9 SORE THROAT: ICD-10-CM

## 2025-02-18 DIAGNOSIS — D84.821 DRUG-INDUCED IMMUNODEFICIENCY: ICD-10-CM

## 2025-02-18 LAB
CTP QC/QA: YES
MOLECULAR STREP A: NEGATIVE
POC MOLECULAR INFLUENZA A AGN: NEGATIVE
POC MOLECULAR INFLUENZA B AGN: NEGATIVE
SARS-COV-2 RDRP RESP QL NAA+PROBE: POSITIVE

## 2025-02-18 PROCEDURE — 99214 OFFICE O/P EST MOD 30 MIN: CPT | Mod: S$GLB,,, | Performed by: PHYSICIAN ASSISTANT

## 2025-02-18 PROCEDURE — 99999 PR PBB SHADOW E&M-EST. PATIENT-LVL IV: CPT | Mod: PBBFAC,,, | Performed by: PHYSICIAN ASSISTANT

## 2025-02-18 RX ORDER — NIRMATRELVIR AND RITONAVIR 300-100 MG
KIT ORAL
Qty: 30 TABLET | Refills: 0 | Status: SHIPPED | OUTPATIENT
Start: 2025-02-18 | End: 2025-02-23

## 2025-02-18 NOTE — PROGRESS NOTES
"Subjective:      Patient ID: Pura Gudino is a 45 y.o. female.    Chief Complaint: Sore Throat    Patient is new to me.    Patient's active medical problems include Bipolar disorder, RA, IBS, GERD, and fibromyalgia.    Patient reports sore throat, myalgias, and subjective fever since Sunday.  Taken Bill aspirin without relief.  Sick contacts with strep.    Sore Throat   This is a new problem. The current episode started yesterday. The problem has been rapidly worsening. Neither side of throat is experiencing more pain than the other. There has been no fever. The pain is at a severity of 6/10. The pain is moderate. Associated symptoms include congestion, coughing, ear pain, headaches, a hoarse voice, a plugged ear sensation, neck pain, shortness of breath and stridor. Pertinent negatives include no abdominal pain, diarrhea, drooling, ear discharge, swollen glands, trouble swallowing or vomiting. She has had exposure to strep. She has tried NSAIDs and oral narcotic analgesics for the symptoms. The treatment provided mild relief.     Review of Systems   Constitutional:  Positive for appetite change, chills and fever.   HENT:  Positive for congestion, ear pain, hoarse voice, sinus pressure, sinus pain (max) and sore throat. Negative for drooling, ear discharge and trouble swallowing.    Respiratory:  Positive for cough, shortness of breath and stridor.    Cardiovascular:  Negative for chest pain.   Gastrointestinal:  Negative for abdominal pain, diarrhea, nausea and vomiting.   Musculoskeletal:  Positive for myalgias and neck pain.   Neurological:  Positive for headaches.       Objective:   BP (!) 100/58   Pulse 78   Ht 5' 8" (1.727 m)   LMP 05/23/2018   SpO2 99%   BMI 24.81 kg/m²     Physical Exam  Vitals reviewed.   Constitutional:       Appearance: Normal appearance. She is well-developed. She is ill-appearing.   HENT:      Head: Normocephalic and atraumatic.      Right Ear: Hearing, tympanic membrane, " ear canal and external ear normal.      Left Ear: Hearing, tympanic membrane, ear canal and external ear normal.      Nose:      Right Sinus: No maxillary sinus tenderness or frontal sinus tenderness.      Left Sinus: No maxillary sinus tenderness or frontal sinus tenderness.      Mouth/Throat:      Lips: Pink.      Pharynx: Oropharynx is clear.   Eyes:      General: Lids are normal.      Conjunctiva/sclera: Conjunctivae normal.   Cardiovascular:      Rate and Rhythm: Normal rate and regular rhythm.      Heart sounds: Normal heart sounds. No murmur heard.     No friction rub. No gallop.   Pulmonary:      Effort: Pulmonary effort is normal. No respiratory distress.      Breath sounds: Normal breath sounds. No decreased breath sounds, wheezing, rhonchi or rales.   Musculoskeletal:         General: Normal range of motion.   Lymphadenopathy:      Cervical: No cervical adenopathy.   Skin:     General: Skin is warm and dry.      Findings: No rash.   Neurological:      General: No focal deficit present.      Mental Status: She is alert and oriented to person, place, and time.   Psychiatric:         Mood and Affect: Mood normal.         Behavior: Behavior normal.         Judgment: Judgment normal.       Assessment:      1. COVID-19 virus infection    2. Sore throat    3. Drug-induced immunodeficiency       Plan:   1. COVID-19 virus infection (Primary)  - nirmatrelvir-ritonavir (PAXLOVID) 300 mg (150 mg x 2)-100 mg copackaged tablets (EUA); Take 3 tablets by mouth 2 (two) times daily. Each dose contains 2 nirmatrelvir (pink tablets) and 1 ritonavir (white tablet). Take all 3 tablets together  Dispense: 30 tablet; Refill: 0    2. Sore throat  - POCT Influenza A/B Molecular  - POCT COVID-19 Rapid Screening  - POCT Strep A, Molecular    3. Drug-induced immunodeficiency  Continued.    Follow up as needed.  Patient agreed with plan and expressed understanding.    Thank you for allowing me to serve you,

## 2025-02-18 NOTE — LETTER
February 18, 2025    Pura Gudino  [address] [facility] [address]             [facility]  [address]  [facility] [address]  Phone: [phone]  Fax: [phone]   February 18, 2025     Patient: Pura Gudino   YOB: 1980   Date of Visit: 2/18/2025       To Whom it May Concern:    Pura Gudino was seen in my clinic on 2/18/2025. She may return to work on 2/24/2025 .    Please excuse her from any classes or work missed.    If you have any questions or concerns, please don't hesitate to call.    Sincerely,          Josselyn Huang PA-C

## 2025-02-28 RX ORDER — CLOBETASOL PROPIONATE 0.5 MG/G
OINTMENT TOPICAL
Qty: 60 G | Refills: 2 | Status: SHIPPED | OUTPATIENT
Start: 2025-02-28

## 2025-02-28 NOTE — TELEPHONE ENCOUNTER
Refill Routing Note   Medication(s) are not appropriate for processing by [facility] for the following reason(s):        New or recently adjusted medication    [facility] action(s):  Defer               Appointments  past 12m or future 3m with PCP    Date Provider   Last Visit   1/14/2025 Berenice Garrido MD   Next Visit   Visit date not found Berenice Garrido MD   ED visits in past 90 days: 0        Note composed:10:02 AM 02/28/2025

## 2025-02-28 NOTE — TELEPHONE ENCOUNTER
No care due was identified.  [facility] Embedded Care Due Messages. Reference number: [id number].   2/28/2025 5:08:29 AM CST

## 2025-03-17 RX ORDER — TOPIRAMATE 100 MG/1
TABLET, FILM COATED ORAL
Qty: 30 TABLET | Refills: 1 | Status: SHIPPED | OUTPATIENT
Start: 2025-03-17

## 2025-03-18 ENCOUNTER — PROCEDURE VISIT (OUTPATIENT)
Dept: PAIN MEDICINE | Facility: CLINIC | Age: 45
End: 2025-03-18
Payer: COMMERCIAL

## 2025-03-18 DIAGNOSIS — G54.0 NEUROGENIC THORACIC OUTLET SYNDROME OF LEFT BRACHIAL PLEXUS: Primary | ICD-10-CM

## 2025-03-18 PROCEDURE — 99499 UNLISTED E&M SERVICE: CPT | Mod: S$GLB,,, | Performed by: ANESTHESIOLOGY

## 2025-03-18 RX ORDER — NAPROXEN SODIUM 220 MG
220 TABLET ORAL
COMMUNITY

## 2025-03-18 NOTE — LETTER
March 18, 2025      [facility]  [address]  [facility] [address]  Phone: [phone]  Fax: [phone]       Patient: Pura Gudino   YOB: 1980  Date of Visit: 03/18/2025    To Whom It May Concern:    Angelo Gudino  was at [facility] on 03/18/2025. The patient may return to work/school on Tuesday, March 19, 2025.  with no restrictions. If you have any questions or concerns, or if I can be of further assistance, please do not hesitate to contact me.    Sincerely,    Nu Paniagua LPN  for  DENA Hilario MD

## 2025-03-25 ENCOUNTER — PATIENT MESSAGE (OUTPATIENT)
Dept: VASCULAR SURGERY | Facility: CLINIC | Age: 45
End: 2025-03-25
Payer: COMMERCIAL

## 2025-03-25 ENCOUNTER — PATIENT MESSAGE (OUTPATIENT)
Dept: PHYSICAL MEDICINE AND REHAB | Facility: CLINIC | Age: 45
End: 2025-03-25
Payer: COMMERCIAL

## 2025-03-26 DIAGNOSIS — G54.0 NEUROGENIC THORACIC OUTLET SYNDROME OF LEFT BRACHIAL PLEXUS: Primary | ICD-10-CM

## 2025-03-27 ENCOUNTER — PATIENT MESSAGE (OUTPATIENT)
Dept: VASCULAR SURGERY | Facility: CLINIC | Age: 45
End: 2025-03-27
Payer: COMMERCIAL

## 2025-04-14 ENCOUNTER — TELEPHONE (OUTPATIENT)
Dept: PAIN MEDICINE | Facility: CLINIC | Age: 45
End: 2025-04-14
Payer: COMMERCIAL

## 2025-04-14 NOTE — TELEPHONE ENCOUNTER
Staff attempted to contact patient to inform that her procedure for a trigger point injection does not need a prior auth.

## 2025-04-16 ENCOUNTER — PATIENT MESSAGE (OUTPATIENT)
Dept: FAMILY MEDICINE | Facility: CLINIC | Age: 45
End: 2025-04-16
Payer: COMMERCIAL

## 2025-04-16 ENCOUNTER — OFFICE VISIT (OUTPATIENT)
Dept: PAIN MEDICINE | Facility: CLINIC | Age: 45
End: 2025-04-16
Payer: COMMERCIAL

## 2025-04-16 VITALS — BODY MASS INDEX: 23.49 KG/M2 | WEIGHT: 155 LBS | HEIGHT: 68 IN

## 2025-04-16 DIAGNOSIS — I87.1: Primary | ICD-10-CM

## 2025-04-16 DIAGNOSIS — G54.0 NEUROGENIC THORACIC OUTLET SYNDROME OF LEFT BRACHIAL PLEXUS: Primary | ICD-10-CM

## 2025-04-16 PROCEDURE — 99999 PR PBB SHADOW E&M-EST. PATIENT-LVL IV: CPT | Mod: PBBFAC,,, | Performed by: STUDENT IN AN ORGANIZED HEALTH CARE EDUCATION/TRAINING PROGRAM

## 2025-04-16 NOTE — PROGRESS NOTES
[place] - Department    Berenice Garrido MD      First Office Visit: 8/28/24  Today' Date: 4/16/2025  Last Office Visit: 1/2/25    Chief complaint: neck pain    HPI: Pt is a pleasant 45 y.o., who presents for evaluation. Referred by self. Pt seen previously for neck pain and bilateral shoulder pain since 2010. Pt seen today for f/u. Pt was diagnosed venous TOS after having CTA showing dynamic compression of bilat subclavian veins at the thoracic inlet. Pt does continue to endorse pain that starts in the neck and goes down to bilat shoulders/traps that is particularly worse with raising arms. States it is worse on the L side. Pt has seen Dr. Peña who did note very positive signs of TOS including positive Winters's and Geovanni test. EMG so far has been remarkable for L CTS and MRI showed some degenerative changes at 5-6. Denies having sharp, shooting pain going down both arms. MR C-spine from 5 yrs ago does show L foraminal stenosis at C5-6. Denies headaches, balance issues, no problems dropping objects. Pt does have f/u appt with Dr. Abel soon. No BB changes. Is doing HEP.         Pain disability index score: 63  Pain score: 3    Relevant Imaging/ Testing:   MR Brachial plexus 12/24 - nml  CT chest angiogram 12/24 - Dynamic compression of the distal left subclavian vein and brachial plexus in the pectoralis minor space. Dynamic compression of both proximal subclavian veins at thoracic inlet  MR R shoulder 9/24 - partial thickness tear of infraspinatus tendon  XR R shoulder 9/24  XR B shoulders 8/24  XR L-spine 10/16  XR C-spine 10/16, 8/24  MR C-spine 9/16, 11/24 - moderate L foraminal narrowing C5-6  MR L-spine 9/16    Procedures: None    Date of board of pharmacy review:4/16/2025  Date of opioid risk screening/ pain psych: None  Date of opioid agreement and consent: None  Date of urine drug screen: None  Date of random pill count: None     was reviewed today: reviewed, clonazepam use    Prescribed  medications: None    See EHR for  PMH, PSH, FH, SH, Medications and Allergy    ROS:  Positive for pain  ROS     PE:  There were no vitals filed for this visit.  General: Pleasant, no distress  HEENT: NC/ AT. PERRLA  CV: Radial pulses intact  Pulm: No distress  Ext: No edema    Physical Exam     Neuromusculoskeletal:  Head: NC, AT. PERRLA. No occipital tenderness  Neck: Intact range of motions, extension, flexion, rotation. Bilat Facet loading. Neg Spurling. Min Tenderness.  5/5 Strength, normal tone. Neg Brady's  Shoulder: Intact range of motion. Min Bicep groove tenderness. 5/5 Strength. Pos DAVIS's   Lumbar: Intact range of motion  Hip: Intact range of motion  SI: Level  Knee: Intact range of motion  Reflexes: normal Bicep  Strength: 5/5 globally   Sensory: decreased sensation of R arm   Skin: No bruising, erythema  Gait: Normal      Impression:  Neck pain (L>R)  Bilateral shoulder pain  Relevant History  BMI 23.81  Bipolar  Anxiety  Fibromyalgia     Assessment:  Venous TOS - CTA showing dynamic compression of proximal subclavian veins at thoracic inlet   Cervical radiculopathy - L foraminal narrowing C5-6   R shoulder rotator cuff tear  B CTS  L cubital tunnel syndrome       Plan:  Discussed options  Imaging/ relevant records viewed/ reviewed/ discussed  Imaging results viewed and reviewed (noted above)/ reviewed with patient   reviewed  Cont HEP  Schedule for L anterior scalene and middle scalene botox for TOS - pt understands risks including bleeding, infection, damage to surrounding tissue  Re-eval after  Consider R sided botox if needed  Consider ILESI C7-T1 for L cervical radiculopathy C5-6   Cont care with Dr. Peña for shoulder          Prescribed medications:  1. None    The impression and plan were discussed and explained in detail. All the questions were answered. Education was provided accordingly.     The procedure was explained in detail, along with risks and potential side  effects.        Follow-up:  For procedure     Chloe Huerta MD

## 2025-05-05 ENCOUNTER — TELEPHONE (OUTPATIENT)
Dept: PAIN MEDICINE | Facility: CLINIC | Age: 45
End: 2025-05-05
Payer: COMMERCIAL

## 2025-05-05 ENCOUNTER — LAB VISIT (OUTPATIENT)
Dept: LAB | Facility: HOSPITAL | Age: 45
End: 2025-05-05
Payer: COMMERCIAL

## 2025-05-05 DIAGNOSIS — M79.7 SCAPULOHUMERAL FIBROSITIS: ICD-10-CM

## 2025-05-05 DIAGNOSIS — Z79.899 POLYPHARMACY: ICD-10-CM

## 2025-05-05 DIAGNOSIS — M06.9 RHEUMATOID ARTHRITIS, INVOLVING UNSPECIFIED SITE, UNSPECIFIED WHETHER RHEUMATOID FACTOR PRESENT: ICD-10-CM

## 2025-05-05 DIAGNOSIS — L30.9 ACUTE DERMATITIS: ICD-10-CM

## 2025-05-05 DIAGNOSIS — A49.9 ACUTE BACTERIAL ARTHRITIS: Primary | ICD-10-CM

## 2025-05-05 DIAGNOSIS — M01.X0 ACUTE BACTERIAL ARTHRITIS: Primary | ICD-10-CM

## 2025-05-05 DIAGNOSIS — M25.50 PAIN IN JOINT, MULTIPLE SITES: ICD-10-CM

## 2025-05-05 DIAGNOSIS — M54.2 CERVICALGIA: ICD-10-CM

## 2025-05-05 DIAGNOSIS — M06.011: ICD-10-CM

## 2025-05-05 LAB
ABSOLUTE EOSINOPHIL (OHS): 0.11 K/UL
ABSOLUTE MONOCYTE (OHS): 0.42 K/UL (ref 0.3–1)
ABSOLUTE NEUTROPHIL COUNT (OHS): 4.32 K/UL (ref 1.8–7.7)
ALBUMIN SERPL BCP-MCNC: 3.7 G/DL (ref 3.5–5.2)
ALP SERPL-CCNC: 81 UNIT/L (ref 40–150)
ALT SERPL W/O P-5'-P-CCNC: 8 UNIT/L (ref 10–44)
ANION GAP (OHS): 5 MMOL/L (ref 8–16)
AST SERPL-CCNC: 12 UNIT/L (ref 11–45)
BASOPHILS # BLD AUTO: 0.06 K/UL
BASOPHILS NFR BLD AUTO: 0.8 %
BILIRUB SERPL-MCNC: 0.6 MG/DL (ref 0.1–1)
BUN SERPL-MCNC: 15 MG/DL (ref 6–20)
CALCIUM SERPL-MCNC: 9.1 MG/DL (ref 8.7–10.5)
CHLORIDE SERPL-SCNC: 111 MMOL/L (ref 95–110)
CO2 SERPL-SCNC: 22 MMOL/L (ref 23–29)
CREAT SERPL-MCNC: 0.8 MG/DL (ref 0.5–1.4)
CRP SERPL-MCNC: 1 MG/L
ERYTHROCYTE [DISTWIDTH] IN BLOOD BY AUTOMATED COUNT: 14.2 % (ref 11.5–14.5)
ERYTHROCYTE [SEDIMENTATION RATE] IN BLOOD BY PHOTOMETRIC METHOD: 18 MM/HR
GFR SERPLBLD CREATININE-BSD FMLA CKD-EPI: >60 ML/MIN/1.73/M2
GLUCOSE SERPL-MCNC: 81 MG/DL (ref 70–110)
HCT VFR BLD AUTO: 39.5 % (ref 37–48.5)
HGB BLD-MCNC: 12 GM/DL (ref 12–16)
IMM GRANULOCYTES # BLD AUTO: 0.03 K/UL (ref 0–0.04)
IMM GRANULOCYTES NFR BLD AUTO: 0.4 % (ref 0–0.5)
LYMPHOCYTES # BLD AUTO: 2.35 K/UL (ref 1–4.8)
MCH RBC QN AUTO: 27 PG (ref 27–31)
MCHC RBC AUTO-ENTMCNC: 30.4 G/DL (ref 32–36)
MCV RBC AUTO: 89 FL (ref 82–98)
NUCLEATED RBC (/100WBC) (OHS): 0 /100 WBC
PLATELET # BLD AUTO: 296 K/UL (ref 150–450)
PMV BLD AUTO: 12.2 FL (ref 9.2–12.9)
POTASSIUM SERPL-SCNC: 4.1 MMOL/L (ref 3.5–5.1)
PROT SERPL-MCNC: 7.1 GM/DL (ref 6–8.4)
RBC # BLD AUTO: 4.44 M/UL (ref 4–5.4)
RELATIVE EOSINOPHIL (OHS): 1.5 %
RELATIVE LYMPHOCYTE (OHS): 32.2 % (ref 18–48)
RELATIVE MONOCYTE (OHS): 5.8 % (ref 4–15)
RELATIVE NEUTROPHIL (OHS): 59.3 % (ref 38–73)
SODIUM SERPL-SCNC: 138 MMOL/L (ref 136–145)
WBC # BLD AUTO: 7.29 K/UL (ref 3.9–12.7)

## 2025-05-05 PROCEDURE — 36415 COLL VENOUS BLD VENIPUNCTURE: CPT | Mod: PO

## 2025-05-05 PROCEDURE — 86480 TB TEST CELL IMMUN MEASURE: CPT

## 2025-05-05 PROCEDURE — 85652 RBC SED RATE AUTOMATED: CPT

## 2025-05-05 PROCEDURE — 86706 HEP B SURFACE ANTIBODY: CPT

## 2025-05-05 PROCEDURE — 80053 COMPREHEN METABOLIC PANEL: CPT

## 2025-05-05 PROCEDURE — 87340 HEPATITIS B SURFACE AG IA: CPT | Mod: PO

## 2025-05-05 PROCEDURE — 86140 C-REACTIVE PROTEIN: CPT

## 2025-05-05 PROCEDURE — 85025 COMPLETE CBC W/AUTO DIFF WBC: CPT

## 2025-05-05 NOTE — TELEPHONE ENCOUNTER
From: Aruna Cortes LPN   Sent: 2025  11:18 AM CDT   To: Chloe Huerta MD   Subject: FW: Denial Details                               Would you like to try a p2p?   ----- Message -----   From: Smitha Walker   Sent: 2025  11:16 AM CDT   To: Deanna Corona Staff   Subject: Denial Details                                       [facility] code:      & 71184   Medication name: BOTOX          The request has been denied for the following reason (be specific):     Services determined as investigational are not covered by your member contract.            Instructions for peer to peer / appeal: N/A          Name: Pura Gudino   :1980   Primary Coverage Payor: [facility]   Primary Coverage Name: [facility] KRANTHI AMBROCIO OPEN ACCESS   Primary Coverage ID: [id number]   Primary Group Number: [id number]            Case reference # [id number]   Phone # for peer to peer: [phone]   Prompt response options:          Deadline for completing peer to peer WITHIN 10 DAYS

## 2025-05-05 NOTE — TELEPHONE ENCOUNTER
Called and had to give 3 days of availability. 05/06 after 4 pm 05/07 after 330 pm 05/08 after 4 pm they will call you cell one of these dates and times. Thank you

## 2025-05-06 LAB
MITOGEN MINUS NIL (OHS): 9.99
NIL TB SYNCED (OHS): 0.01
QUANTIFERON GOLD INTERP (OHS): NEGATIVE
TB1 AG MINUS NIL (OHS): 0.06
TB2 AG MINUS NIL (OHS): 0.04

## 2025-05-07 LAB — HBV SURFACE AG SERPL QL IA: NEGATIVE

## 2025-05-08 LAB
W HEPATITIS B SURFACE ANTIBODY, QUALITATIVE: NEGATIVE
W HEPATITIS B SURFACE ANTIBODY, QUANTITATIVE: <3 MIU/ML

## 2025-05-08 NOTE — TELEPHONE ENCOUNTER
Dr Deanna Bone did a p2p on this and it was approved can you check the website again to make sure she gets cleared? Thank you.

## 2025-05-14 ENCOUNTER — OFFICE VISIT (OUTPATIENT)
Dept: PAIN MEDICINE | Facility: CLINIC | Age: 45
End: 2025-05-14
Payer: COMMERCIAL

## 2025-05-14 VITALS — WEIGHT: 155 LBS | BODY MASS INDEX: 23.49 KG/M2 | HEIGHT: 68 IN

## 2025-05-14 DIAGNOSIS — I87.1: Primary | ICD-10-CM

## 2025-05-14 PROCEDURE — 99999 PR PBB SHADOW E&M-EST. PATIENT-LVL IV: CPT | Mod: PBBFAC,,, | Performed by: STUDENT IN AN ORGANIZED HEALTH CARE EDUCATION/TRAINING PROGRAM

## 2025-05-14 NOTE — PROGRESS NOTES
[facility] - Department    Berenice Garrido MD      First Office Visit: 8/28/24  Today' Date: 5/14/2025  Last Office Visit: 4/16/25    Chief complaint: neck pain    HPI: Pt is a pleasant 45 y.o., who presents for evaluation. Referred by self. Pt seen previously for neck pain and bilateral shoulder pain since 2010. Pt seen today for botox to anterior and middle scalene muscles. Pt was diagnosed venous TOS after having CTA showing dynamic compression of bilat subclavian veins at the thoracic inlet.     Pt previously endorsed pain that starts in the neck and goes down to bilat shoulders/traps that is particularly worse with raising arms worse on the L side. Pt has seen Dr. Peña who did note very positive signs of TOS including positive Winters's and Geovanni test. EMG so far has been remarkable for L CTS and MRI showed some degenerative changes at 5-6. Denies having sharp, shooting pain going down both arms. MR C-spine from 5 yrs ago does show L foraminal stenosis at C5-6. Denies headaches, balance issues, no problems dropping objects. No BB changes. Is doing HEP.         Pain disability index score: 63  Pain score: 3    Relevant Imaging/ Testing:   MR Brachial plexus 12/24 - nml  CT chest angiogram 12/24 - Dynamic compression of the distal left subclavian vein and brachial plexus in the pectoralis minor space. Dynamic compression of both proximal subclavian veins at thoracic inlet  MR R shoulder 9/24 - partial thickness tear of infraspinatus tendon  XR R shoulder 9/24  XR B shoulders 8/24  XR L-spine 10/16  XR C-spine 10/16, 8/24  MR C-spine 9/16, 11/24 - moderate L foraminal narrowing C5-6  MR L-spine 9/16    Procedures: None    Date of board of pharmacy review:5/14/2025  Date of opioid risk screening/ pain psych: None  Date of opioid agreement and consent: None  Date of urine drug screen: None  Date of random pill count: None     was reviewed today: reviewed, clonazepam use    Prescribed medications:  None    See EHR for  PMH, PSH, FH, SH, Medications and Allergy    ROS:  Positive for pain  ROS     PE:  There were no vitals filed for this visit.  General: Pleasant, no distress  HEENT: NC/ AT. PERRLA  CV: Radial pulses intact  Pulm: No distress  Ext: No edema    Physical Exam     Neuromusculoskeletal:  Head: NC, AT. PERRLA. No occipital tenderness  Neck: Intact range of motions, extension, flexion, rotation. Bilat Facet loading. Neg Spurling. Min Tenderness.  5/5 Strength, normal tone. Neg Brady's  Shoulder: Intact range of motion. Min Bicep groove tenderness. 5/5 Strength. Pos DAVIS's   Lumbar: Intact range of motion  Hip: Intact range of motion  SI: Level  Knee: Intact range of motion  Reflexes: normal Bicep  Strength: 5/5 globally   Sensory: decreased sensation of R arm   Skin: No bruising, erythema  Gait: Normal      Impression:  Neck pain (L>R)  Bilateral shoulder pain  Relevant History  BMI 23.81  Bipolar  Anxiety  Fibromyalgia     Assessment:  Venous TOS - CTA showing dynamic compression of proximal subclavian veins at thoracic inlet   Cervical radiculopathy - L foraminal narrowing C5-6   R shoulder rotator cuff tear  B CTS  L cubital tunnel syndrome       Plan:  Discussed options  Imaging/ relevant records viewed/ reviewed/ discussed  Imaging results viewed and reviewed (noted above)/ reviewed with patient   reviewed  Cont HEP  Botox to L anterior scalene and middle scalene today  Re-eval after  Consider R sided botox if needed  Consider ILESI C7-T1 for L cervical radiculopathy C5-6   Cont care with Dr. Peña for shoulder          Prescribed medications:  1. None    The impression and plan were discussed and explained in detail. All the questions were answered. Education was provided accordingly.     The procedure was explained in detail, along with risks and potential side effects.        Follow-up:  2 wks or sooner if needed    Chloe Huerta MD

## 2025-05-15 NOTE — PROCEDURES
Botulinum Injection  Location: Neck    Date/Time: 5/14/2025 11:20 AM    Performed by: Chloe Huerta MD  Authorized by: Chloe Huerta MD      Consent:      Consent obtained:  Verbal     Consent given by:  Patient     Alternatives discussed:  No treatment  Procedure details:      Ultrasound used?:  Yes     Laterality: Left     Toxin (Brand):  OnaBoNT-A (Botox)     Left mid scalene complex:  25 units divided amongst site(s)     Left anterior scalene complex:  25 units divided amongst site(s)       Total units injected:  50     Total units wasted:  -50    Medications: 100 Units onabotulinumtoxina 200 unit    Post-procedure details:      Patient tolerance of procedure:  Tolerated well, no immediate complications

## 2025-05-27 ENCOUNTER — OFFICE VISIT (OUTPATIENT)
Dept: FAMILY MEDICINE | Facility: CLINIC | Age: 45
End: 2025-05-27
Payer: COMMERCIAL

## 2025-05-27 VITALS
OXYGEN SATURATION: 99 % | RESPIRATION RATE: 18 BRPM | BODY MASS INDEX: 24.22 KG/M2 | WEIGHT: 159.81 LBS | HEART RATE: 85 BPM | HEIGHT: 68 IN | DIASTOLIC BLOOD PRESSURE: 64 MMHG | SYSTOLIC BLOOD PRESSURE: 100 MMHG

## 2025-05-27 DIAGNOSIS — F31.9 BIPOLAR AFFECTIVE DISORDER, REMISSION STATUS UNSPECIFIED: ICD-10-CM

## 2025-05-27 DIAGNOSIS — G44.209 TENSION-TYPE HEADACHE, NOT INTRACTABLE, UNSPECIFIED CHRONICITY PATTERN: ICD-10-CM

## 2025-05-27 DIAGNOSIS — Z00.00 ANNUAL PHYSICAL EXAM: ICD-10-CM

## 2025-05-27 DIAGNOSIS — H92.09 OTALGIA, UNSPECIFIED LATERALITY: ICD-10-CM

## 2025-05-27 DIAGNOSIS — G43.019 INTRACTABLE MIGRAINE WITHOUT AURA AND WITHOUT STATUS MIGRAINOSUS: ICD-10-CM

## 2025-05-27 DIAGNOSIS — M06.9 RHEUMATOID ARTHRITIS, INVOLVING UNSPECIFIED SITE, UNSPECIFIED WHETHER RHEUMATOID FACTOR PRESENT: Primary | ICD-10-CM

## 2025-05-27 PROCEDURE — 99999 PR PBB SHADOW E&M-EST. PATIENT-LVL V: CPT | Mod: PBBFAC,,, | Performed by: INTERNAL MEDICINE

## 2025-05-27 RX ORDER — FREMANEZUMAB-VFRM 225 MG/1.5ML
225 INJECTION SUBCUTANEOUS
Qty: 1.5 ML | Refills: 11 | Status: SHIPPED | OUTPATIENT
Start: 2025-05-27

## 2025-05-27 NOTE — PROGRESS NOTES
Subjective     Patient ID: Pura Gudino is a 45 y.o. female.    Chief Complaint: Medication Refill (Medication refill and blood test results /Left ear pain)    Pt here for annaul check up.      Utd labs, mammo, colonosconpy  Continous left ear pain, flonase helped a bit. Does not think tmj    Medication Refill  Pertinent negatives include no chest pain, fever or headaches.   Review of Systems   Constitutional:  Negative for fever.   Respiratory:  Negative for shortness of breath.    Cardiovascular:  Negative for chest pain.   Neurological:  Negative for headaches.          Objective     Physical Exam  Constitutional:       Appearance: Normal appearance.   HENT:      Head: Normocephalic.   Cardiovascular:      Rate and Rhythm: Normal rate and regular rhythm.   Pulmonary:      Effort: Pulmonary effort is normal.      Breath sounds: Normal breath sounds.   Musculoskeletal:         General: Normal range of motion.      Cervical back: Normal range of motion.   Neurological:      General: No focal deficit present.      Mental Status: She is alert.   Psychiatric:         Mood and Affect: Mood normal.         Behavior: Behavior normal.            Assessment and Plan     1. Rheumatoid arthritis, involving unspecified site, unspecified whether rheumatoid factor present    2. Intractable migraine without aura and without status migrainosus  -     fremanezumab-vfrm (AJOVY AUTOINJECTOR) 225 mg/1.5 mL autoinjector; Inject 1.5 mL (225 mg total) into the skin every 30 days.  Dispense: 1.5 mL; Refill: 11    3. Tension-type headache, not intractable, unspecified chronicity pattern  Overview:  Dx updated per 2019 IMO Load      4. Bipolar affective disorder, remission status unspecified    5. Otalgia, unspecified laterality  -     Ambulatory referral/consult to ENT; Future; Expected date: 06/03/2025    6. Annual physical exam        Ear pain referred to ent  Annual utd         No follow-ups on file.

## 2025-05-28 ENCOUNTER — PATIENT MESSAGE (OUTPATIENT)
Dept: OTOLARYNGOLOGY | Facility: CLINIC | Age: 45
End: 2025-05-28
Payer: COMMERCIAL

## 2025-06-07 ENCOUNTER — PATIENT MESSAGE (OUTPATIENT)
Dept: PAIN MEDICINE | Facility: CLINIC | Age: 45
End: 2025-06-07
Payer: COMMERCIAL

## 2025-06-09 ENCOUNTER — PATIENT MESSAGE (OUTPATIENT)
Dept: OTOLARYNGOLOGY | Facility: CLINIC | Age: 45
End: 2025-06-09

## 2025-06-09 ENCOUNTER — OFFICE VISIT (OUTPATIENT)
Dept: OTOLARYNGOLOGY | Facility: CLINIC | Age: 45
End: 2025-06-09
Payer: COMMERCIAL

## 2025-06-09 ENCOUNTER — PATIENT MESSAGE (OUTPATIENT)
Dept: FAMILY MEDICINE | Facility: CLINIC | Age: 45
End: 2025-06-09
Payer: COMMERCIAL

## 2025-06-09 VITALS — BODY MASS INDEX: 24.47 KG/M2 | WEIGHT: 160.94 LBS

## 2025-06-09 DIAGNOSIS — H92.03 REFERRED OTALGIA, BILATERAL: Primary | ICD-10-CM

## 2025-06-09 DIAGNOSIS — Z01.10 NORMAL HEARING TEST OF BOTH EARS: ICD-10-CM

## 2025-06-09 PROCEDURE — 99999 PR PBB SHADOW E&M-EST. PATIENT-LVL V: CPT | Mod: PBBFAC,,, | Performed by: NURSE PRACTITIONER

## 2025-06-09 RX ORDER — TOPIRAMATE 100 MG/1
TABLET, FILM COATED ORAL
Qty: 30 TABLET | Refills: 1 | OUTPATIENT
Start: 2025-06-09

## 2025-06-09 NOTE — PROGRESS NOTES
"Subjective     Patient ID: Pura Gudino is a 45 y.o. female.    Chief Complaint: Otalgia (Pain comes and go) and Ear Fullness (Pt states the muffling is in both but the L is worse than the R, pain comes when laying on L side)    HPI  Patient saw ENT Dr. Daniel 4 months ago for left otalgia, ear fullness, and tinnitus: "some overlpaping symptoms of TMJ and Eustachian Tube Dysfunction. Creptius at L TMJ which does recreate her described popping sound/sensation, but she does also have have more classic Eustachian Tube Dysfunction popping/crackling. I would recommend TMJ specialist for her TMJ and audio/tymps. If there is concurrent Eustachian Tube Dysfunction/Conductive Hearing Loss we may consider PET or ET dilation."  A past audiogram was done here on 11/30/2012 and was WNL AU.   Patient provides an audiogram which was done elsewhere on 05/30/2025 which revealed normal hearing AU and normal tympanograms with compliance of 0.21 mls and 0.18 mls.   Patient reports popping AU and states she can hear fluid moving behind her eardrums. Patient develops pain in her left ear after she has been laying on her left ear for a while. Patient states she has been told by two providers that her eardrums were hemorrhaged, but states she is then told by other providers that her ear drums look fine.     Audiogram done elsewhere on 05/30/2025:        Review of Systems   Constitutional: Negative.    HENT:  Positive for ear pain.    Eyes: Negative.    Respiratory: Negative.     Cardiovascular: Negative.    Gastrointestinal: Negative.    Musculoskeletal: Negative.    Integumentary:  Negative.   Neurological: Negative.    Hematological: Negative.    Psychiatric/Behavioral: Negative.            Objective     Physical Exam  Vitals and nursing note reviewed.   Constitutional:       General: She is not in acute distress.     Appearance: She is well-developed. She is not ill-appearing.   HENT:      Head: Normocephalic and atraumatic. "      Right Ear: Hearing normal.      Left Ear: Hearing normal.      Nose: Nose normal.   Eyes:      General: Lids are normal. No scleral icterus.        Right eye: No discharge.         Left eye: No discharge.   Neck:      Trachea: Trachea normal. No tracheal deviation.   Cardiovascular:      Rate and Rhythm: Normal rate.   Pulmonary:      Effort: Pulmonary effort is normal. No respiratory distress.      Breath sounds: No stridor. No wheezing.   Musculoskeletal:         General: Normal range of motion.      Cervical back: Normal range of motion.   Neurological:      Mental Status: She is alert and oriented to person, place, and time.      Coordination: Coordination normal.      Gait: Gait normal.   Psychiatric:         Attention and Perception: Attention normal.         Mood and Affect: Mood normal.         Speech: Speech normal.            Assessment and Plan     1. Referred otalgia, bilateral  -     Ambulatory referral/consult to ENT    2. Normal hearing test of both ears        I started off by reviewing her audiogram done elsewhere done 10 days ago. I stated that it was normal. The patient disagreed stating that no, it was abnormal, because she has fluid behind her ears as told to her by the audiologist who performed the test. I never got to the chance to examine the patient myself, but while reviewing the tympanograms (compliance 0.21 and 0.18 mls) and audiogram (pure tones within normal limits AU across all frequencies with no air-bone gap or conductive component) with her and her significant other, I offered that there could be several other issues which can contribute to her symptoms other than middle ear fluid or infection; however the patient did not allow me to expound on the list of differentials that could cause referred otalgia. I offered her imaging (CT temporal bones), which she is welcome to call me back for if she changes her mind. Patient ended the visit immediately and abruptly following  discussion of the normal tympanograms/audiogram 10 days ago insisting that my interpretation was wrong and that she does in fact have middle ear fluid. I included the list of differential diagnoses that can contribute to referred otalgia in her After Visit Summary if she would like to review it on her own. I also included TMJ handout in her AVS for her to review as I noticed dental malocclusion issues during our conversation. No charge was levied as I sympathize with the patient concerns, but unfortunately could not be of more assistance at today's visit.         No follow-ups on file.

## 2025-06-09 NOTE — PATIENT INSTRUCTIONS
"The "gold standard" for determining the presence or absence of middle ear fluid is tympanometry.  You had normal, Type "A" tympanograms, which means no fluid behind your ear drums.  Since there is no middle ear fluid, there is no infection. Other possible causes for continued ear pain can include but are not limited to:   dental pathology or TMJ (jaw joint arthritis) -- see your dentist  cervical spine arthritis (discuss possible imaging/MRI with PCP)  myofascial pain syndrome/headaches or neuralgias (see your neurologist)  GERD (anti-acid reflux medications twice daily and see your GI)  head/neck neoplasms (CT neck w/contrast)  Shingles (would break out in a painful, red, blistering rash on one side)    TMJ SYNDROME   This is a condition with chronic or recurrent pain in the joint of the jaw (in front of the ear). The pain may cause limited motion of the jaw, a locking or catching sensation, clicking, popping or grinding sounds from the joint with movement. It may also lead to headache, earache or neck pain. It is sometimes caused by inflammation in the joint, injury or wear-and-tear of the cartilage in the joint, involuntary grinding of the teeth or poorly fitting dentures. Emotional stress and tension are often a factor. Most cases resolve completely within a few months with proper treatment.     HOME CARE:   1) Rest the jaw by avoiding crunchy or hard foods to chew. Do not eat hard or sticky candies. Soft foods and liquids are easier on the jaw. Protect your jaw while yawning.   2) Hot packs (small towel soaked in hot water) applied to the jaw may give relief by reducing muscle spasm. You may use a heating pad or a towel soaked in hot water. Some people get relief with cold packs, so try both and see which one works best for you.   3) You may use acetaminophen (Tylenol) or ibuprofen (Motrin, Advil) to control pain, unless another medicine was prescribed. [ NOTE : If you have chronic liver or kidney disease or " "ever had a stomach ulcer or GI bleeding, talk with your doctor before using these medicines.]   4) If you suspect emotional stress is related to your condition.   a) Try to identify the sources of stress in your life. It may not be obvious! These may include:   -- Daily hassles of life that pile up (traffic jams, missed appointments, car troubles)   -- Major life changes, both good (new baby, job promotion) and bad (loss of job, loss of loved one)   -- Overload: feeling that you have too many responsibilities and can't take care of everything at once   -- Helplessness: feeling like your problems are more than you can solve   b) When possible, do something about the source of your stress: avoid hassles, limit the amount of change that is happening in your life at one time and take a break when you feel overloaded.   c) Unfortunately, many stressful situations cannot be avoided. Therefore, it is necessary to learn HOW TO MANAGE STRESS better. There are many proven methods that work and will reduce your anxiety. These include simple things like exercise, good nutrition and adequate rest. Also, there are certain techniques that are helpful: relaxation and breathing exercises, visualization, biofeedback, meditation or simply taking some time-out to clear your mind. For more information about this, consult your doctor or go to a local bookstore and review the many books and tapes available on this subject.     FOLLOW-UP as directed with a dentist or oral surgeon. Further testing and additional treatment may be required. If you grind your teeth at night, a custom-made "bite guard" may help you. If stress is an important factor and does not respond to the above simple measures, talk to your doctor about a referral for stress management.     GET PROMPT MEDICAL ATTENTION if any of the following occur:   -- Your face becomes swollen or red   -- Pain worsens   -- 100.0°F (37.8°C)  -- Increasing neck, mouth, tooth or throat " pain

## 2025-06-10 ENCOUNTER — OFFICE VISIT (OUTPATIENT)
Dept: PAIN MEDICINE | Facility: CLINIC | Age: 45
End: 2025-06-10
Payer: COMMERCIAL

## 2025-06-10 VITALS — BODY MASS INDEX: 24.25 KG/M2 | WEIGHT: 160 LBS | HEIGHT: 68 IN

## 2025-06-10 DIAGNOSIS — G89.29 CHRONIC RIGHT SHOULDER PAIN: Primary | ICD-10-CM

## 2025-06-10 DIAGNOSIS — M25.511 CHRONIC RIGHT SHOULDER PAIN: Primary | ICD-10-CM

## 2025-06-10 PROCEDURE — 1159F MED LIST DOCD IN RCRD: CPT | Mod: CPTII,S$GLB,, | Performed by: STUDENT IN AN ORGANIZED HEALTH CARE EDUCATION/TRAINING PROGRAM

## 2025-06-10 PROCEDURE — 3008F BODY MASS INDEX DOCD: CPT | Mod: CPTII,S$GLB,, | Performed by: STUDENT IN AN ORGANIZED HEALTH CARE EDUCATION/TRAINING PROGRAM

## 2025-06-10 PROCEDURE — 99213 OFFICE O/P EST LOW 20 MIN: CPT | Mod: S$GLB,,, | Performed by: STUDENT IN AN ORGANIZED HEALTH CARE EDUCATION/TRAINING PROGRAM

## 2025-06-10 PROCEDURE — 99999 PR PBB SHADOW E&M-EST. PATIENT-LVL IV: CPT | Mod: PBBFAC,,, | Performed by: STUDENT IN AN ORGANIZED HEALTH CARE EDUCATION/TRAINING PROGRAM

## 2025-06-10 PROCEDURE — 1160F RVW MEDS BY RX/DR IN RCRD: CPT | Mod: CPTII,S$GLB,, | Performed by: STUDENT IN AN ORGANIZED HEALTH CARE EDUCATION/TRAINING PROGRAM

## 2025-06-10 RX ORDER — TOPIRAMATE 25 MG/1
TABLET, FILM COATED ORAL
Qty: 30 TABLET | Refills: 6 | Status: SHIPPED | OUTPATIENT
Start: 2025-06-10

## 2025-06-10 RX ORDER — TOPIRAMATE 100 MG/1
TABLET, FILM COATED ORAL
Qty: 30 TABLET | Refills: 1 | Status: SHIPPED | OUTPATIENT
Start: 2025-06-10

## 2025-06-10 NOTE — PROGRESS NOTES
[place] - Department    Berenice Garrido MD      First Office Visit: 8/28/24  Today' Date: 6/10/2025  Last Office Visit: 5/14/25    Chief complaint: neck pain    HPI: Pt is a pleasant 45 y.o., who presents for evaluation. Referred by self. Pt seen previously for neck pain and bilateral shoulder pain since 2010. Pt seen today for botox to anterior and middle scalene muscles. Pt was diagnosed venous TOS after having CTA showing dynamic compression of bilat subclavian veins at the thoracic inlet.     Pt seen today for f/u from L anterior and middle scalene botox. Pt has had great relief on the L side and states pain currently is tolerable. Previously endorsed having pain that starts in the neck and goes down to bilat shoulders/traps that is particularly worse with raising arms worse on the L side. Pt's primary complaint today is R shoulder pain. Does have R shoulder pain worse with moving the R arm. Has had MR R shoulder in the past showing rotator cuff pathology. Has had PT in the past and is not interested in repeating PT. Would prefer to do HEP.         Pain disability index score: 63  Pain score: 3    Relevant Imaging/ Testing:   MR Brachial plexus 12/24 - nml  CT chest angiogram 12/24 - Dynamic compression of the distal left subclavian vein and brachial plexus in the pectoralis minor space. Dynamic compression of both proximal subclavian veins at thoracic inlet  MR R shoulder 9/24 - partial thickness tear of infraspinatus tendon  XR R shoulder 9/24  XR B shoulders 8/24  XR L-spine 10/16  XR C-spine 10/16, 8/24  MR C-spine 9/16, 11/24 - moderate L foraminal narrowing C5-6  MR L-spine 9/16    Procedures:   L anterior and middle scale botox (25u per for 50u total) 5/14/25 - >80% relief     Date of board of pharmacy review:6/10/2025  Date of opioid risk screening/ pain psych: None  Date of opioid agreement and consent: None  Date of urine drug screen: None  Date of random pill count: None     was reviewed today:  reviewed, clonazepam use    Prescribed medications: None    See EHR for  PMH, PSH, FH, SH, Medications and Allergy    ROS:  Positive for pain  ROS     PE:  There were no vitals filed for this visit.  General: Pleasant, no distress  HEENT: NC/ AT. PERRLA  CV: Radial pulses intact  Pulm: No distress  Ext: No edema    Physical Exam     Neuromusculoskeletal:  Head: NC, AT. PERRLA. No occipital tenderness  Neck: Intact range of motions, extension, flexion, rotation. Bilat Facet loading. Neg Spurling. Min Tenderness.  5/5 Strength, normal tone. Neg Brady's  Shoulder: Intact range of motion. Min Bicep groove tenderness. 5/5 Strength. Pos DAVIS's   Lumbar: Intact range of motion  Hip: Intact range of motion  SI: Level  Knee: Intact range of motion  Reflexes: normal Bicep  Strength: 5/5 globally   Sensory: decreased sensation of R arm   Skin: No bruising, erythema  Gait: Normal      Impression:  R shoulder pain   Neck pain (L>R)  Relevant History  BMI 23.81  Bipolar  Anxiety  Fibromyalgia     Assessment:  R shoulder rotator cuff tear  Venous TOS - CTA showing dynamic compression of proximal subclavian veins at thoracic inlet   Cervical radiculopathy - L foraminal narrowing C5-6   B CTS  L cubital tunnel syndrome       Plan:  Discussed options  Imaging/ relevant records viewed/ reviewed/ discussed  Imaging results viewed and reviewed (noted above)/ reviewed with patient   reviewed  Cont HEP  Recommend f/u with Dr. Peña for R shoulder rotator cuff tear. Can consider R subacromial bursa injection if needed  Continue to monitor progress from botox to L anterior scalene and middle scalene - will plan on repeating as needed  Consider ILESI C7-T1 for L cervical radiculopathy C5-6          Prescribed medications:  1. None    The impression and plan were discussed and explained in detail. All the questions were answered. Education was provided accordingly.           Follow-up:  Pt to schedule     Chloe Huerta MD

## 2025-06-13 DIAGNOSIS — G43.019 INTRACTABLE MIGRAINE WITHOUT AURA AND WITHOUT STATUS MIGRAINOSUS: Primary | ICD-10-CM

## 2025-06-13 NOTE — TELEPHONE ENCOUNTER
No care due was identified.  [facility] Embedded Care Due Messages. Reference number: [id number].   6/13/2025 4:49:19 PM CDT

## 2025-06-16 RX ORDER — CLONAZEPAM 0.5 MG/1
0.5 TABLET ORAL DAILY
Qty: 30 TABLET | Refills: 0 | Status: SHIPPED | OUTPATIENT
Start: 2025-06-16

## 2025-07-15 ENCOUNTER — OFFICE VISIT (OUTPATIENT)
Dept: PAIN MEDICINE | Facility: CLINIC | Age: 45
End: 2025-07-15
Payer: COMMERCIAL

## 2025-07-15 VITALS — WEIGHT: 162.56 LBS | HEIGHT: 68 IN | BODY MASS INDEX: 24.64 KG/M2

## 2025-07-15 DIAGNOSIS — G89.29 CHRONIC RIGHT SHOULDER PAIN: Primary | ICD-10-CM

## 2025-07-15 DIAGNOSIS — M25.511 CHRONIC RIGHT SHOULDER PAIN: Primary | ICD-10-CM

## 2025-07-15 PROCEDURE — 20610 DRAIN/INJ JOINT/BURSA W/O US: CPT | Mod: RT,S$GLB,, | Performed by: STUDENT IN AN ORGANIZED HEALTH CARE EDUCATION/TRAINING PROGRAM

## 2025-07-15 PROCEDURE — 1159F MED LIST DOCD IN RCRD: CPT | Mod: CPTII,S$GLB,, | Performed by: STUDENT IN AN ORGANIZED HEALTH CARE EDUCATION/TRAINING PROGRAM

## 2025-07-15 PROCEDURE — 99999 PR PBB SHADOW E&M-EST. PATIENT-LVL IV: CPT | Mod: PBBFAC,,, | Performed by: STUDENT IN AN ORGANIZED HEALTH CARE EDUCATION/TRAINING PROGRAM

## 2025-07-15 PROCEDURE — 3008F BODY MASS INDEX DOCD: CPT | Mod: CPTII,S$GLB,, | Performed by: STUDENT IN AN ORGANIZED HEALTH CARE EDUCATION/TRAINING PROGRAM

## 2025-07-15 PROCEDURE — 1160F RVW MEDS BY RX/DR IN RCRD: CPT | Mod: CPTII,S$GLB,, | Performed by: STUDENT IN AN ORGANIZED HEALTH CARE EDUCATION/TRAINING PROGRAM

## 2025-07-15 PROCEDURE — 99213 OFFICE O/P EST LOW 20 MIN: CPT | Mod: 25,S$GLB,, | Performed by: STUDENT IN AN ORGANIZED HEALTH CARE EDUCATION/TRAINING PROGRAM

## 2025-07-15 RX ORDER — TRIAMCINOLONE ACETONIDE 40 MG/ML
40 INJECTION, SUSPENSION INTRA-ARTICULAR; INTRAMUSCULAR
Status: DISCONTINUED | OUTPATIENT
Start: 2025-07-15 | End: 2025-07-15 | Stop reason: HOSPADM

## 2025-07-15 RX ORDER — BUPIVACAINE HYDROCHLORIDE 2.5 MG/ML
4 INJECTION, SOLUTION INFILTRATION; PERINEURAL
Status: DISCONTINUED | OUTPATIENT
Start: 2025-07-15 | End: 2025-07-15 | Stop reason: HOSPADM

## 2025-07-15 RX ADMIN — TRIAMCINOLONE ACETONIDE 40 MG: 40 INJECTION, SUSPENSION INTRA-ARTICULAR; INTRAMUSCULAR at 02:07

## 2025-07-15 RX ADMIN — BUPIVACAINE HYDROCHLORIDE 4 ML: 2.5 INJECTION, SOLUTION INFILTRATION; PERINEURAL at 02:07

## 2025-07-16 NOTE — PROCEDURES
Large Joint Aspiration/Injection: R subacromial bursa    Date/Time: 7/15/2025 2:40 PM    Performed by: Chloe Huerta MD  Authorized by: Chloe Huerta MD    Consent Done?:  Yes (Verbal)  Indications:  Pain  Local anesthesia used?: No      Details:  Needle Size:  21 G  Approach:  Posterior  Location:  Shoulder  Site:  R subacromial bursa  Medications:  4 mL BUPivacaine 0.25% (2.5 mg/ml) 0.25 % (2.5 mg/mL); 40 mg triamcinolone acetonide 40 mg/mL  Patient tolerance:  Patient tolerated the procedure well with no immediate complications

## 2025-07-16 NOTE — PROGRESS NOTES
[place] - Department    Berenice Garrido MD      First Office Visit: 8/28/24  Today' Date: 7/15/2025  Last Office Visit: 6/10/25    Chief complaint: neck pain    HPI: Pt is a pleasant 45 y.o., who presents for evaluation. Referred by self. Pt seen previously for neck pain and bilateral shoulder pain since 2010. Pt seen today for botox to anterior and middle scalene muscles. Pt was diagnosed venous TOS after having CTA showing dynamic compression of bilat subclavian veins at the thoracic inlet.     Pt seen today for R shoulder pain for yrs. Pt has been diagnosed with R shoulder partial thickness tear of the infraspinatus tendon. Has had R shoulder steroid injections in the past that have helped significantly with the pain. States she was unable to get an appt w/ orthopedic surgery until Oct and is here for a R shoulder injection.     With regards to neck pain going down to bilateral shoulders bilaterally but worse on the L side, pt has done well since the L anterior and middle scalene botox injections. Has started to have a return of the L sided pain. Previously endorsed having pain that starts in the neck and goes down to bilat shoulders/traps that is particularly worse with raising arms worse on the L side. Has done PT in the past and is doing HEP.         Pain disability index score: 63  Pain score: 3    Relevant Imaging/ Testing:   MR Brachial plexus 12/24 - nml  CT chest angiogram 12/24 - Dynamic compression of the distal left subclavian vein and brachial plexus in the pectoralis minor space. Dynamic compression of both proximal subclavian veins at thoracic inlet  MR R shoulder 9/24 - partial thickness tear of infraspinatus tendon  XR R shoulder 9/24  XR B shoulders 8/24  XR L-spine 10/16  XR C-spine 10/16, 8/24  MR C-spine 9/16, 11/24 - moderate L foraminal narrowing C5-6  MR L-spine 9/16    Procedures:   L anterior and middle scale botox (25u per for 50u total) 5/14/25 - >80% relief     Date of board of  pharmacy review:7/15/2025  Date of opioid risk screening/ pain psych: None  Date of opioid agreement and consent: None  Date of urine drug screen: None  Date of random pill count: None     was reviewed today: reviewed, clonazepam use    Prescribed medications: None    See EHR for  PMH, PSH, FH, SH, Medications and Allergy    ROS:  Positive for pain  ROS     PE:  There were no vitals filed for this visit.  General: Pleasant, no distress  HEENT: NC/ AT. PERRLA  CV: Radial pulses intact  Pulm: No distress  Ext: No edema    Physical Exam     Neuromusculoskeletal:  Head: NC, AT. PERRLA. No occipital tenderness  Neck: Intact range of motions, extension, flexion, rotation. Bilat Facet loading. Neg Spurling. Min Tenderness.  5/5 Strength, normal tone. Neg Brady's  Shoulder: Intact range of motion. Min Bicep groove tenderness. 5/5 Strength. Pos DAVIS's   Lumbar: Intact range of motion  Hip: Intact range of motion  SI: Level  Knee: Intact range of motion  Reflexes: normal Bicep  Strength: 5/5 globally   Sensory: decreased sensation of R arm   Skin: No bruising, erythema  Gait: Normal      Impression:  R shoulder pain   Neck pain (L>R)  Relevant History  BMI 23.81  Bipolar  Anxiety  Fibromyalgia     Assessment:  R shoulder rotator cuff tear  Venous TOS - CTA showing dynamic compression of proximal subclavian veins at thoracic inlet   Cervical radiculopathy - L foraminal narrowing C5-6   B CTS  L cubital tunnel syndrome       Plan:  Discussed options  Imaging/ relevant records viewed/ reviewed/ discussed  Imaging results viewed and reviewed (noted above)/ reviewed with patient   reviewed  Cont HEP  R subacromial bursa injection today  Re-eval after  Continue to monitor progress from botox to L anterior scalene and middle scalene - will plan on repeating as needed  Consider ILESI C7-T1 for L cervical radiculopathy C5-6          Prescribed medications:  1. None    The impression and plan were discussed and explained in  detail. All the questions were answered. Education was provided accordingly.     The procedure was explained in detail, along with risks and potential side effects.          Follow-up:  1 mo or sooner if needed    Chloe Huerta MD

## 2025-07-28 ENCOUNTER — OFFICE VISIT (OUTPATIENT)
Dept: FAMILY MEDICINE | Facility: CLINIC | Age: 45
End: 2025-07-28
Payer: COMMERCIAL

## 2025-07-28 VITALS
BODY MASS INDEX: 23.99 KG/M2 | SYSTOLIC BLOOD PRESSURE: 110 MMHG | DIASTOLIC BLOOD PRESSURE: 82 MMHG | OXYGEN SATURATION: 99 % | HEIGHT: 68 IN | HEART RATE: 83 BPM | WEIGHT: 158.31 LBS

## 2025-07-28 DIAGNOSIS — D84.821 DRUG-INDUCED IMMUNODEFICIENCY: ICD-10-CM

## 2025-07-28 DIAGNOSIS — Z79.899 DRUG-INDUCED IMMUNODEFICIENCY: ICD-10-CM

## 2025-07-28 DIAGNOSIS — J01.10 ACUTE FRONTAL SINUSITIS, RECURRENCE NOT SPECIFIED: Primary | ICD-10-CM

## 2025-07-28 PROCEDURE — 1159F MED LIST DOCD IN RCRD: CPT | Mod: CPTII,S$GLB,, | Performed by: PHYSICIAN ASSISTANT

## 2025-07-28 PROCEDURE — 3074F SYST BP LT 130 MM HG: CPT | Mod: CPTII,S$GLB,, | Performed by: PHYSICIAN ASSISTANT

## 2025-07-28 PROCEDURE — 3008F BODY MASS INDEX DOCD: CPT | Mod: CPTII,S$GLB,, | Performed by: PHYSICIAN ASSISTANT

## 2025-07-28 PROCEDURE — 1160F RVW MEDS BY RX/DR IN RCRD: CPT | Mod: CPTII,S$GLB,, | Performed by: PHYSICIAN ASSISTANT

## 2025-07-28 PROCEDURE — 99999 PR PBB SHADOW E&M-EST. PATIENT-LVL IV: CPT | Mod: PBBFAC,,, | Performed by: PHYSICIAN ASSISTANT

## 2025-07-28 PROCEDURE — 99214 OFFICE O/P EST MOD 30 MIN: CPT | Mod: S$GLB,,, | Performed by: PHYSICIAN ASSISTANT

## 2025-07-28 PROCEDURE — 3079F DIAST BP 80-89 MM HG: CPT | Mod: CPTII,S$GLB,, | Performed by: PHYSICIAN ASSISTANT

## 2025-07-28 RX ORDER — AMOXICILLIN AND CLAVULANATE POTASSIUM 875; 125 MG/1; MG/1
1 TABLET, FILM COATED ORAL EVERY 12 HOURS
Qty: 20 TABLET | Refills: 0 | Status: SHIPPED | OUTPATIENT
Start: 2025-07-28 | End: 2025-08-07

## 2025-07-28 NOTE — PROGRESS NOTES
"Subjective:      Patient ID: Pura Gudino is a 45 y.o. female.    Chief Complaint: Cough, Sore Throat, Headache (X 2weeks patient has not been feeling well. Sore throat, headache, body aches, cough, and congestion. ), Nasal Congestion, and Nausea      HPI  Patient's active medical issues include MATT, Bipolar Disorder, polycystic ovarian disease, RA, IBS, GERD, and fibromyalgia.    Patient reports cough, sore throat, headache, and nausea for two weeks.  Taking aleve with some relief.  Sick contacts.    Review of Systems   Constitutional:  Positive for chills and fever (101F).   HENT:  Positive for ear pain and sore throat.    Eyes:  Negative for pain.   Respiratory:  Positive for cough (dry). Negative for shortness of breath.    Cardiovascular:  Negative for chest pain.   Gastrointestinal:  Positive for abdominal pain. Negative for constipation, diarrhea, nausea and vomiting.   Neurological:  Positive for headaches.       Objective:   /82   Pulse 83   Ht 5' 8" (1.727 m)   Wt 71.8 kg (158 lb 4.6 oz)   LMP 05/23/2018   SpO2 99%   BMI 24.07 kg/m²     Physical Exam  Vitals reviewed.   Constitutional:       Appearance: Normal appearance. She is well-developed.   HENT:      Head: Normocephalic and atraumatic.      Right Ear: Hearing, tympanic membrane, ear canal and external ear normal.      Left Ear: Hearing, tympanic membrane, ear canal and external ear normal.      Nose:      Right Sinus: Frontal sinus tenderness present. No maxillary sinus tenderness.      Left Sinus: Frontal sinus tenderness present. No maxillary sinus tenderness.      Mouth/Throat:      Lips: Pink.      Pharynx: Oropharynx is clear.   Eyes:      General: Lids are normal.      Conjunctiva/sclera: Conjunctivae normal.   Cardiovascular:      Rate and Rhythm: Normal rate and regular rhythm.      Heart sounds: Normal heart sounds. No murmur heard.     No friction rub. No gallop.   Pulmonary:      Effort: Pulmonary effort is normal. No " respiratory distress.      Breath sounds: Normal breath sounds. No wheezing, rhonchi or rales.   Musculoskeletal:         General: Normal range of motion.   Lymphadenopathy:      Cervical: No cervical adenopathy.   Skin:     General: Skin is warm and dry.      Findings: No rash.   Neurological:      General: No focal deficit present.      Mental Status: She is alert and oriented to person, place, and time.   Psychiatric:         Mood and Affect: Mood normal.         Behavior: Behavior normal. Behavior is cooperative.         Judgment: Judgment normal.       Assessment:      1. Acute frontal sinusitis, recurrence not specified    2. Drug-induced immunodeficiency       Plan:   1. Acute frontal sinusitis, recurrence not specified (Primary)  Take antibiotic with food.  Eat yogurt and/or take probiotic while on medication.  - amoxicillin-clavulanate 875-125mg (AUGMENTIN) 875-125 mg per tablet; Take 1 tablet by mouth every 12 (twelve) hours. for 10 days  Dispense: 20 tablet; Refill: 0    2. Drug-induced immunodeficiency  Continued.    Follow up as scheduled.  Patient agreed with plan and expressed understanding.    Thank you for allowing me to serve you,

## 2025-08-05 RX ORDER — TOPIRAMATE 100 MG/1
TABLET, FILM COATED ORAL
Qty: 30 TABLET | Refills: 1 | Status: SHIPPED | OUTPATIENT
Start: 2025-08-05

## 2025-08-05 NOTE — TELEPHONE ENCOUNTER
Refill Routing Note   Medication(s) are not appropriate for processing by [facility] for the following reason(s):        Outside of protocol    [facility] action(s):  Route             Appointments  past 12m or future 3m with PCP    Date Provider   Last Visit   5/27/2025 Breenice Garrido MD   Next Visit   9/2/2025 Berenice Garrido MD   ED visits in past 90 days: 0        Note composed:9:08 AM 08/05/2025

## 2025-08-18 ENCOUNTER — OFFICE VISIT (OUTPATIENT)
Dept: FAMILY MEDICINE | Facility: CLINIC | Age: 45
End: 2025-08-18
Payer: COMMERCIAL

## 2025-08-18 VITALS
SYSTOLIC BLOOD PRESSURE: 90 MMHG | DIASTOLIC BLOOD PRESSURE: 68 MMHG | BODY MASS INDEX: 24.25 KG/M2 | HEART RATE: 92 BPM | WEIGHT: 160 LBS | OXYGEN SATURATION: 98 % | HEIGHT: 68 IN

## 2025-08-18 DIAGNOSIS — J02.9 SORE THROAT: ICD-10-CM

## 2025-08-18 DIAGNOSIS — B37.31 VAGINAL CANDIDA: ICD-10-CM

## 2025-08-18 DIAGNOSIS — H66.92 LEFT OTITIS MEDIA, UNSPECIFIED OTITIS MEDIA TYPE: Primary | ICD-10-CM

## 2025-08-18 DIAGNOSIS — Z79.899 DRUG-INDUCED IMMUNODEFICIENCY: ICD-10-CM

## 2025-08-18 DIAGNOSIS — D84.821 DRUG-INDUCED IMMUNODEFICIENCY: ICD-10-CM

## 2025-08-18 PROCEDURE — 3074F SYST BP LT 130 MM HG: CPT | Mod: CPTII,S$GLB,, | Performed by: PHYSICIAN ASSISTANT

## 2025-08-18 PROCEDURE — 87635 SARS-COV-2 COVID-19 AMP PRB: CPT | Mod: QW,S$GLB,, | Performed by: PHYSICIAN ASSISTANT

## 2025-08-18 PROCEDURE — 1160F RVW MEDS BY RX/DR IN RCRD: CPT | Mod: CPTII,S$GLB,, | Performed by: PHYSICIAN ASSISTANT

## 2025-08-18 PROCEDURE — 99214 OFFICE O/P EST MOD 30 MIN: CPT | Mod: S$GLB,,, | Performed by: PHYSICIAN ASSISTANT

## 2025-08-18 PROCEDURE — 99999 PR PBB SHADOW E&M-EST. PATIENT-LVL IV: CPT | Mod: PBBFAC,,, | Performed by: PHYSICIAN ASSISTANT

## 2025-08-18 PROCEDURE — 1159F MED LIST DOCD IN RCRD: CPT | Mod: CPTII,S$GLB,, | Performed by: PHYSICIAN ASSISTANT

## 2025-08-18 PROCEDURE — 3008F BODY MASS INDEX DOCD: CPT | Mod: CPTII,S$GLB,, | Performed by: PHYSICIAN ASSISTANT

## 2025-08-18 PROCEDURE — 3078F DIAST BP <80 MM HG: CPT | Mod: CPTII,S$GLB,, | Performed by: PHYSICIAN ASSISTANT

## 2025-08-18 PROCEDURE — 87502 INFLUENZA DNA AMP PROBE: CPT | Mod: QW,S$GLB,, | Performed by: PHYSICIAN ASSISTANT

## 2025-08-18 RX ORDER — DOXYCYCLINE 100 MG/1
100 CAPSULE ORAL 2 TIMES DAILY
Qty: 20 CAPSULE | Refills: 0 | Status: SHIPPED | OUTPATIENT
Start: 2025-08-18 | End: 2025-08-28

## 2025-08-18 RX ORDER — FLUCONAZOLE 150 MG/1
TABLET ORAL
Qty: 2 TABLET | Refills: 0 | Status: SHIPPED | OUTPATIENT
Start: 2025-08-18

## 2025-08-21 ENCOUNTER — OFFICE VISIT (OUTPATIENT)
Dept: PAIN MEDICINE | Facility: CLINIC | Age: 45
End: 2025-08-21
Payer: COMMERCIAL

## 2025-08-21 VITALS — BODY MASS INDEX: 24.34 KG/M2 | WEIGHT: 160.06 LBS

## 2025-08-21 DIAGNOSIS — M54.2 NECK PAIN: Primary | ICD-10-CM

## 2025-08-21 PROCEDURE — 99214 OFFICE O/P EST MOD 30 MIN: CPT | Mod: S$GLB,,, | Performed by: STUDENT IN AN ORGANIZED HEALTH CARE EDUCATION/TRAINING PROGRAM

## 2025-08-21 PROCEDURE — 1160F RVW MEDS BY RX/DR IN RCRD: CPT | Mod: CPTII,S$GLB,, | Performed by: STUDENT IN AN ORGANIZED HEALTH CARE EDUCATION/TRAINING PROGRAM

## 2025-08-21 PROCEDURE — 3008F BODY MASS INDEX DOCD: CPT | Mod: CPTII,S$GLB,, | Performed by: STUDENT IN AN ORGANIZED HEALTH CARE EDUCATION/TRAINING PROGRAM

## 2025-08-21 PROCEDURE — 1159F MED LIST DOCD IN RCRD: CPT | Mod: CPTII,S$GLB,, | Performed by: STUDENT IN AN ORGANIZED HEALTH CARE EDUCATION/TRAINING PROGRAM

## 2025-08-21 PROCEDURE — 99999 PR PBB SHADOW E&M-EST. PATIENT-LVL III: CPT | Mod: PBBFAC,,, | Performed by: STUDENT IN AN ORGANIZED HEALTH CARE EDUCATION/TRAINING PROGRAM

## 2025-09-02 ENCOUNTER — OFFICE VISIT (OUTPATIENT)
Dept: FAMILY MEDICINE | Facility: CLINIC | Age: 45
End: 2025-09-02
Payer: COMMERCIAL

## 2025-09-02 VITALS
HEART RATE: 98 BPM | DIASTOLIC BLOOD PRESSURE: 76 MMHG | RESPIRATION RATE: 20 BRPM | OXYGEN SATURATION: 96 % | SYSTOLIC BLOOD PRESSURE: 112 MMHG | BODY MASS INDEX: 24.34 KG/M2 | HEIGHT: 68 IN

## 2025-09-02 DIAGNOSIS — Z00.00 ANNUAL PHYSICAL EXAM: Primary | ICD-10-CM

## 2025-09-02 DIAGNOSIS — G44.209 TENSION-TYPE HEADACHE, NOT INTRACTABLE, UNSPECIFIED CHRONICITY PATTERN: ICD-10-CM

## 2025-09-02 PROCEDURE — 99999 PR PBB SHADOW E&M-EST. PATIENT-LVL V: CPT | Mod: PBBFAC,,, | Performed by: INTERNAL MEDICINE

## 2025-09-02 RX ORDER — PREDNISONE 20 MG/1
20 TABLET ORAL DAILY
Qty: 12 TABLET | Refills: 0 | Status: SHIPPED | OUTPATIENT
Start: 2025-09-02 | End: 2025-09-02

## 2025-09-02 RX ORDER — PREDNISONE 20 MG/1
20 TABLET ORAL DAILY
Qty: 10 TABLET | Refills: 0 | Status: SHIPPED | OUTPATIENT
Start: 2025-09-02